# Patient Record
Sex: FEMALE | Race: WHITE | NOT HISPANIC OR LATINO | Employment: FULL TIME | URBAN - METROPOLITAN AREA
[De-identification: names, ages, dates, MRNs, and addresses within clinical notes are randomized per-mention and may not be internally consistent; named-entity substitution may affect disease eponyms.]

---

## 2017-01-01 ENCOUNTER — MYC MEDICAL ADVICE (OUTPATIENT)
Dept: FAMILY MEDICINE | Facility: CLINIC | Age: 47
End: 2017-01-01

## 2017-01-01 DIAGNOSIS — F41.9 ANXIETY: Primary | ICD-10-CM

## 2017-01-02 RX ORDER — LORAZEPAM 0.5 MG/1
0.5 TABLET ORAL EVERY 8 HOURS PRN
Qty: 10 TABLET | Refills: 0 | Status: CANCELLED | OUTPATIENT
Start: 2017-01-02

## 2017-01-04 ENCOUNTER — OFFICE VISIT (OUTPATIENT)
Dept: FAMILY MEDICINE | Facility: CLINIC | Age: 47
End: 2017-01-04
Payer: COMMERCIAL

## 2017-01-04 VITALS
HEART RATE: 80 BPM | BODY MASS INDEX: 31.23 KG/M2 | OXYGEN SATURATION: 100 % | RESPIRATION RATE: 12 BRPM | WEIGHT: 199 LBS | SYSTOLIC BLOOD PRESSURE: 110 MMHG | TEMPERATURE: 97.9 F | DIASTOLIC BLOOD PRESSURE: 74 MMHG | HEIGHT: 67 IN

## 2017-01-04 DIAGNOSIS — I10 HYPERTENSION GOAL BP (BLOOD PRESSURE) < 140/90: ICD-10-CM

## 2017-01-04 DIAGNOSIS — F41.9 ANXIETY: Primary | ICD-10-CM

## 2017-01-04 PROCEDURE — 99213 OFFICE O/P EST LOW 20 MIN: CPT | Performed by: NURSE PRACTITIONER

## 2017-01-04 RX ORDER — FLUOXETINE 10 MG/1
CAPSULE ORAL
Qty: 60 CAPSULE | Refills: 1 | Status: SHIPPED | OUTPATIENT
Start: 2017-01-04 | End: 2017-02-03

## 2017-01-04 RX ORDER — LORAZEPAM 0.5 MG/1
0.5 TABLET ORAL EVERY 8 HOURS PRN
Qty: 10 TABLET | Refills: 0 | Status: SHIPPED | OUTPATIENT
Start: 2017-01-04 | End: 2017-02-03

## 2017-01-04 ASSESSMENT — ANXIETY QUESTIONNAIRES
3. WORRYING TOO MUCH ABOUT DIFFERENT THINGS: NEARLY EVERY DAY
7. FEELING AFRAID AS IF SOMETHING AWFUL MIGHT HAPPEN: MORE THAN HALF THE DAYS
5. BEING SO RESTLESS THAT IT IS HARD TO SIT STILL: SEVERAL DAYS
1. FEELING NERVOUS, ANXIOUS, OR ON EDGE: NEARLY EVERY DAY
IF YOU CHECKED OFF ANY PROBLEMS ON THIS QUESTIONNAIRE, HOW DIFFICULT HAVE THESE PROBLEMS MADE IT FOR YOU TO DO YOUR WORK, TAKE CARE OF THINGS AT HOME, OR GET ALONG WITH OTHER PEOPLE: EXTREMELY DIFFICULT
2. NOT BEING ABLE TO STOP OR CONTROL WORRYING: NEARLY EVERY DAY
6. BECOMING EASILY ANNOYED OR IRRITABLE: MORE THAN HALF THE DAYS
GAD7 TOTAL SCORE: 16

## 2017-01-04 ASSESSMENT — PATIENT HEALTH QUESTIONNAIRE - PHQ9: 5. POOR APPETITE OR OVEREATING: MORE THAN HALF THE DAYS

## 2017-01-04 NOTE — NURSING NOTE
"Chief Complaint   Patient presents with     Anxiety     Blood Draw     hormone check       Initial /74 mmHg  Pulse 80  Temp(Src) 97.9  F (36.6  C) (Oral)  Resp 12  Ht 5' 7\" (1.702 m)  Wt 199 lb (90.266 kg)  BMI 31.16 kg/m2  SpO2 100% Estimated body mass index is 31.16 kg/(m^2) as calculated from the following:    Height as of this encounter: 5' 7\" (1.702 m).    Weight as of this encounter: 199 lb (90.266 kg).  BP completed using cuff size: kim Boykin CMA      "

## 2017-01-04 NOTE — MR AVS SNAPSHOT
"              After Visit Summary   1/4/2017    Lawanda Katz    MRN: [id number]           Patient Information     Date Of Birth          1970        Visit Information        Provider Department      1/4/2017 8:45 AM Haase, Susan Rachele, APRN CNP [facility] [place]        Today's Diagnoses     Hypertension goal BP (blood pressure) < 140/90    -  1     Anxiety            Follow-ups after your visit        Follow-up notes from your care team     Return in about 6 months (around 7/4/2017).      Who to contact     If you have questions or need follow up information about today's clinic visit or your schedule please contact [facility] directly at [phone].  Normal or non-critical lab and imaging results will be communicated to you by [employer], letter or phone within 4 business days after the clinic has received the results. If you do not hear from us within 7 days, please contact the clinic through [employer] or phone. If you have a critical or abnormal lab result, we will notify you by phone as soon as possible.  Submit refill requests through [employer] or call your pharmacy and they will forward the refill request to us. Please allow 3 business days for your refill to be completed.          Additional Information About Your Visit        MyChart Information     [employer] gives you secure access to your electronic health record. If you see a primary care provider, you can also send messages to your care team and make appointments. If you have questions, please call your primary care clinic.  If you do not have a primary care provider, please call [phone] and they will assist you.        Care EveryWhere ID     This is your Care EveryWhere ID. This could be used by other organizations to access your [place] medical records  [id number]        Your Vitals Were     Pulse Temperature Respirations Height BMI (Body Mass Index) Pulse Oximetry    80 97.9  F (36.6  C) (Oral) 12 5' 7\" " (1.702 m) 31.16 kg/m2 100%       Blood Pressure from Last 3 Encounters:   01/04/17 110/74   11/17/16 120/80   10/05/16 142/84    Weight from Last 3 Encounters:   01/04/17 199 lb (90.266 kg)   10/05/16 195 lb (88.451 kg)   07/15/16 197 lb (89.359 kg)              Today, you had the following     No orders found for display         Today's Medication Changes          These changes are accurate as of: 1/4/17  9:31 AM.  If you have any questions, ask your nurse or doctor.               Start taking these medicines.        Dose/Directions    FLUoxetine 10 MG capsule   Commonly known as:  PROzac   Used for:  Anxiety   Started by:  Haase, Susan Rachele, APRN CNP        Take 10 mg for 7 days, after 1 week increase to 20 mg.   Quantity:  60 capsule   Refills:  1            Where to get your medicines      These medications were sent to [address] [address]     Phone:  [phone]    - FLUoxetine 10 MG capsule      Some of these will need a paper prescription and others can be bought over the counter.  Ask your nurse if you have questions.     Bring a paper prescription for each of these medications    - LORazepam 0.5 MG tablet             Primary Care Provider Office Phone # Fax #    Susan Rachele Haase, APRN [phone]       [address] [address]        Thank you!     Thank you for choosing [facility]  for your care. Our goal is always to provide you with excellent care. Hearing back from our patients is one way we can continue to improve our services. Please take a few minutes to complete the written survey that you may receive in the mail after your visit with us. Thank you!             Your Updated Medication List - Protect others around you: Learn how to safely use, store and throw away your medicines at www.disposemymeds.org.          This list is  accurate as of: 1/4/17  9:31 AM.  Always use your most recent med list.                   Brand Name Dispense Instructions for use    amLODIPine-benazepril 10-40 MG per capsule    LOTREL    90 capsule    Take 1 capsule by mouth daily       FLUoxetine 10 MG capsule    PROzac    60 capsule    Take 10 mg for 7 days, after 1 week increase to 20 mg.       LORazepam 0.5 MG tablet    ATIVAN    10 tablet    Take 1 tablet (0.5 mg) by mouth every 8 hours as needed for anxiety       tacrolimus 0.1 % ointment    PROTOPIC    60 g    Apply topically 2 times daily       valACYclovir 1000 mg tablet    VALTREX    90 tablet    Take 1 tablet (1,000 mg) by mouth daily

## 2017-01-04 NOTE — PROGRESS NOTES
SUBJECTIVE:                                                    Lawanda Katz is a 46 year old female who presents to clinic today for the following health issues:    Abnormal Mood Symptoms    Duration: 1 month    Description:  Depression: YES  Anxiety: YES  Panic attacks: YES     Accompanying signs and symptoms: see PHQ-9 and PARKER scores    History (similar episodes/previous evaluation): yes    Precipitating or alleviating factors: None    Therapies tried and outcome: Ativan (Lorezepam)   Fainancial stress due to unexpected exepnses.    Feeling really down.    Gambling:  Is under control, last gambeled 3 weeks ago, no concerns under good control.   PHQ 9 socre of 14, PARKER 7 score of 16. Denies thoughts of harming self or others.    Has been on citalopram in the past without relief of symptoms, also allergy to wellbutrin  Insomnia:  Difficulty going to sleep and staying asleep.      Problem list and histories reviewed & adjusted, as indicated.  Additional history: as documented    Patient Active Problem List   Diagnosis     Hypertension goal BP (blood pressure) < 140/90     Recurrent cold sores     S/P hysterectomy     CARDIOVASCULAR SCREENING; LDL GOAL LESS THAN 160     Anxiety     Vitamin D deficiency     Past Surgical History   Procedure Laterality Date     Hysterectomy, pap no longer indicated  2010     BSO, cervix removed, for dysmenorrhea     Adenoidectomy       Cystoscopy  2007     Tubal ligation  1999      breast biopsy lt  1/2013     left, benign       Social History   Substance Use Topics     Smoking status: Never Smoker      Smokeless tobacco: Never Used     Alcohol Use: No     Family History   Problem Relation Age of Onset     HEART DISEASE Mother      Unknown/Adopted No family hx of          Current Outpatient Prescriptions   Medication Sig Dispense Refill     tacrolimus (PROTOPIC) 0.1 % ointment Apply topically 2 times daily 60 g 0     LORazepam (ATIVAN) 0.5 MG tablet Take 1 tablet (0.5 mg) by  "mouth every 8 hours as needed for anxiety 10 tablet 0     amLODIPine-benazepril (LOTREL) 10-40 MG per capsule Take 1 capsule by mouth daily 90 capsule 3     valACYclovir (VALTREX) 1000 mg tablet Take 1 tablet (1,000 mg) by mouth daily 90 tablet 1     Allergies   Allergen Reactions     Hydrochlorothiazide Nausea and Diarrhea     Metronidazole      Wellbutrin [Bupropion]      Tingling sensation       Morphine Rash     ROS:  C: NEGATIVE for fever, chills, change in weight  R: NEGATIVE for significant cough or SOB  CV: NEGATIVE for chest pain, palpitations or peripheral edema  PSYCHIATRIC: see HPI    OBJECTIVE:                                                    /74 mmHg  Pulse 80  Temp(Src) 97.9  F (36.6  C) (Oral)  Resp 12  Ht 5' 7\" (1.702 m)  Wt 199 lb (90.266 kg)  BMI 31.16 kg/m2  SpO2 100%  Body mass index is 31.16 kg/(m^2).   GENERAL: healthy, alert and no distress  RESP: lungs clear to auscultation - no rales, rhonchi or wheezes  CV: regular rate and rhythm, normal S1 S2, no S3 or S4, no murmur, click or rub, no peripheral edema and peripheral pulses strong  PSYCH: mentation appears normal, tearful when discussing depression       ASSESSMENT:                                                    See below     PLAN:                                                    Lawanda was seen today for anxiety and blood draw.    Diagnoses and all orders for this visit:    Anxiety  -     FLUoxetine (PROZAC) 10 MG capsule; Take 10 mg for 7 days, after 1 week increase to 20 mg.  -     LORazepam (ATIVAN) 0.5 MG tablet; Take 1 tablet (0.5 mg) by mouth every 8 hours as needed for anxiety  Discussed risks and benefits of medication, need to take on a daily basis, will take at least 2-4 weeks to notice decrease in depression or anxiety, if symptoms of depression worsen stop taking medication and notify the clinic.  Is aware of emergency resources.  Encouraged increased in exercise and counseling.     Hypertension goal BP " (blood pressure) < 140/90:  Well controlled /74,         FUTURE APPOINTMENTS:       - Follow-up visit in 4-6 weeks    Susan Haase, APRN [facility]

## 2017-01-05 ASSESSMENT — ANXIETY QUESTIONNAIRES: GAD7 TOTAL SCORE: 16

## 2017-01-05 ASSESSMENT — PATIENT HEALTH QUESTIONNAIRE - PHQ9: SUM OF ALL RESPONSES TO PHQ QUESTIONS 1-9: 14

## 2017-01-19 ENCOUNTER — MYC MEDICAL ADVICE (OUTPATIENT)
Dept: FAMILY MEDICINE | Facility: CLINIC | Age: 47
End: 2017-01-19

## 2017-01-22 ENCOUNTER — OFFICE VISIT (OUTPATIENT)
Dept: URGENT CARE | Facility: URGENT CARE | Age: 47
End: 2017-01-22
Payer: COMMERCIAL

## 2017-01-22 ENCOUNTER — RADIANT APPOINTMENT (OUTPATIENT)
Dept: GENERAL RADIOLOGY | Facility: CLINIC | Age: 47
End: 2017-01-22
Attending: FAMILY MEDICINE
Payer: COMMERCIAL

## 2017-01-22 VITALS
WEIGHT: 199 LBS | TEMPERATURE: 98.2 F | HEIGHT: 67 IN | HEART RATE: 66 BPM | BODY MASS INDEX: 31.23 KG/M2 | SYSTOLIC BLOOD PRESSURE: 118 MMHG | OXYGEN SATURATION: 99 % | DIASTOLIC BLOOD PRESSURE: 84 MMHG | RESPIRATION RATE: 16 BRPM

## 2017-01-22 DIAGNOSIS — R10.9 STOMACH PAIN: ICD-10-CM

## 2017-01-22 DIAGNOSIS — R10.9 STOMACH PAIN: Primary | ICD-10-CM

## 2017-01-22 LAB
ALBUMIN UR-MCNC: NEGATIVE MG/DL
APPEARANCE UR: CLEAR
BASOPHILS # BLD AUTO: 0 10E9/L (ref 0–0.2)
BASOPHILS NFR BLD AUTO: 0.3 %
BILIRUB UR QL STRIP: NEGATIVE
COLOR UR AUTO: YELLOW
DIFFERENTIAL METHOD BLD: ABNORMAL
EOSINOPHIL # BLD AUTO: 0.1 10E9/L (ref 0–0.7)
EOSINOPHIL NFR BLD AUTO: 1.2 %
ERYTHROCYTE [DISTWIDTH] IN BLOOD BY AUTOMATED COUNT: 13.4 % (ref 10–15)
GLUCOSE UR STRIP-MCNC: NEGATIVE MG/DL
HCT VFR BLD AUTO: 40.4 % (ref 35–47)
HGB BLD-MCNC: 14.1 G/DL (ref 11.7–15.7)
HGB UR QL STRIP: NEGATIVE
KETONES UR STRIP-MCNC: NEGATIVE MG/DL
LEUKOCYTE ESTERASE UR QL STRIP: NEGATIVE
LYMPHOCYTES # BLD AUTO: 2.1 10E9/L (ref 0.8–5.3)
LYMPHOCYTES NFR BLD AUTO: 35.3 %
MCH RBC QN AUTO: 26.9 PG (ref 26.5–33)
MCHC RBC AUTO-ENTMCNC: 34.9 G/DL (ref 31.5–36.5)
MCV RBC AUTO: 77 FL (ref 78–100)
MONOCYTES # BLD AUTO: 0.5 10E9/L (ref 0–1.3)
MONOCYTES NFR BLD AUTO: 7.6 %
NEUTROPHILS # BLD AUTO: 3.4 10E9/L (ref 1.6–8.3)
NEUTROPHILS NFR BLD AUTO: 55.6 %
NITRATE UR QL: NEGATIVE
PH UR STRIP: 5.5 PH (ref 5–7)
PLATELET # BLD AUTO: 216 10E9/L (ref 150–450)
RBC # BLD AUTO: 5.25 10E12/L (ref 3.8–5.2)
SP GR UR STRIP: >1.03 (ref 1–1.03)
URN SPEC COLLECT METH UR: NORMAL
UROBILINOGEN UR STRIP-ACNC: 0.2 EU/DL (ref 0.2–1)
WBC # BLD AUTO: 6 10E9/L (ref 4–11)

## 2017-01-22 PROCEDURE — 99214 OFFICE O/P EST MOD 30 MIN: CPT | Performed by: FAMILY MEDICINE

## 2017-01-22 PROCEDURE — 85025 COMPLETE CBC W/AUTO DIFF WBC: CPT | Performed by: FAMILY MEDICINE

## 2017-01-22 PROCEDURE — 74020 XR ABDOMEN 2 VW: CPT

## 2017-01-22 PROCEDURE — 36415 COLL VENOUS BLD VENIPUNCTURE: CPT | Performed by: FAMILY MEDICINE

## 2017-01-22 PROCEDURE — 81003 URINALYSIS AUTO W/O SCOPE: CPT | Performed by: FAMILY MEDICINE

## 2017-01-22 NOTE — PROGRESS NOTES
"SUBJECTIVE:  Lawanda Katz, a 47 year old female scheduled an appointment to discuss the following issues:  Stomach pain; bores in stomach. Food unsure . , some nausea. Diarrhea neg.         Medical, social, surgical, and family histories reviewed.    ROS:  C: NEGATIVE for fever, chills  E: NEGATIVE for vision changes   R: NEGATIVE for significant cough or SOB  CV: NEGATIVE for chest pain, palpitations   GI: off-and-on abdominal pain for several weeks probably worsen last 2 weeks.Not associated with nausea vomiting or diarrhea no blood in her stool.. Feels like a band across her stomach at times. Food does not seem to affect it. She is concerned about gallbladder disease  : NEGATIVE for frequency, dysuria, or hematuria  M: NEGATIVE for significant arthralgias or myalgia  N: NEGATIVE for weakness, dizziness or paresthesias or headache    OBJECTIVE:  /84 mmHg  Pulse 66  Temp(Src) 98.2  F (36.8  C) (Oral)  Resp 16  Ht 5' 7\" (1.702 m)  Wt 199 lb (90.266 kg)  BMI 31.16 kg/m2  SpO2 99%  EXAM:  GENERAL APPEARANCE: healthy, alert and no distress  EYES: EOMI,  PERRL  HENT: ear canals and TM's normal and nose and mouth without ulcers or lesions  RESP: lungs clear to auscultation - no rales, rhonchi or wheezes  CV: regular rates and rhythm, normal S1 S2, no S3 or S4 and no murmur, click or rub -  ABDOMEN: soft and tender in the epigastrium. This is an otherwise negative exam    ASSESSMENT/PLAN:  (R10.9) Stomach pain  (primary encounter diagnosis)  Comment:   Plan:     abdominal pain over the last several weeks epigastric in nature. ddifferential diagnoses does include gallbladder disease pancreatitis epigastric pain that might suggest ulcer. i doubt if there is any lower colon problems she has none by symptoms    I'm awaiting the labs    In the interim I did have her start high-dose Pepcid as well as high-doseomeprazole.    Pending these results she may need a CT scan of her abdomen looking at the right upper " quadrant or an ultrasound

## 2017-01-22 NOTE — NURSING NOTE
"Chief Complaint   Patient presents with     Abdominal Pain       Initial /84 mmHg  Pulse 66  Temp(Src) 98.2  F (36.8  C) (Oral)  Resp 16  Ht 5' 7\" (1.702 m)  Wt 199 lb (90.266 kg)  BMI 31.16 kg/m2  SpO2 99% Estimated body mass index is 31.16 kg/(m^2) as calculated from the following:    Height as of this encounter: 5' 7\" (1.702 m).    Weight as of this encounter: 199 lb (90.266 kg).  BP completed using cuff size: regular      Chana Lemus  CMA      "

## 2017-01-23 DIAGNOSIS — R10.9 STOMACH PAIN: ICD-10-CM

## 2017-01-23 LAB — LIPASE SERPL-CCNC: 191 U/L (ref 73–393)

## 2017-01-23 PROCEDURE — 80076 HEPATIC FUNCTION PANEL: CPT | Performed by: FAMILY MEDICINE

## 2017-01-23 PROCEDURE — 36415 COLL VENOUS BLD VENIPUNCTURE: CPT | Performed by: FAMILY MEDICINE

## 2017-01-23 PROCEDURE — 83690 ASSAY OF LIPASE: CPT | Performed by: FAMILY MEDICINE

## 2017-01-24 LAB
ALBUMIN SERPL-MCNC: 3.8 G/DL (ref 3.4–5)
ALP SERPL-CCNC: 74 U/L (ref 40–150)
ALT SERPL W P-5'-P-CCNC: 21 U/L (ref 0–50)
AST SERPL W P-5'-P-CCNC: 14 U/L (ref 0–45)
BILIRUB DIRECT SERPL-MCNC: 0.1 MG/DL (ref 0–0.2)
BILIRUB SERPL-MCNC: 0.4 MG/DL (ref 0.2–1.3)
PROT SERPL-MCNC: 7.6 G/DL (ref 6.8–8.8)

## 2017-02-03 ENCOUNTER — OFFICE VISIT (OUTPATIENT)
Dept: FAMILY MEDICINE | Facility: CLINIC | Age: 47
End: 2017-02-03
Payer: COMMERCIAL

## 2017-02-03 VITALS
DIASTOLIC BLOOD PRESSURE: 80 MMHG | SYSTOLIC BLOOD PRESSURE: 120 MMHG | HEART RATE: 80 BPM | TEMPERATURE: 97.8 F | WEIGHT: 198 LBS | RESPIRATION RATE: 12 BRPM | BODY MASS INDEX: 31 KG/M2

## 2017-02-03 DIAGNOSIS — F41.9 ANXIETY: Primary | ICD-10-CM

## 2017-02-03 DIAGNOSIS — Z02.9 ADMINISTRATIVE ENCOUNTER: ICD-10-CM

## 2017-02-03 PROCEDURE — 99213 OFFICE O/P EST LOW 20 MIN: CPT | Performed by: NURSE PRACTITIONER

## 2017-02-03 RX ORDER — LORAZEPAM 0.5 MG/1
0.5 TABLET ORAL EVERY 8 HOURS PRN
Qty: 10 TABLET | Refills: 0 | Status: SHIPPED | OUTPATIENT
Start: 2017-02-03 | End: 2017-04-05

## 2017-02-03 ASSESSMENT — ANXIETY QUESTIONNAIRES
IF YOU CHECKED OFF ANY PROBLEMS ON THIS QUESTIONNAIRE, HOW DIFFICULT HAVE THESE PROBLEMS MADE IT FOR YOU TO DO YOUR WORK, TAKE CARE OF THINGS AT HOME, OR GET ALONG WITH OTHER PEOPLE: SOMEWHAT DIFFICULT
GAD7 TOTAL SCORE: 16
7. FEELING AFRAID AS IF SOMETHING AWFUL MIGHT HAPPEN: NEARLY EVERY DAY
3. WORRYING TOO MUCH ABOUT DIFFERENT THINGS: NEARLY EVERY DAY
7. FEELING AFRAID AS IF SOMETHING AWFUL MIGHT HAPPEN: NEARLY EVERY DAY
6. BECOMING EASILY ANNOYED OR IRRITABLE: MORE THAN HALF THE DAYS
5. BEING SO RESTLESS THAT IT IS HARD TO SIT STILL: SEVERAL DAYS
1. FEELING NERVOUS, ANXIOUS, OR ON EDGE: MORE THAN HALF THE DAYS
2. NOT BEING ABLE TO STOP OR CONTROL WORRYING: NEARLY EVERY DAY

## 2017-02-03 ASSESSMENT — PATIENT HEALTH QUESTIONNAIRE - PHQ9: 5. POOR APPETITE OR OVEREATING: MORE THAN HALF THE DAYS

## 2017-02-03 NOTE — NURSING NOTE
"Chief Complaint   Patient presents with     Depression       Initial /80 mmHg  Pulse 80  Temp(Src) 97.8  F (36.6  C) (Oral)  Resp 12  Wt 198 lb (89.812 kg) Estimated body mass index is 31 kg/(m^2) as calculated from the following:    Height as of 1/22/17: 5' 7\" (1.702 m).    Weight as of this encounter: 198 lb (89.812 kg).  BP completed using cuff size: kim Boykin CMA      "

## 2017-02-03 NOTE — MR AVS SNAPSHOT
After Visit Summary   2/3/2017    Lawanda Katz    MRN: [id number]           Patient Information     Date Of Birth          1970        Visit Information        Provider Department      2/3/2017 3:15 PM Haase, Susan Rachele, APRN CNP [facility]        Today's Diagnoses     Anxiety    -  1        Follow-ups after your visit        Follow-up notes from your care team     Return in about 4 weeks (around 3/3/2017).      Who to contact     If you have questions or need follow up information about today's clinic visit or your schedule please contact [facility] directly at [phone].  Normal or non-critical lab and imaging results will be communicated to you by MyChart, letter or phone within 4 business days after the clinic has received the results. If you do not hear from us within 7 days, please contact the clinic through [employer] or phone. If you have a critical or abnormal lab result, we will notify you by phone as soon as possible.  Submit refill requests through [email or web address] or call your pharmacy and they will forward the refill request to us. Please allow 3 business days for your refill to be completed.          Additional Information About Your Visit        MyChart Information     [email or web address] gives you secure access to your electronic health record. If you see a primary care provider, you can also send messages to your care team and make appointments. If you have questions, please call your primary care clinic.  If you do not have a primary care provider, please call [phone] and they will assist you.        Care EveryWhere ID     This is your Care EveryWhere ID. This could be used by other organizations to access your [place] medical records  [id number]        Your Vitals Were     Pulse Temperature Respirations             80 97.8  F (36.6  C) (Oral) 12          Blood Pressure from Last 3 Encounters:   02/03/17 120/80   01/22/17 118/84   01/04/17 110/74     Weight from Last 3 Encounters:   02/03/17 198 lb (89.812 kg)   01/22/17 199 lb (90.266 kg)   01/04/17 199 lb (90.266 kg)              Today, you had the following     No orders found for display         Today's Medication Changes          These changes are accurate as of: 2/3/17  3:44 PM.  If you have any questions, ask your nurse or doctor.               These medicines have changed or have updated prescriptions.        Dose/Directions    FLUoxetine 20 MG capsule   Commonly known as:  PROzac   This may have changed:  medication strength   Used for:  Anxiety   Changed by:  Haase, Susan Rachele, APRN CNP        Take 10 mg for 7 days, after 1 week increase to 20 mg.   Quantity:  30 capsule   Refills:  11            Where to get your medicines      These medications were sent to [facility] [address] [address]     Phone:  [phone]    - FLUoxetine 20 MG capsule      Some of these will need a paper prescription and others can be bought over the counter.  Ask your nurse if you have questions.     Bring a paper prescription for each of these medications    - LORazepam 0.5 MG tablet             Primary Care Provider Office Phone # Fax #    Susan Rachele Haase, APRN [phone]       [address] [address]        Thank you!     Thank you for choosing [facility]  for your care. Our goal is always to provide you with excellent care. Hearing back from our patients is one way we can continue to improve our services. Please take a few minutes to complete the written survey that you may receive in the mail after your visit with us. Thank you!             Your Updated Medication List - Protect others around you: Learn how to safely use, store and throw away your medicines at www.disposemymeds.org.          This list is accurate as of: 2/3/17  3:44 PM.  Always use your  most recent med list.                   Brand Name Dispense Instructions for use    amLODIPine-benazepril 10-40 MG per capsule    LOTREL    90 capsule    Take 1 capsule by mouth daily       FLUoxetine 20 MG capsule    PROzac    30 capsule    Take 10 mg for 7 days, after 1 week increase to 20 mg.       LORazepam 0.5 MG tablet    ATIVAN    10 tablet    Take 1 tablet (0.5 mg) by mouth every 8 hours as needed for anxiety       tacrolimus 0.1 % ointment    PROTOPIC    60 g    Apply topically 2 times daily       valACYclovir 1000 mg tablet    VALTREX    90 tablet    Take 1 tablet (1,000 mg) by mouth daily

## 2017-02-03 NOTE — PROGRESS NOTES
SUBJECTIVE:                                                    Lawanda Katz is a 47 year old female who presents to clinic today for the following health issues:      Depression and Anxiety Follow-Up    Status since last visit: No change    Other associated symptoms:None    Complicating factors:     Significant life event: YES    Current substance abuse: None    PHQ-9 SCORE 6/2/2014 8/15/2014 1/4/2017   Total Score 1 4 -   Total Score - - 14     PARKER-7 SCORE 6/2/2014 8/15/2014 1/4/2017   Total Score 8 15 -   Total Score - - 16        PHQ-9  English      PHQ-9   Any Language     GAD7     Amount of exercise or physical activity: None    Problems taking medications regularly: No    Medication side effects: none    Diet: regular (no restrictions)  At last visit on 1/4 was started on fluoxetine, is now taking 20 mg every day, has slight decrease in depression.  PHQ 9 score of 14; PARKER 7 score of 12;  Anxiety level continues to be elevated, relates this to issues that she is having with her .  Has other stress of brother in-law living with them.    FMLA forms:  Would like these to be completed for periodic absences due to anxiety.      Problem list and histories reviewed & adjusted, as indicated.  Additional history: as documented    Patient Active Problem List   Diagnosis     Hypertension goal BP (blood pressure) < 140/90     Recurrent cold sores     S/P hysterectomy     CARDIOVASCULAR SCREENING; LDL GOAL LESS THAN 160     Anxiety     Vitamin D deficiency     Past Surgical History   Procedure Laterality Date     Hysterectomy, pap no longer indicated  2010     BSO, cervix removed, for dysmenorrhea     Adenoidectomy       Cystoscopy  2007     Tubal ligation  1999     [facility] breast biopsy lt  1/2013     left, benign       Social History   Substance Use Topics     Smoking status: Never Smoker      Smokeless tobacco: Never Used     Alcohol Use: No     Family History   Problem Relation Age of Onset     HEART DISEASE  Mother      Unknown/Adopted No family hx of          Current Outpatient Prescriptions   Medication Sig Dispense Refill     LORazepam (ATIVAN) 0.5 MG tablet Take 1 tablet (0.5 mg) by mouth every 8 hours as needed for anxiety 10 tablet 0     FLUoxetine (PROZAC) 10 MG capsule Take 10 mg for 7 days, after 1 week increase to 20 mg. 60 capsule 1     tacrolimus (PROTOPIC) 0.1 % ointment Apply topically 2 times daily 60 g 0     amLODIPine-benazepril (LOTREL) 10-40 MG per capsule Take 1 capsule by mouth daily 90 capsule 3     valACYclovir (VALTREX) 1000 mg tablet Take 1 tablet (1,000 mg) by mouth daily 90 tablet 1     Allergies   Allergen Reactions     Hydrochlorothiazide Nausea and Diarrhea     Metronidazole      Wellbutrin [Bupropion]      Tingling sensation       Morphine Rash       ROS:  C: NEGATIVE for fever, chills, change in weight  E/M: NEGATIVE for ear, mouth and throat problems  R: NEGATIVE for significant cough or SOB  CV: NEGATIVE for chest pain, palpitations or peripheral edema  PSYCHIATRIC: see HPI    OBJECTIVE:                                                    /80 mmHg  Pulse 80  Temp(Src) 97.8  F (36.6  C) (Oral)  Resp 12  Wt 198 lb (89.812 kg)  Body mass index is 31 kg/(m^2).   GENERAL: healthy, alert and no distress  PSYCH: mentation appears normal, affect tearful      ASSESSMENT:                                                    See below     PLAN:                                                    Lawanda was seen today for depression.    Diagnoses and all orders for this visit:    Anxiety:  Strongly encouraged counseling.  -       FLUoxetine (PROZAC) 20 MG capsule; Take 1 capsule (20 mg) by mouth daily    Administrative encounter: FMLA forms received.        FUTURE APPOINTMENTS:       - Follow-up visit in 4 weeks, sooner as needed.    Susan Haase, APRN [facility]

## 2017-02-03 NOTE — TELEPHONE ENCOUNTER
Ativan 0.5mg      Last Written Prescription Date:  1/4/17  Last Fill Quantity: 10,   # refills: 0  Last Office Visit with [facility] or [facility] prescribing provider: 1/4/17  Future Office visit:       Routing refill request to provider for review/approval because:  Drug not on the [facility], [facility] or [facility] refill protocol or controlled substance    Иван Pierce CPhT  [facility]    On behalf of [facility]

## 2017-02-06 ASSESSMENT — ANXIETY QUESTIONNAIRES
2. NOT BEING ABLE TO STOP OR CONTROL WORRYING: NEARLY EVERY DAY
IF YOU CHECKED OFF ANY PROBLEMS ON THIS QUESTIONNAIRE, HOW DIFFICULT HAVE THESE PROBLEMS MADE IT FOR YOU TO DO YOUR WORK, TAKE CARE OF THINGS AT HOME, OR GET ALONG WITH OTHER PEOPLE: VERY DIFFICULT
GAD7 TOTAL SCORE: 12
5. BEING SO RESTLESS THAT IT IS HARD TO SIT STILL: NOT AT ALL
3. WORRYING TOO MUCH ABOUT DIFFERENT THINGS: MORE THAN HALF THE DAYS
7. FEELING AFRAID AS IF SOMETHING AWFUL MIGHT HAPPEN: MORE THAN HALF THE DAYS
1. FEELING NERVOUS, ANXIOUS, OR ON EDGE: NEARLY EVERY DAY
6. BECOMING EASILY ANNOYED OR IRRITABLE: SEVERAL DAYS

## 2017-02-06 ASSESSMENT — PATIENT HEALTH QUESTIONNAIRE - PHQ9: 5. POOR APPETITE OR OVEREATING: SEVERAL DAYS

## 2017-02-07 ASSESSMENT — ANXIETY QUESTIONNAIRES: GAD7 TOTAL SCORE: 12

## 2017-02-07 ASSESSMENT — PATIENT HEALTH QUESTIONNAIRE - PHQ9: SUM OF ALL RESPONSES TO PHQ QUESTIONS 1-9: 13

## 2017-02-27 ENCOUNTER — OFFICE VISIT (OUTPATIENT)
Dept: FAMILY MEDICINE | Facility: CLINIC | Age: 47
End: 2017-02-27
Payer: COMMERCIAL

## 2017-02-27 VITALS
DIASTOLIC BLOOD PRESSURE: 82 MMHG | WEIGHT: 196 LBS | BODY MASS INDEX: 30.7 KG/M2 | HEART RATE: 78 BPM | TEMPERATURE: 98.7 F | RESPIRATION RATE: 14 BRPM | SYSTOLIC BLOOD PRESSURE: 124 MMHG

## 2017-02-27 DIAGNOSIS — J11.1 INFLUENZA: Primary | ICD-10-CM

## 2017-02-27 LAB
FLUAV+FLUBV AG SPEC QL: ABNORMAL
FLUAV+FLUBV AG SPEC QL: POSITIVE
SPECIMEN SOURCE: ABNORMAL

## 2017-02-27 PROCEDURE — 99213 OFFICE O/P EST LOW 20 MIN: CPT | Performed by: PHYSICIAN ASSISTANT

## 2017-02-27 PROCEDURE — 87804 INFLUENZA ASSAY W/OPTIC: CPT | Performed by: PHYSICIAN ASSISTANT

## 2017-02-27 RX ORDER — BENZONATATE 100 MG/1
100 CAPSULE ORAL 3 TIMES DAILY PRN
Qty: 42 CAPSULE | Refills: 0 | Status: SHIPPED | OUTPATIENT
Start: 2017-02-27 | End: 2017-04-05

## 2017-02-27 RX ORDER — OSELTAMIVIR PHOSPHATE 75 MG/1
75 CAPSULE ORAL 2 TIMES DAILY
Qty: 10 CAPSULE | Refills: 0 | Status: SHIPPED | OUTPATIENT
Start: 2017-02-27 | End: 2017-04-05

## 2017-02-27 RX ORDER — CODEINE PHOSPHATE AND GUAIFENESIN 10; 100 MG/5ML; MG/5ML
1 SOLUTION ORAL EVERY 4 HOURS PRN
Qty: 120 ML | Refills: 0 | Status: SHIPPED | OUTPATIENT
Start: 2017-02-27 | End: 2017-04-05

## 2017-02-27 RX ORDER — ALBUTEROL SULFATE 90 UG/1
2 AEROSOL, METERED RESPIRATORY (INHALATION) EVERY 6 HOURS PRN
Qty: 1 INHALER | Refills: 0 | Status: SHIPPED | OUTPATIENT
Start: 2017-02-27 | End: 2017-04-05

## 2017-02-27 NOTE — PROGRESS NOTES
SUBJECTIVE:                                                    Lawanda Katz is a 47 year old female who presents to clinic today for the following health issues:      Acute Illness   Acute illness concerns:   Onset: Friday    Fever: 99    Chills/Sweats: YES    Headache (location?): YES    Sinus Pressure:no    Conjunctivitis:  rash on side of eyes last pm    Ear Pain: no    Rhinorrhea: YES    Congestion: YES    Sore Throat: YES- from cough     Cough: YES-non-productive    Wheeze: no     Decreased Appetite: no     Nausea: no    Vomiting: no    Diarrhea:  no    Dysuria/Freq.: no    Fatigue/Achiness: YES    Sick/Strep Exposure: no     Therapies Tried and outcome: theraflu cold and sinus, tylenol      Problem list and histories reviewed & adjusted, as indicated.  Additional history: as documented    Problem list, Medication list, Allergies, and Medical/Social/Surgical histories reviewed in EPIC and updated as appropriate.    ROS:  Constitutional, HEENT, cardiovascular, pulmonary, gi and gu systems are negative, except as otherwise noted.    OBJECTIVE:                                                    /82 (BP Location: Right arm, Patient Position: Chair, Cuff Size: Adult Large)  Pulse 78  Temp 98.7  F (37.1  C) (Oral)  Resp 14  Wt 196 lb (88.9 kg)  BMI 30.7 kg/m2  Body mass index is 30.7 kg/(m^2).  GENERAL: alert, mild distress, fatigued and ill  EYES: Eyes grossly normal to inspection, PERRL and conjunctivae and sclerae normal  HENT: normal cephalic/atraumatic, both ears: clear effusion, nasal mucosa edematous , oropharynx clear, oral mucous membranes moist and sinuses: not tender  NECK: no adenopathy, no asymmetry, masses, or scars and thyroid normal to palpation  RESP: lungs clear to auscultation - no rales, rhonchi or wheezes  CV: regular rates and rhythm, normal S1 S2, no S3 or S4 and no murmur, click or rub  PSYCH: mentation appears normal, affect normal/bright    Diagnostic Test Results:  Results for  orders placed or performed in visit on 02/27/17 (from the past 24 hour(s))   Influenza A/B antigen   Result Value Ref Range    Influenza A/B Agn Specimen Nasal     Influenza A Positive (A) NEG    Influenza B  NEG     Negative   Test results must be correlated with clinical data. If necessary, results   should be confirmed by a molecular assay or viral culture.          ASSESSMENT/PLAN:                                                      (J11.1) Influenza  (primary encounter diagnosis)  Comment: evident for A. Given shorter course, tamiflu may still aid in recovery, but given lack of risk factors for complications, may not be totally necessary. Patient to check on cost. However, further support of cough suppression and inhaler will be given with continued otc supportive care measures. If symptoms fail to improve in 1 week, patient should RTC. Sooner if worsening.   Plan: Influenza A/B antigen, oseltamivir (TAMIFLU) 75        MG capsule, guaiFENesin-codeine (ROBITUSSIN AC)        100-10 MG/5ML SOLN solution, benzonatate         (TESSALON) 100 MG capsule, albuterol (PROAIR         HFA/PROVENTIL HFA/VENTOLIN HFA) 108 (90 BASE)         MCG/ACT Inhaler        -Medication use and side effects discussed with the patient. Patient is in complete understanding and agreement with plan.         Follow up: as above     Juan Diego Coronado PA-C  [facility]

## 2017-02-27 NOTE — LETTER
[facility]  [address] [place] [id number]  Phone: [phone]    February 27, 2017        Lawanda Katz  [address]  [place] [address]          To whom it may concern:    RE: Lawanda Katz    Patient was seen and treated today at our clinic and missed work due to illness. Recommending rest and recovery as well as avoidance of work space due to being contagious until symptoms improve. May take as long as 1 additional week.     Please contact me for questions or concerns.      Sincerely,        Juan Diego Coronado PA-C

## 2017-02-27 NOTE — MR AVS SNAPSHOT
After Visit Summary   2/27/2017    Lawanda Katz    MRN: [id number]           Patient Information     Date Of Birth          1970        Visit Information        Provider Department      2/27/2017 1:15 PM Juan Diego Coronado PA-C [facility]        Today's Diagnoses     Influenza    -  1      Care Instructions      Influenza  Influenza ( the flu ) is an infection that affects your respiratory tract (the mouth, nose, and lungs, and the passages between them). Unlike a cold, the flu can make you very ill. And it can lead to pneumonia, a serious lung infection. For some people, especially older adults, young children, and people with certain chronic conditions, the flu can have serious complications and even be fatal.  What Are the Risk Factors for the Flu?     Viruses that cause influenza spread through the air in droplets when someone who has the flu coughs, sneezes, laughs, or talks.   Anyone can get the flu. But you re more likely to become infected if you:    Have a weakened immune system.    Work in a health care setting where you may be exposed to flu germs.    Live or work with someone who has the flu.    Haven t received an annual flu shot.  How Does the Flu Spread?  The flu is caused by viruses. The viruses spread through the air in droplets when someone who has the flu coughs, sneezes, laughs, or talks. You can become infected when you inhale these viruses directly. You can also become infected when you touch a surface on which the droplets have landed and then transfer the germs to your eyes, nose, or mouth. Touching used tissues, or sharing utensils, drinking glasses, or a toothbrush with an infected person can expose you to flu viruses, too.  What Are the Symptoms of the Flu?  Flu symptoms tend to come on quickly and may last a few days to a few weeks. They include:    Fever usually higher than 101 F  (38.3 C) and chills    Sore throat and headache    Dry  cough    Runny nose    Tiredness and weakness    Muscle aches  Factors That Can Make Flu Worse  For some people, the flu can be very serious. The risk of complications is greater for:    Children under age 5.    Adults 65 years of age and older.    People with a chronic illness, such as diabetes or heart, kidney, or lung disease.    People who live in a nursing home or long-term care facility.   How Is the Flu Treated?  Influenza usually improves after 7 days or so. In some cases, your health care provider may prescribe an antiviral medication. This may help you get well sooner. For the medication to help, you need to take it as soon as possible (ideally within 48 hours) after your symptoms start. If you develop pneumonia or other serious illness, hospital care may be needed.  Easing Flu Symptoms    Drink lots of fluids such as water, juice, and warm soup. A good rule is to drink enough so that you urinate your normal amount.    Get plenty of rest.    Ask your health care provider what to take for fever and pain.    Call your provider if your fever rises over 101 F (38.3 C) or you become dizzy, lightheaded, or short of breath.  Taking Steps to Protect Others    Wash your hands often, especially after coughing or sneezing. Or, clean your hands with an alcohol-based hand  containing at least 60 percent alcohol.    Cough or sneeze into a tissue. Then throw the tissue away and wash your hands. If you don t have a tissue, cough and sneeze into the crook of your elbow.    Stay home until at least 24 hours after you no longer have a fever or chills. Be sure the fever isn t being hidden by fever-reducing medication.    Don t share food, utensils, drinking glasses, or a toothbrush with others.    Ask your health care provider if others in your household should receive antiviral medication to help them avoid infection.  How Can the Flu Be Prevented?    One of the best ways to avoid the flu is to get a flu vaccination  each year. Viruses that cause the flu change from year to year. For that reason, doctors recommend getting the flu vaccine each year, as soon as it's available in your area. The vaccine may be given as a shot or as a nasal spray. Your health care provider can tell you which vaccine is right for you.    Wash your hands often. Frequent handwashing is a proven way to help prevent infection.    Carry an alcohol-based hand gel containing at least 60 percent alcohol. Use it when you don t have access to soap and water. Then wash your hands as soon as you can.    Avoid touching your eyes, nose, and mouth.    At home and work, clean phones, computer keyboards, and toys often with disinfectant wipes.    If possible, avoid close contact with others who have the flu or symptoms of the flu.  Handwashing Tips  Handwashing is one of the best ways to prevent many common infections. If you re caring for or visiting someone with the flu, wash your hands each time you enter and leave the room. Follow these steps:    Use warm water and plenty of soap. Rub your hands together well.    Clean the whole hand, under your nails, between your fingers, and up the wrists.    Wash for at least 15 seconds.    Rinse, letting the water run down your fingers, not up your wrists.    Dry your hands well. Use a paper towel to turn off the faucet and open the door.  Using Alcohol-Based Hand   Alcohol-based hand  are also a good choice. Use them when you don t have access to soap and water. Follow these steps:    Squeeze about a tablespoon of gel into the palm of one hand.    Rub your hands together briskly, cleaning the backs of your hands, the palms, between your fingers, and up the wrists.    Rub until the gel is gone and your hands are completely dry.  Preventing Influenza in Healthcare Settings  The flu is a special concern for people in hospitals and long-term care facilities. To help prevent the spread of flu, many hospitals and  nursing homes take these steps:    Health care providers wash their hands or use an alcohol-based hand  before and after treating each patient.    People with the flu have private rooms and bathrooms or share a room with someone with the same infection.    High-risk patients who don t have the flu are encouraged to get the flu and pneumonia vaccines.    All health care workers are encouraged or required to get flu shots.        3054-6665 [employer]. [address] [place] [address]. All rights reserved. This information is not intended as a substitute for professional medical care. Always follow your healthcare professional's instructions.              Follow-ups after your visit        Your next 10 appointments already scheduled     Feb 27, 2017  1:15 PM CST   SHORT with Juan Diego Coronado PA-C   [facility] ([facility])    [address] [place] [id number] [phone]              Who to contact     If you have questions or need follow up information about today's clinic visit or your schedule please contact [facility] directly at [phone].  Normal or non-critical lab and imaging results will be communicated to you by [employer], letter or phone within 4 business days after the clinic has received the results. If you do not hear from us within 7 days, please contact the clinic through [employer] or phone. If you have a critical or abnormal lab result, we will notify you by phone as soon as possible.  Submit refill requests through [employer] or call your pharmacy and they will forward the refill request to us. Please allow 3 business days for your refill to be completed.          Additional Information About Your Visit        [employer] Information     [employer] gives you secure access to your electronic health record. If you see a primary care provider, you can also send messages to your care team and make  appointments. If you have questions, please call your primary care clinic.  If you do not have a primary care provider, please call [phone] and they will assist you.        Care EveryWhere ID     This is your Care EveryWhere ID. This could be used by other organizations to access your [place] medical records  [id number]        Your Vitals Were     Pulse Temperature Respirations BMI (Body Mass Index)          78 98.7  F (37.1  C) (Oral) 14 30.7 kg/m2         Blood Pressure from Last 3 Encounters:   02/27/17 124/82   02/03/17 120/80   01/22/17 118/84    Weight from Last 3 Encounters:   02/27/17 196 lb (88.9 kg)   02/03/17 198 lb (89.8 kg)   01/22/17 199 lb (90.3 kg)              We Performed the Following     Influenza A/B antigen          Today's Medication Changes          These changes are accurate as of: 2/27/17  9:12 AM.  If you have any questions, ask your nurse or doctor.               Start taking these medicines.        Dose/Directions    albuterol 108 (90 BASE) MCG/ACT Inhaler   Commonly known as:  PROAIR HFA/PROVENTIL HFA/VENTOLIN HFA   Used for:  Influenza   Started by:  Juan Diego Coronado PA-C        Dose:  2 puff   Inhale 2 puffs into the lungs every 6 hours as needed for shortness of breath / dyspnea or wheezing   Quantity:  1 Inhaler   Refills:  0       benzonatate 100 MG capsule   Commonly known as:  TESSALON   Used for:  Influenza   Started by:  Juan Diego Coronado PA-C        Dose:  100 mg   Take 1 capsule (100 mg) by mouth 3 times daily as needed   Quantity:  42 capsule   Refills:  0       guaiFENesin-codeine 100-10 MG/5ML Soln solution   Commonly known as:  ROBITUSSIN AC   Used for:  Influenza   Started by:  Juan Diego Coronado PA-C        Dose:  1 tsp.   Take 5 mLs by mouth every 4 hours as needed for cough   Quantity:  120 mL   Refills:  0       oseltamivir 75 MG capsule   Commonly known as:  TAMIFLU   Used for:  Influenza   Started by:  Juan Diego Coronado PA-C        Dose:   75 mg   Take 1 capsule (75 mg) by mouth 2 times daily   Quantity:  10 capsule   Refills:  0            Where to get your medicines      These medications were sent to [address] [address]     Phone:  [phone]     albuterol 108 (90 BASE) MCG/ACT Inhaler    benzonatate 100 MG capsule    oseltamivir 75 MG capsule         Some of these will need a paper prescription and others can be bought over the counter.  Ask your nurse if you have questions.     Bring a paper prescription for each of these medications     guaiFENesin-codeine 100-10 MG/5ML Soln solution                Primary Care Provider Office Phone # Fax #    Susan Rachele Haase, APRN [phone]       [facility] [address] [place] [address]        Thank you!     Thank you for choosing [facility]  for your care. Our goal is always to provide you with excellent care. Hearing back from our patients is one way we can continue to improve our services. Please take a few minutes to complete the written survey that you may receive in the mail after your visit with us. Thank you!             Your Updated Medication List - Protect others around you: Learn how to safely use, store and throw away your medicines at www.disposemymeds.org.          This list is accurate as of: 2/27/17  9:12 AM.  Always use your most recent med list.                   Brand Name Dispense Instructions for use    albuterol 108 (90 BASE) MCG/ACT Inhaler    PROAIR HFA/PROVENTIL HFA/VENTOLIN HFA    1 Inhaler    Inhale 2 puffs into the lungs every 6 hours as needed for shortness of breath / dyspnea or wheezing       amLODIPine-benazepril 10-40 MG per capsule    LOTREL    90 capsule    Take 1 capsule by mouth daily       benzonatate 100 MG capsule    TESSALON    42 capsule    Take 1 capsule (100 mg) by mouth 3 times daily as needed        FLUoxetine 20 MG capsule    PROzac    30 capsule    Take 1 capsule (20 mg) by mouth daily       guaiFENesin-codeine 100-10 MG/5ML Soln solution    ROBITUSSIN AC    120 mL    Take 5 mLs by mouth every 4 hours as needed for cough       LORazepam 0.5 MG tablet    ATIVAN    10 tablet    Take 1 tablet (0.5 mg) by mouth every 8 hours as needed for anxiety       oseltamivir 75 MG capsule    TAMIFLU    10 capsule    Take 1 capsule (75 mg) by mouth 2 times daily       tacrolimus 0.1 % ointment    PROTOPIC    60 g    Apply topically 2 times daily       valACYclovir 1000 mg tablet    VALTREX    90 tablet    Take 1 tablet (1,000 mg) by mouth daily

## 2017-02-27 NOTE — PATIENT INSTRUCTIONS
Influenza  Influenza ( the flu ) is an infection that affects your respiratory tract (the mouth, nose, and lungs, and the passages between them). Unlike a cold, the flu can make you very ill. And it can lead to pneumonia, a serious lung infection. For some people, especially older adults, young children, and people with certain chronic conditions, the flu can have serious complications and even be fatal.  What Are the Risk Factors for the Flu?     Viruses that cause influenza spread through the air in droplets when someone who has the flu coughs, sneezes, laughs, or talks.   Anyone can get the flu. But you re more likely to become infected if you:    Have a weakened immune system.    Work in a health care setting where you may be exposed to flu germs.    Live or work with someone who has the flu.    Haven t received an annual flu shot.  How Does the Flu Spread?  The flu is caused by viruses. The viruses spread through the air in droplets when someone who has the flu coughs, sneezes, laughs, or talks. You can become infected when you inhale these viruses directly. You can also become infected when you touch a surface on which the droplets have landed and then transfer the germs to your eyes, nose, or mouth. Touching used tissues, or sharing utensils, drinking glasses, or a toothbrush with an infected person can expose you to flu viruses, too.  What Are the Symptoms of the Flu?  Flu symptoms tend to come on quickly and may last a few days to a few weeks. They include:    Fever usually higher than 101 F  (38.3 C) and chills    Sore throat and headache    Dry cough    Runny nose    Tiredness and weakness    Muscle aches  Factors That Can Make Flu Worse  For some people, the flu can be very serious. The risk of complications is greater for:    Children under age 5.    Adults 65 years of age and older.    People with a chronic illness, such as diabetes or heart, kidney, or lung disease.    People who live in a nursing  home or long-term care facility.   How Is the Flu Treated?  Influenza usually improves after 7 days or so. In some cases, your health care provider may prescribe an antiviral medication. This may help you get well sooner. For the medication to help, you need to take it as soon as possible (ideally within 48 hours) after your symptoms start. If you develop pneumonia or other serious illness, hospital care may be needed.  Easing Flu Symptoms    Drink lots of fluids such as water, juice, and warm soup. A good rule is to drink enough so that you urinate your normal amount.    Get plenty of rest.    Ask your health care provider what to take for fever and pain.    Call your provider if your fever rises over 101 F (38.3 C) or you become dizzy, lightheaded, or short of breath.  Taking Steps to Protect Others    Wash your hands often, especially after coughing or sneezing. Or, clean your hands with an alcohol-based hand  containing at least 60 percent alcohol.    Cough or sneeze into a tissue. Then throw the tissue away and wash your hands. If you don t have a tissue, cough and sneeze into the crook of your elbow.    Stay home until at least 24 hours after you no longer have a fever or chills. Be sure the fever isn t being hidden by fever-reducing medication.    Don t share food, utensils, drinking glasses, or a toothbrush with others.    Ask your health care provider if others in your household should receive antiviral medication to help them avoid infection.  How Can the Flu Be Prevented?    One of the best ways to avoid the flu is to get a flu vaccination each year. Viruses that cause the flu change from year to year. For that reason, doctors recommend getting the flu vaccine each year, as soon as it's available in your area. The vaccine may be given as a shot or as a nasal spray. Your health care provider can tell you which vaccine is right for you.    Wash your hands often. Frequent handwashing is a proven way  to help prevent infection.    Carry an alcohol-based hand gel containing at least 60 percent alcohol. Use it when you don t have access to soap and water. Then wash your hands as soon as you can.    Avoid touching your eyes, nose, and mouth.    At home and work, clean phones, computer keyboards, and toys often with disinfectant wipes.    If possible, avoid close contact with others who have the flu or symptoms of the flu.  Handwashing Tips  Handwashing is one of the best ways to prevent many common infections. If you re caring for or visiting someone with the flu, wash your hands each time you enter and leave the room. Follow these steps:    Use warm water and plenty of soap. Rub your hands together well.    Clean the whole hand, under your nails, between your fingers, and up the wrists.    Wash for at least 15 seconds.    Rinse, letting the water run down your fingers, not up your wrists.    Dry your hands well. Use a paper towel to turn off the faucet and open the door.  Using Alcohol-Based Hand   Alcohol-based hand  are also a good choice. Use them when you don t have access to soap and water. Follow these steps:    Squeeze about a tablespoon of gel into the palm of one hand.    Rub your hands together briskly, cleaning the backs of your hands, the palms, between your fingers, and up the wrists.    Rub until the gel is gone and your hands are completely dry.  Preventing Influenza in Healthcare Settings  The flu is a special concern for people in hospitals and long-term care facilities. To help prevent the spread of flu, many hospitals and nursing homes take these steps:    Health care providers wash their hands or use an alcohol-based hand  before and after treating each patient.    People with the flu have private rooms and bathrooms or share a room with someone with the same infection.    High-risk patients who don t have the flu are encouraged to get the flu and pneumonia  vaccines.    All health care workers are encouraged or required to get flu shots.        4866-3021 [employer]. [address] [place] [address], [place] [address]. All rights reserved. This information is not intended as a substitute for professional medical care. Always follow your healthcare professional's instructions.

## 2017-02-27 NOTE — NURSING NOTE
"Chief Complaint   Patient presents with     Cough     Initial /82 (BP Location: Right arm, Patient Position: Chair, Cuff Size: Adult Large)  Pulse 78  Temp 98.7  F (37.1  C) (Oral)  Resp 14  Wt 196 lb (88.9 kg)  BMI 30.7 kg/m2 Estimated body mass index is 30.7 kg/(m^2) as calculated from the following:    Height as of 1/22/17: 5' 7\" (1.702 m).    Weight as of this encounter: 196 lb (88.9 kg)..  BP completed using cuff size large-RA    "

## 2017-03-08 DIAGNOSIS — J11.1 INFLUENZA-LIKE ILLNESS: Primary | ICD-10-CM

## 2017-03-08 DIAGNOSIS — J11.1 INFLUENZA-LIKE ILLNESS: ICD-10-CM

## 2017-03-08 LAB
FLUAV+FLUBV AG SPEC QL: NEGATIVE
FLUAV+FLUBV AG SPEC QL: NORMAL
SPECIMEN SOURCE: NORMAL

## 2017-03-08 PROCEDURE — 87804 INFLUENZA ASSAY W/OPTIC: CPT | Performed by: PHYSICIAN ASSISTANT

## 2017-03-29 DIAGNOSIS — R05.9 COUGH: Primary | ICD-10-CM

## 2017-03-29 LAB
FLUAV+FLUBV AG SPEC QL: NEGATIVE
FLUAV+FLUBV AG SPEC QL: NORMAL
SPECIMEN SOURCE: NORMAL

## 2017-03-29 PROCEDURE — 87804 INFLUENZA ASSAY W/OPTIC: CPT | Performed by: NURSE PRACTITIONER

## 2017-03-30 DIAGNOSIS — F41.9 ANXIETY: ICD-10-CM

## 2017-03-30 RX ORDER — LORAZEPAM 0.5 MG/1
0.5 TABLET ORAL EVERY 8 HOURS PRN
Qty: 10 TABLET | Refills: 0 | OUTPATIENT
Start: 2017-03-30

## 2017-03-30 NOTE — TELEPHONE ENCOUNTER
Routing refill request to provider for review/approval because:  Drug not on the [facility] refill protocol   Chikis Caro RN, BS  Clinical Nurse Triage.

## 2017-03-30 NOTE — TELEPHONE ENCOUNTER
Controlled Substance Refill Request for Lorazepam 0.5mg  Problem List Complete:  No     PROVIDER TO CONSIDER COMPLETION OF PROBLEM LIST AND OVERVIEW/CONTROLLED SUBSTANCE AGREEMENT    Last Written Prescription Date:  02/03/17  Last Fill Quantity: 10,   # refills: 0    Last Office Visit with [facility] primary care provider: 02/27/17    Future Office visit:     Controlled substance agreement on file: No.     Processing:  Fax Rx to [phone] pharmacy   checked in past 6 months?  No, route to RN     Thank you,  Christa Durbin  [facility]  [phone]

## 2017-04-05 ENCOUNTER — OFFICE VISIT (OUTPATIENT)
Dept: FAMILY MEDICINE | Facility: CLINIC | Age: 47
End: 2017-04-05
Payer: COMMERCIAL

## 2017-04-05 VITALS
OXYGEN SATURATION: 99 % | SYSTOLIC BLOOD PRESSURE: 130 MMHG | RESPIRATION RATE: 12 BRPM | HEART RATE: 82 BPM | TEMPERATURE: 97.9 F | DIASTOLIC BLOOD PRESSURE: 80 MMHG | WEIGHT: 196 LBS | BODY MASS INDEX: 30.7 KG/M2

## 2017-04-05 DIAGNOSIS — E55.9 VITAMIN D DEFICIENCY: ICD-10-CM

## 2017-04-05 DIAGNOSIS — F41.9 ANXIETY: ICD-10-CM

## 2017-04-05 DIAGNOSIS — I10 HYPERTENSION GOAL BP (BLOOD PRESSURE) < 140/90: Primary | ICD-10-CM

## 2017-04-05 LAB
BASOPHILS # BLD AUTO: 0 10E9/L (ref 0–0.2)
BASOPHILS NFR BLD AUTO: 0.3 %
DIFFERENTIAL METHOD BLD: NORMAL
EOSINOPHIL # BLD AUTO: 0.1 10E9/L (ref 0–0.7)
EOSINOPHIL NFR BLD AUTO: 1 %
ERYTHROCYTE [DISTWIDTH] IN BLOOD BY AUTOMATED COUNT: 13.8 % (ref 10–15)
HCT VFR BLD AUTO: 40 % (ref 35–47)
HGB BLD-MCNC: 13.8 G/DL (ref 11.7–15.7)
LYMPHOCYTES # BLD AUTO: 1.9 10E9/L (ref 0.8–5.3)
LYMPHOCYTES NFR BLD AUTO: 32.9 %
MCH RBC QN AUTO: 27.5 PG (ref 26.5–33)
MCHC RBC AUTO-ENTMCNC: 34.5 G/DL (ref 31.5–36.5)
MCV RBC AUTO: 80 FL (ref 78–100)
MONOCYTES # BLD AUTO: 0.5 10E9/L (ref 0–1.3)
MONOCYTES NFR BLD AUTO: 8 %
NEUTROPHILS # BLD AUTO: 3.4 10E9/L (ref 1.6–8.3)
NEUTROPHILS NFR BLD AUTO: 57.8 %
PLATELET # BLD AUTO: 216 10E9/L (ref 150–450)
RBC # BLD AUTO: 5.02 10E12/L (ref 3.8–5.2)
WBC # BLD AUTO: 5.9 10E9/L (ref 4–11)

## 2017-04-05 PROCEDURE — 99213 OFFICE O/P EST LOW 20 MIN: CPT | Performed by: NURSE PRACTITIONER

## 2017-04-05 PROCEDURE — 36415 COLL VENOUS BLD VENIPUNCTURE: CPT | Performed by: NURSE PRACTITIONER

## 2017-04-05 PROCEDURE — 80050 GENERAL HEALTH PANEL: CPT | Performed by: NURSE PRACTITIONER

## 2017-04-05 PROCEDURE — 82306 VITAMIN D 25 HYDROXY: CPT | Performed by: NURSE PRACTITIONER

## 2017-04-05 RX ORDER — LORAZEPAM 0.5 MG/1
0.5 TABLET ORAL EVERY 8 HOURS PRN
Qty: 10 TABLET | Refills: 0 | Status: SHIPPED | OUTPATIENT
Start: 2017-04-05 | End: 2017-06-02

## 2017-04-05 ASSESSMENT — ANXIETY QUESTIONNAIRES
3. WORRYING TOO MUCH ABOUT DIFFERENT THINGS: NEARLY EVERY DAY
5. BEING SO RESTLESS THAT IT IS HARD TO SIT STILL: NOT AT ALL
IF YOU CHECKED OFF ANY PROBLEMS ON THIS QUESTIONNAIRE, HOW DIFFICULT HAVE THESE PROBLEMS MADE IT FOR YOU TO DO YOUR WORK, TAKE CARE OF THINGS AT HOME, OR GET ALONG WITH OTHER PEOPLE: EXTREMELY DIFFICULT
1. FEELING NERVOUS, ANXIOUS, OR ON EDGE: NEARLY EVERY DAY
2. NOT BEING ABLE TO STOP OR CONTROL WORRYING: NEARLY EVERY DAY
GAD7 TOTAL SCORE: 17
7. FEELING AFRAID AS IF SOMETHING AWFUL MIGHT HAPPEN: MORE THAN HALF THE DAYS
6. BECOMING EASILY ANNOYED OR IRRITABLE: NEARLY EVERY DAY

## 2017-04-05 ASSESSMENT — PATIENT HEALTH QUESTIONNAIRE - PHQ9: 5. POOR APPETITE OR OVEREATING: NEARLY EVERY DAY

## 2017-04-05 NOTE — MR AVS SNAPSHOT
After Visit Summary   4/5/2017    Lawanda Katz    MRN: [id number]           Patient Information     Date Of Birth          1970        Visit Information        Provider Department      4/5/2017 11:45 AM Haase, Susan Rachele, APRN CNP [facility]        Today's Diagnoses     Hypertension goal BP (blood pressure) < 140/90    -  1    Anxiety        Vitamin D deficiency           Follow-ups after your visit        Follow-up notes from your care team     Return in about 3 months (around 7/5/2017).      Your next 10 appointments already scheduled     Apr 05, 2017 11:45 AM CDT   SHORT with Susan Rachele Haase[facility]    [address] [place] [id number] [phone]              Who to contact     If you have questions or need follow up information about today's clinic visit or your schedule please contact [facility] directly at [phone].  Normal or non-critical lab and imaging results will be communicated to you by CIRQYhart, letter or phone within 4 business days after the clinic has received the results. If you do not hear from us within 7 days, please contact the clinic through MarketRiderst or phone. If you have a critical or abnormal lab result, we will notify you by phone as soon as possible.  Submit refill requests through Modulus or call your pharmacy and they will forward the refill request to us. Please allow 3 business days for your refill to be completed.          Additional Information About Your Visit        MyChart Information     Modulus gives you secure access to your electronic health record. If you see a primary care provider, you can also send messages to your care team and make appointments. If you have questions, please call your primary care clinic.  If you do not have a primary care provider, please call [phone] and they will assist you.        Care  EveryWhere ID     This is your Care EveryWhere ID. This could be used by other organizations to access your [place] medical records  [id number]        Your Vitals Were     Pulse Temperature Respirations Pulse Oximetry BMI (Body Mass Index)       82 97.9  F (36.6  C) (Oral) 12 99% 30.7 kg/m2        Blood Pressure from Last 3 Encounters:   04/05/17 130/80   02/27/17 124/82   02/03/17 120/80    Weight from Last 3 Encounters:   04/05/17 196 lb (88.9 kg)   02/27/17 196 lb (88.9 kg)   02/03/17 198 lb (89.8 kg)              We Performed the Following     CBC with platelets differential     Comprehensive metabolic panel     TSH with free T4 reflex     Vitamin D Deficiency          Where to get your medicines      Some of these will need a paper prescription and others can be bought over the counter.  Ask your nurse if you have questions.     Bring a paper prescription for each of these medications     LORazepam 0.5 MG tablet          Primary Care Provider Office Phone # Fax #    Susan Rachele Haase, APRN [phone]       [address] [address]        Thank you!     Thank you for choosing [facility]  for your care. Our goal is always to provide you with excellent care. Hearing back from our patients is one way we can continue to improve our services. Please take a few minutes to complete the written survey that you may receive in the mail after your visit with us. Thank you!             Your Updated Medication List - Protect others around you: Learn how to safely use, store and throw away your medicines at www.disposemymeds.org.          This list is accurate as of: 4/5/17 11:13 AM.  Always use your most recent med list.                   Brand Name Dispense Instructions for use    albuterol 108 (90 BASE) MCG/ACT Inhaler    PROAIR HFA/PROVENTIL HFA/VENTOLIN HFA    1 Inhaler    Inhale 2 puffs into the lungs every 6 hours as needed for  shortness of breath / dyspnea or wheezing       amLODIPine-benazepril 10-40 MG per capsule    LOTREL    90 capsule    Take 1 capsule by mouth daily       benzonatate 100 MG capsule    TESSALON    42 capsule    Take 1 capsule (100 mg) by mouth 3 times daily as needed       FLUoxetine 20 MG capsule    PROzac    30 capsule    Take 1 capsule (20 mg) by mouth daily       guaiFENesin-codeine 100-10 MG/5ML Soln solution    ROBITUSSIN AC    120 mL    Take 5 mLs by mouth every 4 hours as needed for cough       LORazepam 0.5 MG tablet    ATIVAN    10 tablet    Take 1 tablet (0.5 mg) by mouth every 8 hours as needed for anxiety       oseltamivir 75 MG capsule    TAMIFLU    10 capsule    Take 1 capsule (75 mg) by mouth 2 times daily       tacrolimus 0.1 % ointment    PROTOPIC    60 g    Apply topically 2 times daily       valACYclovir 1000 mg tablet    VALTREX    90 tablet    Take 1 tablet (1,000 mg) by mouth daily

## 2017-04-05 NOTE — PROGRESS NOTES
TIMBO Webber,  Your CBC (checks for anemia and infection) was normal.  Sincerely,   Susan Haase, CNP

## 2017-04-05 NOTE — PROGRESS NOTES
"  SUBJECTIVE:                                                    Lawanda Katz is a 47 year old female who presents to clinic today for the following health issues:    Anxiety Follow-Up    Status since last visit: No change    Other associated symptoms:None    Complicating factors:   Significant life event: No   Current substance abuse: None  Depression symptoms: Yes  PARKER-7 SCORE 1/4/2017 2/3/2017 2/6/2017   Total Score - - -   Total Score 16 16 12        GAD7       Amount of exercise or physical activity: None    Problems taking medications regularly: No    Medication side effects: none    Diet: regular (no restrictions)    Lawanda reports emotions have been up and down. She was started on 20MG fluoxetine daily on 1/4. Was taking consistently for a while which helped but started to forget to take for some time. Recently started taking again due to increase in stress/anxiety. Issues w/  have continued. Describes stress about several events planned for summer and hosting guests (ex: separate graduations parties for each side of family and wedding in beginning of June). Looking to divorce after graduation and wedding. Feels that  has become more controlling. Ready to \"move on.\" Denies thoughts of harming self or others.    Also states not feeling well since dx w/ influenza 2/27. She is having constant forehead headaches and occasional aches. 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 Feels very congested, attributes to allergies. Sleeping fine. Denies rhinorrhea, sinus pressure, or cough.     PHQ-9: 10  PARKER-7: 17    Problem list and histories reviewed & adjusted, as indicated.  Additional history: as documented    Patient Active Problem List   Diagnosis     Hypertension goal BP (blood pressure) < 140/90     Recurrent cold sores     S/P hysterectomy     CARDIOVASCULAR SCREENING; LDL GOAL LESS THAN 160     Anxiety     Vitamin D deficiency     Past " Surgical History:   Procedure Laterality Date     ADENOIDECTOMY       CYSTOSCOPY  2007     HYSTERECTOMY, PAP NO LONGER INDICATED  2010    BSO, cervix removed, for dysmenorrhea     TUBAL LIGATION  1999     US BREAST BIOPSY LT  1/2013    left, benign       Social History   Substance Use Topics     Smoking status: Never Smoker     Smokeless tobacco: Never Used     Alcohol use No     Family History   Problem Relation Age of Onset     HEART DISEASE Mother      Unknown/Adopted No family hx of          Current Outpatient Prescriptions   Medication Sig Dispense Refill     benzonatate (TESSALON) 100 MG capsule Take 1 capsule (100 mg) by mouth 3 times daily as needed 42 capsule 0     LORazepam (ATIVAN) 0.5 MG tablet Take 1 tablet (0.5 mg) by mouth every 8 hours as needed for anxiety 10 tablet 0     FLUoxetine (PROZAC) 20 MG capsule Take 1 capsule (20 mg) by mouth daily 30 capsule 11     tacrolimus (PROTOPIC) 0.1 % ointment Apply topically 2 times daily 60 g 0     amLODIPine-benazepril (LOTREL) 10-40 MG per capsule Take 1 capsule by mouth daily 90 capsule 3     valACYclovir (VALTREX) 1000 mg tablet Take 1 tablet (1,000 mg) by mouth daily 90 tablet 1     oseltamivir (TAMIFLU) 75 MG capsule Take 1 capsule (75 mg) by mouth 2 times daily (Patient not taking: Reported on 4/5/2017) 10 capsule 0     guaiFENesin-codeine (ROBITUSSIN AC) 100-10 MG/5ML SOLN solution Take 5 mLs by mouth every 4 hours as needed for cough (Patient not taking: Reported on 4/5/2017) 120 mL 0     albuterol (PROAIR HFA/PROVENTIL HFA/VENTOLIN HFA) 108 (90 BASE) MCG/ACT Inhaler Inhale 2 puffs into the lungs every 6 hours as needed for shortness of breath / dyspnea or wheezing (Patient not taking: Reported on 4/5/2017) 1 Inhaler 0     Allergies   Allergen Reactions     Hydrochlorothiazide Nausea and Diarrhea     Metronidazole      Wellbutrin [Bupropion]      Tingling sensation       Morphine Rash     Reviewed and updated as needed this visit by clinical  staff  Tobacco  Allergies  Med Hx  Surg Hx  Fam Hx  Soc Hx      Reviewed and updated as needed this visit by Provider    ROS:  Constitutional, HEENT, cardiovascular, pulmonary, GI, , musculoskeletal, neuro, skin, endocrine and psych systems are negative, except as otherwise noted.    This document serves as a record of the services and decisions personally performed and made by Susan Haase, CNP. It was created on her behalf by Domenica Mendes, a trained medical scribe. The creation of this document is based the provider's statements to the medical scribe.  Domenica Mendes April 5, 2017 10:59 AM   OBJECTIVE:                                                    /80 (BP Location: Left arm, Patient Position: Chair, Cuff Size: Adult Regular)  Pulse 82  Temp 97.9  F (36.6  C) (Oral)  Resp 12  Wt 88.9 kg (196 lb)  SpO2 99%  BMI 30.7 kg/m2  Body mass index is 30.7 kg/(m^2).  GENERAL: healthy, alert and no distress, obese  HENT: ear canals and TM's normal, nose and mouth without ulcers or lesions, L nare w/ mild erythema  NECK: no adenopathy, no asymmetry, masses, or scars and thyroid normal to palpation  RESP: lungs clear to auscultation - no rales, rhonchi or wheezes  CV: regular rate and rhythm, normal S1 S2, no S3 or S4, no murmur, click or rub, no peripheral edema   NEURO: Normal strength and tone, mentation intact and speech normal  PSYCH: mentation appears normal, affect normal/bright    Diagnostic Test Results:  none      ASSESSMENT/PLAN:                                                      Lawanda was seen today for recheck medication.    Diagnoses and all orders for this visit:  Anxiety:  Has started back on fluoxetine 20 mg every day, strongly encouraged to stay on this medication for at least 6 months.   -     LORazepam (ATIVAN) 0.5 MG tablet; Take 1 tablet (0.5 mg) by mouth every 8 hours as needed for anxiety  -     CBC with platelets differential  -     TSH with free T4  reflex    Hypertension goal BP (blood pressure) < 140/90: well controlled, 130/80.  Continue taking amlodipine 10 mg and benazepril 40 mg every day.  -     Comprehensive metabolic panel      Vitamin D deficiency  -     Vitamin D Deficiency      Follow up in 2-3 months, sooner if symptoms worsen or do not improve.    The information in this document, created by the medical scribe for me, accurately reflects the services I personally performed and the decisions made by me. I have reviewed and approved this document for accuracy.   Susan Haase, CNP Susan Haase, APRN CNP  [facility]

## 2017-04-05 NOTE — NURSING NOTE
"Chief Complaint   Patient presents with     Recheck Medication       Initial /80 (BP Location: Left arm, Patient Position: Chair, Cuff Size: Adult Regular)  Pulse 82  Temp 97.9  F (36.6  C) (Oral)  Resp 12  Wt 196 lb (88.9 kg)  SpO2 99%  BMI 30.7 kg/m2 Estimated body mass index is 30.7 kg/(m^2) as calculated from the following:    Height as of 1/22/17: 5' 7\" (1.702 m).    Weight as of this encounter: 196 lb (88.9 kg).  Medication Reconciliation: complete   Francesca Boykin CMA      "

## 2017-04-06 LAB
ALBUMIN SERPL-MCNC: 4 G/DL (ref 3.4–5)
ALP SERPL-CCNC: 80 U/L (ref 40–150)
ALT SERPL W P-5'-P-CCNC: 40 U/L (ref 0–50)
ANION GAP SERPL CALCULATED.3IONS-SCNC: 7 MMOL/L (ref 3–14)
AST SERPL W P-5'-P-CCNC: 21 U/L (ref 0–45)
BILIRUB SERPL-MCNC: 0.3 MG/DL (ref 0.2–1.3)
BUN SERPL-MCNC: 8 MG/DL (ref 7–30)
CALCIUM SERPL-MCNC: 9 MG/DL (ref 8.5–10.1)
CHLORIDE SERPL-SCNC: 105 MMOL/L (ref 94–109)
CO2 SERPL-SCNC: 29 MMOL/L (ref 20–32)
CREAT SERPL-MCNC: 0.64 MG/DL (ref 0.52–1.04)
DEPRECATED CALCIDIOL+CALCIFEROL SERPL-MC: 14 UG/L (ref 20–75)
GFR SERPL CREATININE-BSD FRML MDRD: NORMAL ML/MIN/1.7M2
GLUCOSE SERPL-MCNC: 90 MG/DL (ref 70–99)
POTASSIUM SERPL-SCNC: 3.8 MMOL/L (ref 3.4–5.3)
PROT SERPL-MCNC: 8.1 G/DL (ref 6.8–8.8)
SODIUM SERPL-SCNC: 141 MMOL/L (ref 133–144)
TSH SERPL DL<=0.005 MIU/L-ACNC: 1.83 MU/L (ref 0.4–4)

## 2017-04-06 ASSESSMENT — ANXIETY QUESTIONNAIRES: GAD7 TOTAL SCORE: 17

## 2017-04-06 ASSESSMENT — PATIENT HEALTH QUESTIONNAIRE - PHQ9: SUM OF ALL RESPONSES TO PHQ QUESTIONS 1-9: 10

## 2017-04-06 NOTE — PROGRESS NOTES
Andrew Webber,   Your lab results are as below:  1)  TSH (thyroid level) 1.83 which is normal (range 0.4-4)  2) Glucose was normal at 90 (normal fasting is <100).  If you have any questions do not hesitate to call the clinic to discuss the results with me further.     Sincerely,    Susan Haase, CNP

## 2017-04-06 NOTE — PROGRESS NOTES
Andrew Webber,  Your vitamin d level was low at 14, normal is 20-75. Having a low vitamin D level can cause body aches and fatigue.  I would like you to take vitamin D 50,000 iu every week for the next 12 weeks.  I have sent in a prescriptions for vitamin D to your pharmacy.  After 12 weeks I would like you to come in for a lab only appointment to recheck your vitamin D level, you do not have to be fasting for that lab appointment.  Sincerely,  Susan Haase, CNP

## 2017-04-07 ENCOUNTER — MYC MEDICAL ADVICE (OUTPATIENT)
Dept: FAMILY MEDICINE | Facility: CLINIC | Age: 47
End: 2017-04-07

## 2017-04-07 DIAGNOSIS — E55.9 VITAMIN D DEFICIENCY: Primary | ICD-10-CM

## 2017-04-24 DIAGNOSIS — F41.9 ANXIETY: ICD-10-CM

## 2017-04-24 NOTE — TELEPHONE ENCOUNTER
Controlled Substance Refill Request for Lorazepam  Problem List Complete:  No     PROVIDER TO CONSIDER COMPLETION OF PROBLEM LIST AND OVERVIEW/CONTROLLED SUBSTANCE AGREEMENT    Last Written Prescription Date:  4/5/17  Last Fill Quantity: 10,   # refills: 0    Last Office Visit with [facility] primary care provider: 4/5/17    Future Office visit:     Controlled substance agreement on file: No.     Processing:  Staff will hand deliver Rx to on-site pharmacy   checked in past 6 months?  No, route to RN     Mary Lerma, Pharmacy [facility]

## 2017-04-25 ENCOUNTER — MYC MEDICAL ADVICE (OUTPATIENT)
Dept: FAMILY MEDICINE | Facility: CLINIC | Age: 47
End: 2017-04-25

## 2017-04-25 DIAGNOSIS — F41.9 ANXIETY: Primary | ICD-10-CM

## 2017-04-26 RX ORDER — HYDROXYZINE HYDROCHLORIDE 25 MG/1
25-50 TABLET, FILM COATED ORAL EVERY 6 HOURS PRN
Qty: 60 TABLET | Refills: 1 | Status: SHIPPED | OUTPATIENT
Start: 2017-04-26 | End: 2017-08-03

## 2017-04-26 NOTE — TELEPHONE ENCOUNTER
Please see [facility] message. Do you recall prescribing an alternate anxiety medication?      Lorazepam      Last Written Prescription Date:  4/5/2017  Last Fill Quantity: 10,   # refills: 0  Last Office Visit with [facility], [facility] or [facility] prescribing provider: 4/5/2017  Future Office visit:       Routing refill request to provider for review/approval because:  Drug not on the [facility], [facility] or  [facility] refill protocol or controlled substance    Kristin Mulligan RN, BSN, PHN

## 2017-04-27 RX ORDER — LORAZEPAM 0.5 MG/1
0.5 TABLET ORAL EVERY 8 HOURS PRN
Qty: 10 TABLET | Refills: 0 | OUTPATIENT
Start: 2017-04-27

## 2017-04-27 NOTE — TELEPHONE ENCOUNTER
Looks like PCP sent in Atarax, ok to fill Lorazepam as well?        Routing refill request to provider for review/approval because:  Drug not on the [facility], [facility] or [facility] refill protocol or controlled substance    Kristin Mulligan RN, BSN, PHN

## 2017-06-02 ENCOUNTER — OFFICE VISIT (OUTPATIENT)
Dept: FAMILY MEDICINE | Facility: CLINIC | Age: 47
End: 2017-06-02
Payer: COMMERCIAL

## 2017-06-02 VITALS
DIASTOLIC BLOOD PRESSURE: 80 MMHG | RESPIRATION RATE: 14 BRPM | HEART RATE: 84 BPM | BODY MASS INDEX: 30.7 KG/M2 | WEIGHT: 196 LBS | TEMPERATURE: 98.2 F | SYSTOLIC BLOOD PRESSURE: 124 MMHG

## 2017-06-02 DIAGNOSIS — F41.9 ANXIETY: Primary | ICD-10-CM

## 2017-06-02 PROCEDURE — 99214 OFFICE O/P EST MOD 30 MIN: CPT | Performed by: NURSE PRACTITIONER

## 2017-06-02 RX ORDER — LORAZEPAM 0.5 MG/1
0.5 TABLET ORAL EVERY 8 HOURS PRN
Qty: 2 TABLET | Refills: 0 | Status: SHIPPED | OUTPATIENT
Start: 2017-06-02 | End: 2017-08-24

## 2017-06-02 ASSESSMENT — ANXIETY QUESTIONNAIRES
5. BEING SO RESTLESS THAT IT IS HARD TO SIT STILL: SEVERAL DAYS
7. FEELING AFRAID AS IF SOMETHING AWFUL MIGHT HAPPEN: NEARLY EVERY DAY
6. BECOMING EASILY ANNOYED OR IRRITABLE: NEARLY EVERY DAY
IF YOU CHECKED OFF ANY PROBLEMS ON THIS QUESTIONNAIRE, HOW DIFFICULT HAVE THESE PROBLEMS MADE IT FOR YOU TO DO YOUR WORK, TAKE CARE OF THINGS AT HOME, OR GET ALONG WITH OTHER PEOPLE: EXTREMELY DIFFICULT
GAD7 TOTAL SCORE: 19
1. FEELING NERVOUS, ANXIOUS, OR ON EDGE: NEARLY EVERY DAY
2. NOT BEING ABLE TO STOP OR CONTROL WORRYING: NEARLY EVERY DAY
3. WORRYING TOO MUCH ABOUT DIFFERENT THINGS: NEARLY EVERY DAY

## 2017-06-02 ASSESSMENT — PATIENT HEALTH QUESTIONNAIRE - PHQ9: 5. POOR APPETITE OR OVEREATING: NEARLY EVERY DAY

## 2017-06-02 NOTE — NURSING NOTE
"Chief Complaint   Patient presents with     Depression     wondering about taking a week leave from work       Initial /80 (BP Location: Right arm, Patient Position: Chair, Cuff Size: Adult Regular)  Pulse 84  Temp 98.2  F (36.8  C) (Oral)  Resp 14  Wt 196 lb (88.9 kg)  BMI 30.7 kg/m2 Estimated body mass index is 30.7 kg/(m^2) as calculated from the following:    Height as of 1/22/17: 5' 7\" (1.702 m).    Weight as of this encounter: 196 lb (88.9 kg).  Medication Reconciliation: complete   Francesca Boykin CMA      "

## 2017-06-02 NOTE — MR AVS SNAPSHOT
After Visit Summary   6/2/2017    Lawanda Katz    MRN: [id number]           Patient Information     Date Of Birth          1970        Visit Information        Provider Department      6/2/2017 2:00 PM Haase, Susan Rachele, APRN CNP [facility]        Today's Diagnoses     Anxiety    -  1       Follow-ups after your visit        Follow-up notes from your care team     Return in about 3 days (around 6/5/2017).      Your next 10 appointments already scheduled     Jun 05, 2017  9:00 AM CDT   Office Visit with Susan Rachele Haase, APRN CNP   [facility] ([facility]    [address] [place] [id number] [phone]           Bring a current list of meds and any records pertaining to this visit.  For Physicals, please bring immunization records and any forms needing to be filled out.  Please arrive 10 minutes early to complete paperwork.              Who to contact     If you have questions or need follow up information about today's clinic visit or your schedule please contact [facility] directly at [phone].  Normal or non-critical lab and imaging results will be communicated to you by FoundationDBhart, letter or phone within 4 business days after the clinic has received the results. If you do not hear from us within 7 days, please contact the clinic through Urjanett or phone. If you have a critical or abnormal lab result, we will notify you by phone as soon as possible.  Submit refill requests through [email or web address] or call your pharmacy and they will forward the refill request to us. Please allow 3 business days for your refill to be completed.          Additional Information About Your Visit        FoundationDBhart Information     [email or web address] gives you secure access to your electronic health record. If you see a primary care provider, you can also send messages to your care team and make appointments. If you have questions,  please call your primary care clinic.  If you do not have a primary care provider, please call [phone] and they will assist you.        Care EveryWhere ID     This is your Care EveryWhere ID. This could be used by other organizations to access your [place] medical records  [id number]        Your Vitals Were     Pulse Temperature Respirations BMI (Body Mass Index)          84 98.2  F (36.8  C) (Oral) 14 30.7 kg/m2         Blood Pressure from Last 3 Encounters:   06/02/17 124/80   04/05/17 130/80   02/27/17 124/82    Weight from Last 3 Encounters:   06/02/17 196 lb (88.9 kg)   04/05/17 196 lb (88.9 kg)   02/27/17 196 lb (88.9 kg)              Today, you had the following     No orders found for display         Where to get your medicines      Some of these will need a paper prescription and others can be bought over the counter.  Ask your nurse if you have questions.     Bring a paper prescription for each of these medications     LORazepam 0.5 MG tablet          Primary Care Provider Office Phone # Fax #    Susan Rachele Haase, APRN [phone]       [address] [address]        Thank you!     Thank you for choosing [facility]  for your care. Our goal is always to provide you with excellent care. Hearing back from our patients is one way we can continue to improve our services. Please take a few minutes to complete the written survey that you may receive in the mail after your visit with us. Thank you!             Your Updated Medication List - Protect others around you: Learn how to safely use, store and throw away your medicines at www.disposemymeds.org.          This list is accurate as of: 6/2/17  8:30 PM.  Always use your most recent med list.                   Brand Name Dispense Instructions for use    amLODIPine-benazepril 10-40 MG per capsule    LOTREL    90 capsule    Take 1 capsule by mouth daily        cholecalciferol 14355 UNITS capsule    VITAMIN D3    12 capsule    Take 1 capsule (50,000 Units) by mouth once a week       FLUoxetine 20 MG capsule    PROzac    30 capsule    Take 1 capsule (20 mg) by mouth daily       hydrOXYzine 25 MG tablet    ATARAX    60 tablet    Take 1-2 tablets (25-50 mg) by mouth every 6 hours as needed for anxiety       LORazepam 0.5 MG tablet    ATIVAN    2 tablet    Take 1 tablet (0.5 mg) by mouth every 8 hours as needed for anxiety       tacrolimus 0.1 % ointment    PROTOPIC    60 g    Apply topically 2 times daily       valACYclovir 1000 mg tablet    VALTREX    90 tablet    Take 1 tablet (1,000 mg) by mouth daily

## 2017-06-02 NOTE — PROGRESS NOTES
SUBJECTIVE:                                                    Lawanda Katz is a 47 year old female who presents to clinic today for the following health issues:    Depression and Anxiety Follow-Up    Status since last visit: Worsened     Other associated symptoms:None    Complicating factors:     Significant life event: Yes-  Stressful life events     Current substance abuse: None    Increase in anxiety/depression d/t stressful life events, financial issues, and conflict with . States difficulty keeping up with work and then having to deal with several problems when she gets home.  is very mean and controlling and children do not respect her. Also gambling x1 weekly and has lost a lot of money. She has not been taking fluoxetine (20 mg) daily as prescribed. Occasionally taking lorazepam or hydroxyzine as needed for anxiety attacks. Has a counseling appt set up on 6/7/2017.  Plans to get a hotel room or stay with her daughter because she does not want to be at her home this weekend. Denies thoughts of harming self or others.    PHQ-9: 17  PARKER-7: 19    PHQ-9 SCORE 2/3/2017 2/6/2017 4/5/2017   Total Score - - -   Total Score 14 13 10     PARKER-7 SCORE 2/3/2017 2/6/2017 4/5/2017   Total Score - - -   Total Score 16 12 17        Hypertension:  Well controlled.        Amount of exercise or physical activity: None    Problems taking medications regularly: No    Medication side effects: none    Diet: regular (no restrictions)    Problem list and histories reviewed & adjusted, as indicated.  Additional history: as documented    Patient Active Problem List   Diagnosis     Hypertension goal BP (blood pressure) < 140/90     Recurrent cold sores     S/P hysterectomy     CARDIOVASCULAR SCREENING; LDL GOAL LESS THAN 160     Anxiety     Vitamin D deficiency     Past Surgical History:   Procedure Laterality Date     ADENOIDECTOMY       CYSTOSCOPY  2007     HYSTERECTOMY, PAP NO LONGER INDICATED  2010    BSO, cervix  removed, for dysmenorrhea     TUBAL LIGATION  1999      BREAST BIOPSY LT  1/2013    left, benign       Social History   Substance Use Topics     Smoking status: Never Smoker     Smokeless tobacco: Never Used     Alcohol use No     Family History   Problem Relation Age of Onset     HEART DISEASE Mother      Unknown/Adopted No family hx of          Current Outpatient Prescriptions   Medication Sig Dispense Refill     LORazepam (ATIVAN) 0.5 MG tablet Take 1 tablet (0.5 mg) by mouth every 8 hours as needed for anxiety 2 tablet 0     hydrOXYzine (ATARAX) 25 MG tablet Take 1-2 tablets (25-50 mg) by mouth every 6 hours as needed for anxiety 60 tablet 1     cholecalciferol (VITAMIN D3) 87222 UNITS capsule Take 1 capsule (50,000 Units) by mouth once a week 12 capsule 0     FLUoxetine (PROZAC) 20 MG capsule Take 1 capsule (20 mg) by mouth daily 30 capsule 11     tacrolimus (PROTOPIC) 0.1 % ointment Apply topically 2 times daily 60 g 0     amLODIPine-benazepril (LOTREL) 10-40 MG per capsule Take 1 capsule by mouth daily 90 capsule 3     valACYclovir (VALTREX) 1000 mg tablet Take 1 tablet (1,000 mg) by mouth daily 90 tablet 1     Allergies   Allergen Reactions     Hydrochlorothiazide Nausea and Diarrhea     Metronidazole      Wellbutrin [Bupropion]      Tingling sensation       Morphine Rash     Reviewed and updated as needed this visit by clinical staff  Tobacco  Allergies  Med Hx  Surg Hx  Fam Hx  Soc Hx      Reviewed and updated as needed this visit by Provider    ROS:  Constitutional, HEENT, cardiovascular, pulmonary, GI, , musculoskeletal, neuro, skin, endocrine and psych systems are negative, except as otherwise noted.    This document serves as a record of the services and decisions personally performed and made by Susan Haase, CNP. It was created on her behalf by Domenica Mendes, a trained medical scribe. The creation of this document is based the provider's statements to the medical scribe.  Domenica Mendes  June 2, 2017 10:44 AM   OBJECTIVE:                                                    /80 (BP Location: Right arm, Patient Position: Chair, Cuff Size: Adult Regular)  Pulse 84  Temp 98.2  F (36.8  C) (Oral)  Resp 14  Wt 88.9 kg (196 lb)  BMI 30.7 kg/m2  Body mass index is 30.7 kg/(m^2).  GENERAL: healthy, alert and no distress, obese   NEURO: Normal strength and tone, mentation intact and speech normal  PSYCH: mentation appears normal, affect tearful    Diagnostic Test Results:  none      ASSESSMENT/PLAN:                                                    Lawanda was seen today for depression.    Diagnoses and all orders for this visit:    Anxiety:  Will start back on  fluoxetine 20 mg on a daily basis, take hydroxyzine on an as needed basis, prescribed #2 ativan to take for panic attacks if occur.  Follow through with attending counseling on 6/7/2017.  -     LORazepam (ATIVAN) 0.5 MG tablet; Take 1 tablet (0.5 mg) by mouth every 8 hours as needed for anxiety    Hypertension:  BP stable at 124/80.    Discussed importance of not being alone this weekend. Is aware of emergency resources.  Report to emergency department if thoughts of self harm occur.    Follow up visit in 3 days, sooner as needed.    The information in this document, created by the medical scribe for me, accurately reflects the services I personally performed and the decisions made by me. I have reviewed and approved this document for accuracy.   Susan Haase, APRN [facility]

## 2017-06-03 ASSESSMENT — PATIENT HEALTH QUESTIONNAIRE - PHQ9: SUM OF ALL RESPONSES TO PHQ QUESTIONS 1-9: 17

## 2017-06-03 ASSESSMENT — ANXIETY QUESTIONNAIRES: GAD7 TOTAL SCORE: 19

## 2017-06-05 ENCOUNTER — TELEPHONE (OUTPATIENT)
Dept: FAMILY MEDICINE | Facility: CLINIC | Age: 47
End: 2017-06-05

## 2017-06-05 ENCOUNTER — OFFICE VISIT (OUTPATIENT)
Dept: FAMILY MEDICINE | Facility: CLINIC | Age: 47
End: 2017-06-05
Payer: COMMERCIAL

## 2017-06-05 VITALS
BODY MASS INDEX: 31.17 KG/M2 | WEIGHT: 199 LBS | TEMPERATURE: 98 F | SYSTOLIC BLOOD PRESSURE: 118 MMHG | HEART RATE: 86 BPM | OXYGEN SATURATION: 100 % | RESPIRATION RATE: 12 BRPM | DIASTOLIC BLOOD PRESSURE: 78 MMHG

## 2017-06-05 DIAGNOSIS — F41.9 ANXIETY: Primary | ICD-10-CM

## 2017-06-05 PROCEDURE — 99213 OFFICE O/P EST LOW 20 MIN: CPT | Performed by: NURSE PRACTITIONER

## 2017-06-05 ASSESSMENT — ANXIETY QUESTIONNAIRES
2. NOT BEING ABLE TO STOP OR CONTROL WORRYING: NEARLY EVERY DAY
6. BECOMING EASILY ANNOYED OR IRRITABLE: NEARLY EVERY DAY
5. BEING SO RESTLESS THAT IT IS HARD TO SIT STILL: SEVERAL DAYS
IF YOU CHECKED OFF ANY PROBLEMS ON THIS QUESTIONNAIRE, HOW DIFFICULT HAVE THESE PROBLEMS MADE IT FOR YOU TO DO YOUR WORK, TAKE CARE OF THINGS AT HOME, OR GET ALONG WITH OTHER PEOPLE: EXTREMELY DIFFICULT
GAD7 TOTAL SCORE: 19
7. FEELING AFRAID AS IF SOMETHING AWFUL MIGHT HAPPEN: NEARLY EVERY DAY
3. WORRYING TOO MUCH ABOUT DIFFERENT THINGS: NEARLY EVERY DAY
1. FEELING NERVOUS, ANXIOUS, OR ON EDGE: NEARLY EVERY DAY

## 2017-06-05 ASSESSMENT — PATIENT HEALTH QUESTIONNAIRE - PHQ9: 5. POOR APPETITE OR OVEREATING: NEARLY EVERY DAY

## 2017-06-05 NOTE — TELEPHONE ENCOUNTER
Reason for Call:  Other call back    Detailed comments: Pt calling with updated info from appointment today. Per patient previous form CAN be used. Question 5, part B, change no to yes. Estimated dates of leave 06/02/17 - 06/12/17. Return 06/13/17. Please fax to [phone]. Please call patient if questions.  Phone Number Patient can be reached at: Cell number on file:    Telephone Information:   Mobile [phone]       Best Time: any    Can we leave a detailed message on this number? YES    Call taken on 6/5/2017 at 10:37 AM by Ruth Behrens

## 2017-06-05 NOTE — LETTER
[facility]  [id number] [place] [address]  Phone: [phone]    June 5, 2017        Lawanda Katz  [address] [place] [address]          To whom it may concern:    RE: Lawanda Katz    Patient was seen and treated today at our clinic. Please excuse her from work from 6/2/2017 through 6/12/2017. She may return to work on 6/13/2017 without restrictions.     Please contact me for questions or concerns.      Sincerely,        Susan Haase, APRN CNP

## 2017-06-05 NOTE — MR AVS SNAPSHOT
After Visit Summary   6/5/2017    Lawanda Katz    MRN: [id number]           Patient Information     Date Of Birth          1970        Visit Information        Provider Department      6/5/2017 9:00 AM Haase, Susan Rachele, APRN CNP [facility]        Today's Diagnoses     Anxiety    -  1       Follow-ups after your visit        Follow-up notes from your care team     Return in about 3 months (around 9/5/2017).      Who to contact     If you have questions or need follow up information about today's clinic visit or your schedule please contact [facility] directly at [phone].  Normal or non-critical lab and imaging results will be communicated to you by MyChart, letter or phone within 4 business days after the clinic has received the results. If you do not hear from us within 7 days, please contact the clinic through [employer] or phone. If you have a critical or abnormal lab result, we will notify you by phone as soon as possible.  Submit refill requests through [employer] or call your pharmacy and they will forward the refill request to us. Please allow 3 business days for your refill to be completed.          Additional Information About Your Visit        MyChart Information     66[employer] gives you secure access to your electronic health record. If you see a primary care provider, you can also send messages to your care team and make appointments. If you have questions, please call your primary care clinic.  If you do not have a primary care provider, please call [phone] and they will assist you.        Care EveryWhere ID     This is your Care EveryWhere ID. This could be used by other organizations to access your [place] medical records  [id number]        Your Vitals Were     Pulse Temperature Respirations Pulse Oximetry BMI (Body Mass Index)       86 98  F (36.7  C) (Oral) 12 100% 31.17 kg/m2        Blood Pressure from Last 3 Encounters:   06/05/17  118/78   06/02/17 124/80   04/05/17 130/80    Weight from Last 3 Encounters:   06/05/17 199 lb (90.3 kg)   06/02/17 196 lb (88.9 kg)   04/05/17 196 lb (88.9 kg)              Today, you had the following     No orders found for display       Primary Care Provider Office Phone # Fax #    Susan Rachele Haase, DANIELE [phone]       [facility] [address] [place] [address]        Thank you!     Thank you for choosing [facility]  for your care. Our goal is always to provide you with excellent care. Hearing back from our patients is one way we can continue to improve our services. Please take a few minutes to complete the written survey that you may receive in the mail after your visit with us. Thank you!             Your Updated Medication List - Protect others around you: Learn how to safely use, store and throw away your medicines at www.disposemymeds.org.          This list is accurate as of: 6/5/17  9:34 AM.  Always use your most recent med list.                   Brand Name Dispense Instructions for use    amLODIPine-benazepril 10-40 MG per capsule    LOTREL    90 capsule    Take 1 capsule by mouth daily       cholecalciferol 61083 UNITS capsule    VITAMIN D3    12 capsule    Take 1 capsule (50,000 Units) by mouth once a week       FLUoxetine 20 MG capsule    PROzac    30 capsule    Take 1 capsule (20 mg) by mouth daily       hydrOXYzine 25 MG tablet    ATARAX    60 tablet    Take 1-2 tablets (25-50 mg) by mouth every 6 hours as needed for anxiety       LORazepam 0.5 MG tablet    ATIVAN    2 tablet    Take 1 tablet (0.5 mg) by mouth every 8 hours as needed for anxiety       tacrolimus 0.1 % ointment    PROTOPIC    60 g    Apply topically 2 times daily       valACYclovir 1000 mg tablet    VALTREX    90 tablet    Take 1 tablet (1,000 mg) by mouth daily

## 2017-06-05 NOTE — PROGRESS NOTES
SUBJECTIVE:                                                    Lawanda Katz is a 47 year old female who presents to clinic today for the following health issues:    Depression and Anxiety Follow-Up    Status since last visit: Improved     Other associated symptoms:None    Complicating factors:     Significant life event: No     Current substance abuse: None    Lawanda reports that she is doing better. She returned home instead of getting a hotel room for the weekend. States minimal conflict with  because he works over the weekend. Upcoming events this week still causing stress and anxiety (graduation and wedding). There is unresolved conflict about which family members are invited to these events. Does not want to attend daughters wedding but will likely regret no doing so.    Restarted on fluoxetine (20 mg) daily. Also taking hydroxyzine (25 mg) at bed time. Has not needed to take lorazepam. Patient took work off today. Counseling still scheduled for 6/7/2017. Denies thoughts of harming self and does not have a plan. Receiving support from friends and coworkers.    PHQ-9: 16  PARKER-7: 19    PHQ-9 SCORE 2/6/2017 4/5/2017 6/2/2017   Total Score - - -   Total Score 13 10 17     PARKER-7 SCORE 2/6/2017 4/5/2017 6/2/2017   Total Score - - -   Total Score 12 17 19          Amount of exercise or physical activity: gardening    Problems taking medications regularly: No    Medication side effects: none    Diet: regular (no restrictions)    Problem list and histories reviewed & adjusted, as indicated.  Additional history: as documented    Patient Active Problem List   Diagnosis     Hypertension goal BP (blood pressure) < 140/90     Recurrent cold sores     S/P hysterectomy     CARDIOVASCULAR SCREENING; LDL GOAL LESS THAN 160     Anxiety     Vitamin D deficiency     Past Surgical History:   Procedure Laterality Date     ADENOIDECTOMY       CYSTOSCOPY  2007     HYSTERECTOMY, PAP NO LONGER INDICATED  2010    BSO,  cervix removed, for dysmenorrhea     TUBAL LIGATION  1999      BREAST BIOPSY LT  1/2013    left, benign       Social History   Substance Use Topics     Smoking status: Never Smoker     Smokeless tobacco: Never Used     Alcohol use No     Family History   Problem Relation Age of Onset     HEART DISEASE Mother      Unknown/Adopted No family hx of          Current Outpatient Prescriptions   Medication Sig Dispense Refill     LORazepam (ATIVAN) 0.5 MG tablet Take 1 tablet (0.5 mg) by mouth every 8 hours as needed for anxiety 2 tablet 0     hydrOXYzine (ATARAX) 25 MG tablet Take 1-2 tablets (25-50 mg) by mouth every 6 hours as needed for anxiety 60 tablet 1     cholecalciferol (VITAMIN D3) 85166 UNITS capsule Take 1 capsule (50,000 Units) by mouth once a week 12 capsule 0     FLUoxetine (PROZAC) 20 MG capsule Take 1 capsule (20 mg) by mouth daily 30 capsule 11     tacrolimus (PROTOPIC) 0.1 % ointment Apply topically 2 times daily 60 g 0     amLODIPine-benazepril (LOTREL) 10-40 MG per capsule Take 1 capsule by mouth daily 90 capsule 3     valACYclovir (VALTREX) 1000 mg tablet Take 1 tablet (1,000 mg) by mouth daily 90 tablet 1     Allergies   Allergen Reactions     Hydrochlorothiazide Nausea and Diarrhea     Metronidazole      Wellbutrin [Bupropion]      Tingling sensation       Morphine Rash     Reviewed and updated as needed this visit by clinical staff  Tobacco  Allergies  Med Hx  Surg Hx  Fam Hx  Soc Hx      Reviewed and updated as needed this visit by Provider    ROS:  Constitutional, HEENT, cardiovascular, pulmonary, GI, , musculoskeletal, neuro, skin, endocrine and psych systems are negative, except as otherwise noted.    This document serves as a record of the services and decisions personally performed and made by Susan Haase, CNP. It was created on her behalf by Domenica Mendes, a trained medical scribe. The creation of this document is based the provider's statements to the medical scribe.  Domenica  Vaughn June 5, 2017 9:14 AM   OBJECTIVE:                                                    /78 (BP Location: Left arm, Patient Position: Chair, Cuff Size: Adult Regular)  Pulse 86  Temp 98  F (36.7  C) (Oral)  Resp 12  Wt 90.3 kg (199 lb)  SpO2 100%  BMI 31.17 kg/m2  Body mass index is 31.17 kg/(m^2).  GENERAL: healthy, alert and no distress  PSYCH: mentation appears normal, affect tearful    Diagnostic Test Results:  none      ASSESSMENT/PLAN:                                                      Lawanda was seen today for recheck.    Diagnoses and all orders for this visit:    Anxiety: continue to take paroxetine every day, atarax prn.  Counseling scheduled for 6/7/2017. Will be off of work through 6/12/2017      Follow up visit in 2-4 weeks, sooner as needed.    The information in this document, created by the medical scribe for me, accurately reflects the services I personally performed and the decisions made by me. I have reviewed and approved this document for accuracy.   Susan Haase, APRN [facility]

## 2017-06-05 NOTE — NURSING NOTE
"Chief Complaint   Patient presents with     RECHECK       Initial /78 (BP Location: Left arm, Patient Position: Chair, Cuff Size: Adult Regular)  Pulse 86  Temp 98  F (36.7  C) (Oral)  Resp 12  Wt 199 lb (90.3 kg)  SpO2 100%  BMI 31.17 kg/m2 Estimated body mass index is 31.17 kg/(m^2) as calculated from the following:    Height as of 1/22/17: 5' 7\" (1.702 m).    Weight as of this encounter: 199 lb (90.3 kg).  Medication Reconciliation: complete   Francesca Boykin CMA      "

## 2017-06-06 ASSESSMENT — PATIENT HEALTH QUESTIONNAIRE - PHQ9: SUM OF ALL RESPONSES TO PHQ QUESTIONS 1-9: 16

## 2017-06-06 ASSESSMENT — ANXIETY QUESTIONNAIRES: GAD7 TOTAL SCORE: 19

## 2017-06-08 ENCOUNTER — TELEPHONE (OUTPATIENT)
Dept: FAMILY MEDICINE | Facility: CLINIC | Age: 47
End: 2017-06-08

## 2017-06-14 ENCOUNTER — OFFICE VISIT (OUTPATIENT)
Dept: FAMILY MEDICINE | Facility: CLINIC | Age: 47
End: 2017-06-14
Payer: COMMERCIAL

## 2017-06-14 VITALS
TEMPERATURE: 97.8 F | DIASTOLIC BLOOD PRESSURE: 82 MMHG | OXYGEN SATURATION: 99 % | BODY MASS INDEX: 29.47 KG/M2 | SYSTOLIC BLOOD PRESSURE: 118 MMHG | HEIGHT: 69 IN | WEIGHT: 199 LBS | HEART RATE: 78 BPM

## 2017-06-14 DIAGNOSIS — F41.9 ANXIETY: Primary | ICD-10-CM

## 2017-06-14 PROCEDURE — 99213 OFFICE O/P EST LOW 20 MIN: CPT | Performed by: FAMILY MEDICINE

## 2017-06-14 NOTE — NURSING NOTE
"Chief Complaint   Patient presents with     Forms     fmla       Initial /82 (BP Location: Left arm, Patient Position: Chair, Cuff Size: Adult Large)  Pulse 78  Temp 97.8  F (36.6  C) (Oral)  Ht 5' 9\" (1.753 m)  Wt 199 lb (90.3 kg)  SpO2 99%  BMI 29.39 kg/m2 Estimated body mass index is 29.39 kg/(m^2) as calculated from the following:    Height as of this encounter: 5' 9\" (1.753 m).    Weight as of this encounter: 199 lb (90.3 kg).  Medication Reconciliation: complete   "

## 2017-06-14 NOTE — MR AVS SNAPSHOT
"              After Visit Summary   6/14/2017    Lawanda Katz    MRN: [id number]           Patient Information     Date Of Birth          1970        Visit Information        Provider Department      6/14/2017 9:00 AM Ruperto Rosales MD [facility]        Today's Diagnoses     Anxiety    -  1       Follow-ups after your visit        Who to contact     If you have questions or need follow up information about today's clinic visit or your schedule please contact [facility] directly at [phone].  Normal or non-critical lab and imaging results will be communicated to you by MyChart, letter or phone within 4 business days after the clinic has received the results. If you do not hear from us within 7 days, please contact the clinic through [employer] or phone. If you have a critical or abnormal lab result, we will notify you by phone as soon as possible.  Submit refill requests through [employer] or call your pharmacy and they will forward the refill request to us. Please allow 3 business days for your refill to be completed.          Additional Information About Your Visit        MyChart Information     [employer] gives you secure access to your electronic health record. If you see a primary care provider, you can also send messages to your care team and make appointments. If you have questions, please call your primary care clinic.  If you do not have a primary care provider, please call [phone] and they will assist you.        Care EveryWhere ID     This is your Care EveryWhere ID. This could be used by other organizations to access your [place] medical records  [id number]        Your Vitals Were     Pulse Temperature Height Pulse Oximetry BMI (Body Mass Index)       78 97.8  F (36.6  C) (Oral) 5' 9\" (1.753 m) 99% 29.39 kg/m2        Blood Pressure from Last 3 Encounters:   06/14/17 118/82   06/05/17 118/78   06/02/17 124/80    Weight from Last 3 Encounters: "   06/14/17 199 lb (90.3 kg)   06/05/17 199 lb (90.3 kg)   06/02/17 196 lb (88.9 kg)              Today, you had the following     No orders found for display       Primary Care Provider Office Phone # Fax #    Susan Rachele Haase, APRN [phone]       [address] [address]        Thank you!     Thank you for choosing [facility]  for your care. Our goal is always to provide you with excellent care. Hearing back from our patients is one way we can continue to improve our services. Please take a few minutes to complete the written survey that you may receive in the mail after your visit with us. Thank you!             Your Updated Medication List - Protect others around you: Learn how to safely use, store and throw away your medicines at www.disposemymeds.org.          This list is accurate as of: 6/14/17 10:13 PM.  Always use your most recent med list.                   Brand Name Dispense Instructions for use    amLODIPine-benazepril 10-40 MG per capsule    LOTREL    90 capsule    Take 1 capsule by mouth daily       cholecalciferol 48785 UNITS capsule    VITAMIN D3    12 capsule    Take 1 capsule (50,000 Units) by mouth once a week       FLUoxetine 20 MG capsule    PROzac    30 capsule    Take 1 capsule (20 mg) by mouth daily       hydrOXYzine 25 MG tablet    ATARAX    60 tablet    Take 1-2 tablets (25-50 mg) by mouth every 6 hours as needed for anxiety       LORazepam 0.5 MG tablet    ATIVAN    2 tablet    Take 1 tablet (0.5 mg) by mouth every 8 hours as needed for anxiety       tacrolimus 0.1 % ointment    PROTOPIC    60 g    Apply topically 2 times daily       valACYclovir 1000 mg tablet    VALTREX    90 tablet    Take 1 tablet (1,000 mg) by mouth daily

## 2017-06-14 NOTE — PROGRESS NOTES
SUBJECTIVE:                                                    Lawanda Katz is a 47 year old female who presents to clinic today for the following health issues:      Letter to return to work     Inventory of mental status shows issues with anxiety and grief after extended family discord  Current Outpatient Prescriptions   Medication     LORazepam (ATIVAN) 0.5 MG tablet     hydrOXYzine (ATARAX) 25 MG tablet     cholecalciferol (VITAMIN D3) 50143 UNITS capsule     FLUoxetine (PROZAC) 20 MG capsule     tacrolimus (PROTOPIC) 0.1 % ointment     amLODIPine-benazepril (LOTREL) 10-40 MG per capsule     valACYclovir (VALTREX) 1000 mg tablet     No current facility-administered medications for this visit.      .    Discussed regarding anxiety, sleep, anhedonia, irritability, energy,  perspective, self image, affect, current stressors, holistic parameters, work situation,  physical and social mitigating factors.    Past history has included past episodes and positive  family history.    Prior treatment has included ssri and talk therapy is ongoing  .    Suicidal ideation is  absent.    No problems with vision, hearing, thyroid, chest pain, dyspnea,rash,  stomach upset, polyuria, polydipsia, dysuria,muscle aches, numbness,  cough, tics or balance issues. Memory is reliable .    LAYNE,thyroid normal, lungs clear, s1s2,soft abdoman, symmetrical dtrs,  no abdominal mass,good peripheral pulses, vs stable.  Discussed ssri vs dop/ser grouping vs wellbutrin in depression mgmt  Discussed anxiolytics as situational tools not specific therapy  Discussed work status and rtw effective today     Follow up PCP in two weeks  (F41.9) Anxiety  (primary encounter diagnosis)  Comment:   Plan:

## 2017-08-03 DIAGNOSIS — F41.9 ANXIETY: ICD-10-CM

## 2017-08-03 RX ORDER — HYDROXYZINE HYDROCHLORIDE 25 MG/1
25-50 TABLET, FILM COATED ORAL EVERY 6 HOURS PRN
Qty: 60 TABLET | Refills: 1 | Status: SHIPPED | OUTPATIENT
Start: 2017-08-03 | End: 2018-02-16

## 2017-08-03 RX ORDER — LORAZEPAM 0.5 MG/1
0.5 TABLET ORAL EVERY 8 HOURS PRN
Qty: 2 TABLET | Refills: 0 | Status: CANCELLED | OUTPATIENT
Start: 2017-08-03

## 2017-08-24 ENCOUNTER — OFFICE VISIT (OUTPATIENT)
Dept: FAMILY MEDICINE | Facility: CLINIC | Age: 47
End: 2017-08-24
Payer: COMMERCIAL

## 2017-08-24 VITALS
SYSTOLIC BLOOD PRESSURE: 126 MMHG | HEIGHT: 66 IN | HEART RATE: 78 BPM | RESPIRATION RATE: 16 BRPM | BODY MASS INDEX: 31.82 KG/M2 | DIASTOLIC BLOOD PRESSURE: 86 MMHG | WEIGHT: 198 LBS | TEMPERATURE: 98.1 F

## 2017-08-24 DIAGNOSIS — F63.0 PATHOLOGICAL GAMBLING: Primary | ICD-10-CM

## 2017-08-24 PROCEDURE — 99214 OFFICE O/P EST MOD 30 MIN: CPT | Performed by: FAMILY MEDICINE

## 2017-08-24 RX ORDER — NALTREXONE HYDROCHLORIDE 50 MG/1
50 TABLET, FILM COATED ORAL DAILY
Qty: 30 TABLET | Refills: 0 | Status: SHIPPED | OUTPATIENT
Start: 2017-08-24 | End: 2021-07-05

## 2017-08-24 NOTE — MR AVS SNAPSHOT
After Visit Summary   8/24/2017    Lawanda Katz    MRN: [id number]           Patient Information     Date Of Birth          1970        Visit Information        Provider Department      8/24/2017 11:30 AM Dedra Becerra MD [facility]        Today's Diagnoses     Pathological gambling    -  1      Care Instructions    Follow up in 3-4 weeks or sooner if needed    Signs of Addiction to Gambling  The more you regularly rely on something external to lift you up, relax you, or help you feel better, the closer you move toward addiction. If you decide you are on the path to addiction, you can take action to change your behavior and find caring people to help you.  Check your addiction level  Maybe you think gambling is a quick thrill, an adventure. You think it's a way to be a winner. Sure, you may win. But more likely, excessive gambling can cause you to lose all you've worked for. Read the following statements and check yes or no. Answering yes to 3 or more questions may be a signal that gambling is taking over your life.   If you are concerned about your gambling habits, contact Gamblers Anonymous at www.gamblersanonymous.org. Or call the Gamblers Anonymous International Service Office at  [phone].  Yes No     ? ? When you mckee, do you think you re just making an investment in your future?   ? ? Do you feel stimulated and alive while gambling?   ? ? Do you think that if you keep gambling, you ll get irasema?   ? ? Do you continue to mckee even though you lose more than you win?   ? ? Have you ever sold any of your possessions to pay off gambling debts?   ? ? Have you borrowed money from friends to pay off gambling debts?   ? ? Do you try to hide your gambling from family members or friends?   ? ? Do you find it hard to sleep because you re in debt?   ? ? Is the financial safety of your family threatened because your gambling has put you into debt?   ? ? Have  you been feeling more anxious and worried about your gambling?      Date Last Reviewed: 5/1/2017 2000-2017 [employer]. [address] [place] [address], [place] [address]. All rights reserved. This information is not intended as a substitute for professional medical care. Always follow your healthcare professional's instructions.        Naltrexone tablets  What is this medicine?  NALTREXONE (nal TREX one) helps you to remain free of your dependence on opiate drugs or alcohol. It blocks the 'high' that these substances can give you. This medicine is combined with counseling and support groups.  How should I use this medicine?  Take this medicine by mouth with a full glass of water. Follow the directions on the prescription label. Do not take this medicine within 7 to 10 days of taking any opioid drugs. Take your medicine at regular intervals. Do not take your medicine more often than directed. Do not stop taking except on your doctor's advice.  Talk to your pediatrician regarding the use of this medicine in children. Special care may be needed.  What side effects may I notice from receiving this medicine?  Side effects that you should report to your doctor or health care professional as soon as possible:    allergic reactions like skin rash, itching or hives, swelling of the face, lips, or tongue    breathing problems    changes in vision, hearing    confusion    dark urine    depressed mood    diarrhea    fast or irregular heart beat    hallucination, loss of contact with reality    light-colored stools    right upper belly pain    suicidal thoughts or other mood changes    unusually weak or tired    vomiting    yellowing of the eyes or skin  Side effects that usually do not require medical attention (report to your doctor or health care professional if they continue or are bothersome):    aches, pains    change in sex drive or performance    feeling anxious    headache    loss of appetite, nausea    runny  nose, sinus problems, sneezing    stomach pain    trouble sleeping  What may interact with this medicine?  Do not take this medicine with any of the following medications:    any prescription or street opioid drug like codiene, heroin, methadone  This medicine may also interact with the following medications:    disulfiram    thioridazine  What if I miss a dose?  If you miss a dose and remember on the same day, take the missed dose. If you do not remember until the next day, ask your doctor or health care professional about rescheduling your doses. Do not take double or extra doses.  Where should I keep my medicine?  Keep out of the reach of children.  Store at room temperature between 20 and 25 degrees C (68 and 77 degrees F). Throw away any unused medicine after the expiration date.  What should I tell my health care provider before I take this medicine?  They need to know if you have any of these conditions:    if you have used drugs or alcohol within 7 to 10 days    kidney disease    liver disease, including hepatitis    an unusual or allergic reaction to naltrexone, other medicines, foods, dyes, or preservatives    pregnant or trying to get pregnant    breast-feeding  What should I watch for while using this medicine?  Your condition will be monitored carefully while you are receiving this medicine. Visit your doctor or health care professional regularly. For this medicine to be most effective you should attend any counseling or support groups that your doctor or health care professional recommends. Do not try to overcome the effects of the medicine by taking large amounts of narcotics or by drinking large amounts of alcohol. This can cause severe problems including death. Also, you may be more sensitive to lower doses of narcotics after you stop taking this medicine.  If you are going to have surgery, tell your doctor or health care professional that you are taking this medicine.  Do not treat yourself for  coughs, colds, pain, or diarrhea. Ask your doctor or health care professional for advice. Some of the ingredients may interact with this medicine and cause side effects.  Wear a medical ID bracelet or chain, and carry a card that describes your disease and details of your medicine and dosage times.  You may get drowsy or dizzy. Do not drive, use machinery, or do anything that needs mental alertness until you know how this medicine affects you. Do not stand or sit up quickly, especially if you are an older patient. This reduces the risk of dizzy or fainting spells. Alcohol may interfere with the effect of this medicine. Avoid alcoholic drinks.  NOTE:This sheet is a summary. It may not cover all possible information. If you have questions about this medicine, talk to your doctor, pharmacist, or health care provider. Copyright  2017 [employer]                Follow-ups after your visit        Future tests that were ordered for you today     Open Future Orders        Priority Expected Expires Ordered    Comprehensive metabolic panel Routine  8/24/2018 8/24/2017            Who to contact     If you have questions or need follow up information about today's clinic visit or your schedule please contact [facility] directly at [phone].  Normal or non-critical lab and imaging results will be communicated to you by Vee24hart, letter or phone within 4 business days after the clinic has received the results. If you do not hear from us within 7 days, please contact the clinic through Vee24hart or phone. If you have a critical or abnormal lab result, we will notify you by phone as soon as possible.  Submit refill requests through [employer] or call your pharmacy and they will forward the refill request to us. Please allow 3 business days for your refill to be completed.          Additional Information About Your Visit        Vee24harCalpian Information     [employer] gives you secure access to your electronic health  record. If you see a primary care provider, you can also send messages to your care team and make appointments. If you have questions, please call your primary care clinic.  If you do not have a primary care provider, please call [phone] and they will assist you.        Care EveryWhere ID     This is your Care EveryWhere ID. This could be used by other organizations to access your [facility] medical records  [id number]         Blood Pressure from Last 3 Encounters:   06/14/17 118/82   06/05/17 118/78   06/02/17 124/80    Weight from Last 3 Encounters:   06/14/17 199 lb (90.3 kg)   06/05/17 199 lb (90.3 kg)   06/02/17 196 lb (88.9 kg)                 Today's Medication Changes          These changes are accurate as of: 8/24/17 11:57 AM.  If you have any questions, ask your nurse or doctor.               Start taking these medicines.        Dose/Directions    naltrexone 50 MG tablet   Commonly known as:  DEPADE;REVIA   Used for:  Pathological gambling        Dose:  50 mg   Take 1 tablet (50 mg) by mouth daily   Quantity:  30 tablet   Refills:  0            Where to get your medicines      These medications were sent to [facility] [address]  [id number] [place], [place] [id number]     Phone:  [phone]     naltrexone 50 MG tablet                Primary Care Provider Office Phone # Fax #    Susan Rachele Haase, APRN -[phone] [address] [facility] [address]        Equal Access to Services     RAQUEL SANCHEZ AH: Hadii aad ku hadasho Soomaali, waaxda luqadaha, qaybta kaalmada adeegyada, dione waters. So [facility] [phone].    ATENCIÓN: Si habla stuart, tiene a tolbert disposición servicios gratuitos de asistencia lingüística. Llame al [phone].    We comply with applicable federal civil rights laws and [place] laws. We do not discriminate on the basis of race, color, national origin, age, disability sex, sexual  orientation or gender identity.            Thank you!     Thank you for choosing [facility]  for your care. Our goal is always to provide you with excellent care. Hearing back from our patients is one way we can continue to improve our services. Please take a few minutes to complete the written survey that you may receive in the mail after your visit with us. Thank you!             Your Updated Medication List - Protect others around you: Learn how to safely use, store and throw away your medicines at www.disposemymeds.org.          This list is accurate as of: 8/24/17 11:57 AM.  Always use your most recent med list.                   Brand Name Dispense Instructions for use Diagnosis    amLODIPine-benazepril 10-40 MG per capsule    LOTREL    90 capsule    Take 1 capsule by mouth daily    Hypertension goal BP (blood pressure) < 140/90       cholecalciferol 30640 UNITS capsule    VITAMIN D3    12 capsule    Take 1 capsule (50,000 Units) by mouth once a week    Vitamin D deficiency       FLUoxetine 20 MG capsule    PROzac    30 capsule    Take 1 capsule (20 mg) by mouth daily    Anxiety       hydrOXYzine 25 MG tablet    ATARAX    60 tablet    Take 1-2 tablets (25-50 mg) by mouth every 6 hours as needed for anxiety    Anxiety       LORazepam 0.5 MG tablet    ATIVAN    2 tablet    Take 1 tablet (0.5 mg) by mouth every 8 hours as needed for anxiety    Anxiety       naltrexone 50 MG tablet    DEPADE;REVIA    30 tablet    Take 1 tablet (50 mg) by mouth daily    Pathological gambling       tacrolimus 0.1 % ointment    PROTOPIC    60 g    Apply topically 2 times daily        valACYclovir 1000 mg tablet    VALTREX    90 tablet    Take 1 tablet (1,000 mg) by mouth daily    Recurrent cold sores

## 2017-08-24 NOTE — PATIENT INSTRUCTIONS
Follow up in 3-4 weeks or sooner if needed    Signs of Addiction to Gambling  The more you regularly rely on something external to lift you up, relax you, or help you feel better, the closer you move toward addiction. If you decide you are on the path to addiction, you can take action to change your behavior and find caring people to help you.  Check your addiction level  Maybe you think gambling is a quick thrill, an adventure. You think it's a way to be a winner. Sure, you may win. But more likely, excessive gambling can cause you to lose all you've worked for. Read the following statements and check yes or no. Answering yes to 3 or more questions may be a signal that gambling is taking over your life.   If you are concerned about your gambling habits, contact Gamblers Anonymous at www.In[employer] Opinionus.org. Or call the [employer] Service Office at  [phone].  Yes No     ? ? When you mckee, do you think you re just making an investment in your future?   ? ? Do you feel stimulated and alive while gambling?   ? ? Do you think that if you keep gambling, you ll get irasema?   ? ? Do you continue to mckee even though you lose more than you win?   ? ? Have you ever sold any of your possessions to pay off gambling debts?   ? ? Have you borrowed money from friends to pay off gambling debts?   ? ? Do you try to hide your gambling from family members or friends?   ? ? Do you find it hard to sleep because you re in debt?   ? ? Is the financial safety of your family threatened because your gambling has put you into debt?   ? ? Have you been feeling more anxious and worried about your gambling?      Date Last Reviewed: 5/1/2017 2000-2017 [employer]. [address] [place] [address], [place] [address]. All rights reserved. This information is not intended as a substitute for professional medical care. Always follow your healthcare professional's instructions.        Naltrexone tablets  What is  this medicine?  NALTREXONE (nal TREX one) helps you to remain free of your dependence on opiate drugs or alcohol. It blocks the 'high' that these substances can give you. This medicine is combined with counseling and support groups.  How should I use this medicine?  Take this medicine by mouth with a full glass of water. Follow the directions on the prescription label. Do not take this medicine within 7 to 10 days of taking any opioid drugs. Take your medicine at regular intervals. Do not take your medicine more often than directed. Do not stop taking except on your doctor's advice.  Talk to your pediatrician regarding the use of this medicine in children. Special care may be needed.  What side effects may I notice from receiving this medicine?  Side effects that you should report to your doctor or health care professional as soon as possible:    allergic reactions like skin rash, itching or hives, swelling of the face, lips, or tongue    breathing problems    changes in vision, hearing    confusion    dark urine    depressed mood    diarrhea    fast or irregular heart beat    hallucination, loss of contact with reality    light-colored stools    right upper belly pain    suicidal thoughts or other mood changes    unusually weak or tired    vomiting    yellowing of the eyes or skin  Side effects that usually do not require medical attention (report to your doctor or health care professional if they continue or are bothersome):    aches, pains    change in sex drive or performance    feeling anxious    headache    loss of appetite, nausea    runny nose, sinus problems, sneezing    stomach pain    trouble sleeping  What may interact with this medicine?  Do not take this medicine with any of the following medications:    any prescription or street opioid drug like codiene, heroin, methadone  This medicine may also interact with the following medications:    disulfiram    thioridazine  What if I miss a dose?  If you  miss a dose and remember on the same day, take the missed dose. If you do not remember until the next day, ask your doctor or health care professional about rescheduling your doses. Do not take double or extra doses.  Where should I keep my medicine?  Keep out of the reach of children.  Store at room temperature between 20 and 25 degrees C (68 and 77 degrees F). Throw away any unused medicine after the expiration date.  What should I tell my health care provider before I take this medicine?  They need to know if you have any of these conditions:    if you have used drugs or alcohol within 7 to 10 days    kidney disease    liver disease, including hepatitis    an unusual or allergic reaction to naltrexone, other medicines, foods, dyes, or preservatives    pregnant or trying to get pregnant    breast-feeding  What should I watch for while using this medicine?  Your condition will be monitored carefully while you are receiving this medicine. Visit your doctor or health care professional regularly. For this medicine to be most effective you should attend any counseling or support groups that your doctor or health care professional recommends. Do not try to overcome the effects of the medicine by taking large amounts of narcotics or by drinking large amounts of alcohol. This can cause severe problems including death. Also, you may be more sensitive to lower doses of narcotics after you stop taking this medicine.  If you are going to have surgery, tell your doctor or health care professional that you are taking this medicine.  Do not treat yourself for coughs, colds, pain, or diarrhea. Ask your doctor or health care professional for advice. Some of the ingredients may interact with this medicine and cause side effects.  Wear a medical ID bracelet or chain, and carry a card that describes your disease and details of your medicine and dosage times.  You may get drowsy or dizzy. Do not drive, use machinery, or do anything  that needs mental alertness until you know how this medicine affects you. Do not stand or sit up quickly, especially if you are an older patient. This reduces the risk of dizzy or fainting spells. Alcohol may interfere with the effect of this medicine. Avoid alcoholic drinks.  NOTE:This sheet is a summary. It may not cover all possible information. If you have questions about this medicine, talk to your doctor, pharmacist, or health care provider. Copyright  2017 [employer]

## 2017-08-24 NOTE — PROGRESS NOTES
"  SUBJECTIVE:   Lawanda Katz is a 47 year old female who presents to clinic today for the following health issues:    Has been gambling every other day since April 2017 . Started gambling about a year and half ago after going to a casino for a show. At that time, she played with $40 and won $900 back. This was so exciting for her it became something to do for leisure. Of late gambling is now consuming her everyday thoughts.   Recently enrolled in a Gamblers group (club recovery) about a month ago. She still struggles with the urges of gambling everyday. Had a recent relapse which she is not proud of. States \" I have everything to loose so I need something to help me\".           Problem list and histories reviewed & adjusted, as indicated.  Additional history: as documented    Patient Active Problem List   Diagnosis     Hypertension goal BP (blood pressure) < 140/90     Recurrent cold sores     S/P hysterectomy     CARDIOVASCULAR SCREENING; LDL GOAL LESS THAN 160     Anxiety     Vitamin D deficiency     Pathological gambling     Past Surgical History:   Procedure Laterality Date     ADENOIDECTOMY       CYSTOSCOPY  2007     HYSTERECTOMY, PAP NO LONGER INDICATED  2010    BSO, cervix removed, for dysmenorrhea     TUBAL LIGATION  1999     US BREAST BIOPSY LT  1/2013    left, benign       Social History   Substance Use Topics     Smoking status: Never Smoker     Smokeless tobacco: Never Used     Alcohol use No     Family History   Problem Relation Age of Onset     HEART DISEASE Mother      Unknown/Adopted No family hx of              Reviewed and updated as needed this visit by clinical staff  Tobacco  Allergies       Reviewed and updated as needed this visit by Provider       ROS:  Constitutional, psych  systems are negative, except as otherwise noted.      OBJECTIVE:   /86 (BP Location: Right arm, Patient Position: Chair, Cuff Size: Adult Large)  Pulse 78  Temp 98.1  F (36.7  C) (Oral)  Resp 16  Ht 5' 6\" " (1.676 m)  Wt 198 lb (89.8 kg)  Breastfeeding? No  BMI 31.96 kg/m2  Body mass index is 31.96 kg/(m^2).  GENERAL: healthy, alert and no distress  PSYCH: mentation appears normal, anxious and judgement and insight intact    Diagnostic Test Results:  none     ASSESSMENT/PLAN:       1. Pathological gambling  - there have been some studies showing good results with naltrexone. will start on naltrexone at low dose for now and titrate up as tolerated. Side effects including hepatotoxicity discussed with patient. Will check LFTs at next visit.   - Comprehensive metabolic panel; Future  - naltrexone (DEPADE;REVIA) 50 MG tablet; Take 1 tablet (50 mg) by mouth daily  Dispense: 30 tablet; Refill: 0    See Patient Instructions    Dedra Becerra MD  [facility]

## 2017-08-24 NOTE — NURSING NOTE
"Chief Complaint   Patient presents with     Medication Request     requesting medication Naltrexone       Initial /86 (BP Location: Right arm, Patient Position: Chair, Cuff Size: Adult Large)  Pulse 78  Temp 98.1  F (36.7  C) (Oral)  Resp 16  Ht 5' 6\" (1.676 m)  Wt 198 lb (89.8 kg)  Breastfeeding? No  BMI 31.96 kg/m2 Estimated body mass index is 31.96 kg/(m^2) as calculated from the following:    Height as of this encounter: 5' 6\" (1.676 m).    Weight as of this encounter: 198 lb (89.8 kg).  Medication Reconciliation: complete     Raina Hogan/[facility]---[facility]      "

## 2017-08-24 NOTE — NURSING NOTE
"Chief Complaint   Patient presents with     Medication Request     requesting medication Naltrexone       Initial Breastfeeding? No Estimated body mass index is 29.39 kg/(m^2) as calculated from the following:    Height as of 6/14/17: 5' 9\" (1.753 m).    Weight as of 6/14/17: 199 lb (90.3 kg).  Medication Reconciliation: complete     Raina [facility]---[facility]      "

## 2017-09-19 ENCOUNTER — ALLIED HEALTH/NURSE VISIT (OUTPATIENT)
Dept: FAMILY MEDICINE | Facility: CLINIC | Age: 47
End: 2017-09-19
Payer: COMMERCIAL

## 2017-09-19 VITALS — DIASTOLIC BLOOD PRESSURE: 82 MMHG | SYSTOLIC BLOOD PRESSURE: 130 MMHG

## 2017-09-19 DIAGNOSIS — I10 HYPERTENSION GOAL BP (BLOOD PRESSURE) < 140/90: Primary | ICD-10-CM

## 2017-09-19 PROCEDURE — 99207 ZZC NO CHARGE NURSE ONLY: CPT | Performed by: NURSE PRACTITIONER

## 2017-09-19 NOTE — MR AVS SNAPSHOT
After Visit Summary   9/19/2017    Lawanda Katz    MRN: [id number]           Patient Information     Date Of Birth          1970        Visit Information        Provider Department      9/19/2017 11:02 AM Haase, Susan Rachele, APRN CNP [facility]        Today's Diagnoses     Hypertension goal BP (blood pressure) < 140/90    -  1       Follow-ups after your visit        Who to contact     If you have questions or need follow up information about today's clinic visit or your schedule please contact [facility] directly at [phone].  Normal or non-critical lab and imaging results will be communicated to you by Axentis Softwarehart, letter or phone within 4 business days after the clinic has received the results. If you do not hear from us within 7 days, please contact the clinic through Axentis Softwarehart or phone. If you have a critical or abnormal lab result, we will notify you by phone as soon as possible.  Submit refill requests through Senic or call your pharmacy and they will forward the refill request to us. Please allow 3 business days for your refill to be completed.          Additional Information About Your Visit        MyChart Information     Senic gives you secure access to your electronic health record. If you see a primary care provider, you can also send messages to your care team and make appointments. If you have questions, please call your primary care clinic.  If you do not have a primary care provider, please call [phone] and they will assist you.        Care EveryWhere ID     This is your Care EveryWhere ID. This could be used by other organizations to access your [place] medical records  [id number]         Blood Pressure from Last 3 Encounters:   09/19/17 130/82   08/24/17 126/86   06/14/17 118/82    Weight from Last 3 Encounters:   08/24/17 198 lb (89.8 kg)   06/14/17 199 lb (90.3 kg)   06/05/17 199 lb (90.3 kg)              Today, you had the  following     No orders found for display         Where to get your medicines      These medications were sent to [facility], [place] - [address]  [address] [facility] [address]     Phone:  [phone]     amLODIPine-benazepril 10-40 MG per capsule          Primary Care Provider Office Phone # Fax #    Susan Rachele Haase, APRN [phone] [address] [facility] [address]        Equal Access to Services     RAQUEL SANCHEZ : Hadii aad ku hadasho Soomaali, waaxda luqadaha, qaybta kaalmada adeegyada, waxay idiin hayaan adeeg kharablanca charles . So [facility] [place] [phone].    ATENCIÓN: Si habla español, tiene a tolbert disposición servicios gratuitos de asistencia lingüística. [facility] [phone].    We comply with applicable federal civil rights laws and [place] laws. We do not discriminate on the basis of race, color, national origin, age, disability sex, sexual orientation or gender identity.            Thank you!     Thank you for choosing [facility]  for your care. Our goal is always to provide you with excellent care. Hearing back from our patients is one way we can continue to improve our services. Please take a few minutes to complete the written survey that you may receive in the mail after your visit with us. Thank you!             Your Updated Medication List - Protect others around you: Learn how to safely use, store and throw away your medicines at www.disposemymeds.org.          This list is accurate as of: 9/19/17 11:59 PM.  Always use your most recent med list.                   Brand Name Dispense Instructions for use Diagnosis    amLODIPine-benazepril 10-40 MG per capsule    LOTREL    90 capsule    Take 1 capsule by mouth daily    Hypertension goal BP (blood pressure) < 140/90       FLUoxetine 20 MG capsule    PROzac    30 capsule    Take 1 capsule (20 mg) by mouth daily    Anxiety       hydrOXYzine 25 MG tablet    ATARAX     60 tablet    Take 1-2 tablets (25-50 mg) by mouth every 6 hours as needed for anxiety    Anxiety       naltrexone 50 MG tablet    DEPADE;REVIA    30 tablet    Take 1 tablet (50 mg) by mouth daily    Pathological gambling       tacrolimus 0.1 % ointment    PROTOPIC    60 g    Apply topically 2 times daily        valACYclovir 1000 mg tablet    VALTREX    90 tablet    Take 1 tablet (1,000 mg) by mouth daily    Recurrent cold sores

## 2017-09-19 NOTE — Clinical Note
Lawanda takes her Amlodipine every other day when she remembers. Wondering if you would want to change it to every other day to she is compliant since her BP is at goal. Mary

## 2017-09-19 NOTE — PROGRESS NOTES
Lawanda Katz is enrolled/participating in the retail pharmacy Blood Pressure Goals Achievement Program (BPGAP).  Lawanda Katz was evaluated at [facility] on September 19, 2017 at which time her blood pressure was:    BP Readings from Last 3 Encounters:   09/19/17 130/82   08/24/17 126/86   06/14/17 118/82     Reviewed lifestyle modifications for blood pressure control and reduction: including making healthy food choices, managing weight, getting regular exercise, smoking cessation, reducing alcohol consumption, monitoring blood pressure regularly.     Lawanda Katz is not experiencing symptoms.    Follow-Up: BP is at goal of < 140/90mmHg (patient 18+ years of age with or without diabetes).  Recommended follow-up at pharmacy in 6 months.     Recommendation to Provider: Patient is taking medication every other day when she remembers. Wondering if she can get a new script    Lawanda Katz was evaluated for enrollment into the PGEN study today.    Patient eligible for enrollment:  No  Patient interested in enrollment:  No    Completed by: Mary Green

## 2017-09-20 RX ORDER — AMLODIPINE AND BENAZEPRIL HYDROCHLORIDE 10; 40 MG/1; MG/1
1 CAPSULE ORAL DAILY
Qty: 90 CAPSULE | Refills: 3 | Status: SHIPPED | OUTPATIENT
Start: 2017-09-20 | End: 2018-02-16

## 2017-10-29 ENCOUNTER — OFFICE VISIT (OUTPATIENT)
Dept: URGENT CARE | Facility: URGENT CARE | Age: 47
End: 2017-10-29
Payer: COMMERCIAL

## 2017-10-29 VITALS
DIASTOLIC BLOOD PRESSURE: 76 MMHG | HEART RATE: 94 BPM | TEMPERATURE: 98.6 F | SYSTOLIC BLOOD PRESSURE: 120 MMHG | OXYGEN SATURATION: 97 %

## 2017-10-29 DIAGNOSIS — K64.4 EXTERNAL HEMORRHOIDS: Primary | ICD-10-CM

## 2017-10-29 PROCEDURE — 99213 OFFICE O/P EST LOW 20 MIN: CPT | Performed by: PHYSICIAN ASSISTANT

## 2017-10-29 RX ORDER — HYDROCORTISONE ACETATE 25 MG/1
25 SUPPOSITORY RECTAL 2 TIMES DAILY
Qty: 28 SUPPOSITORY | Refills: 0 | Status: SHIPPED | OUTPATIENT
Start: 2017-10-29 | End: 2017-11-12

## 2017-10-29 NOTE — LETTER
[facility]  [address]  [address]  [place] [address] [phone]          October 29, 2017    RE:  Lawanda Katz                                                                                                                                                       [address] [place] [address]            To whom it may concern:    Lawanda Katz was seen here today for evaluation of an acute medical problem.  Please excuse her from work tomorrow (10/30/17).         Sincerely,          Vivek Viramontes PA-C

## 2017-10-29 NOTE — NURSING NOTE
"Chief Complaint   Patient presents with     Urgent Care     Hemmorhoid x1 day. Patient has a hx of hemmoroids, but she has never had one as painful and large as this one. Patient is having a hard time having a BM.       Initial /76 (BP Location: Right arm, Patient Position: Sitting, Cuff Size: Adult Large)  Pulse 94  Temp 98.6  F (37  C)  SpO2 97% Estimated body mass index is 31.96 kg/(m^2) as calculated from the following:    Height as of 8/24/17: 5' 6\" (1.676 m).    Weight as of 8/24/17: 198 lb (89.8 kg).  Medication Reconciliation: complete   Holly Gil CMA (AAMA)            "

## 2017-10-29 NOTE — MR AVS SNAPSHOT
After Visit Summary   10/29/2017    Lawanda Katz    MRN: [id number]           Patient Information     Date Of Birth          1970        Visit Information        Provider Department      10/29/2017 6:15 PM Vivek Gardner PA-C [facility]        Today's Diagnoses     External hemorrhoids    -  1      Care Instructions      Hemorrhoids    Hemorrhoids are swollen and inflamed veins inside the rectum and near the anus. The rectum is the last several inches of the colon. The anus is the passage between the rectum and the outside of the body.  Causes  The veins can become swollen due to increased pressure in them. This is most often caused by:    Chronic constipation or diarrhea    Straining when having a bowel movement    Sitting too long on the toilet    A low-fiber diet    Pregnancy  Symptoms    Bleeding from the rectum (this may be noticeable after bowel movements)    Lump near the anus    Itching around the anus    Pain around the anus  There are different types of hemorrhoids. Depending on the type you have and the severity, you may be able to treat yourself at home. In some cases, a procedure may be the best treatment option. Your healthcare provider can tell you more about this, if needed.  Home care  General care    To get relief from pain or itching, try:    Topical products. Your healthcare provider may prescribe or recommend creams, ointments, or pads that can be applied to the hemorrhoid. Use these exactly as directed.    Medicines. Your healthcare provider may recommend stool softeners, suppositories, or laxatives to help manage constipation. Use these exactly as directed.    Sitz baths. A sitz bath involves sitting in a few inches of warm bath water. Be careful not to make the water so hot that you burn yourself--test it before sitting in it. Soak for about 10 to 15 minutes a few times a day. This may help relieve pain.  Tips to help prevent  hemorrhoids    Eat more fiber. Fiber adds bulk to stool and absorbs water as it moves through your colon. This makes stool softer and easier to pass.    Increase the fiber in your diet with more fiber-rich foods. These include fresh fruit, vegetables, and whole grains.    Take a fiber supplement or bulking agent, if advised to by your provider. These include products such as psyllium or methylcellulose.    Drink plenty of water, if directed to by your provider. This can help keep stool soft.    Be more active. Frequent exercise aids digestion and helps prevent constipation. It may also help make bowel movements more regular.    Don t strain during bowel movements. This can make hemorrhoids more likely. Also, don t sit on the toilet for long periods of time.  Follow-up care  Follow up with your healthcare provider, or as advised. If a culture or imaging tests were done, you will be notified of the results when they are ready. This may take a few days or longer.  When to seek medical advice  Call your healthcare provider right away if any of these occur:    Increased bleeding from the rectum    Increased pain around the rectum or anus    Weakness or dizziness  Call 911  Call 911 or return to the emergency department right away if any of these occur:    Trouble breathing or swallowing    Fainting or loss of consciousness    Unusually fast heart rate    Vomiting blood    Large amounts of blood in stool  Date Last Reviewed: 6/22/2015 2000-2017 [employer]. [address] [place], [place] [address]. All rights reserved. This information is not intended as a substitute for professional medical care. Always follow your healthcare professional's instructions.                Follow-ups after your visit        Additional Services     COLORECTAL SURGERY REFERRAL       Your provider has referred you to: [facility] [phone]   [email or web address]    Referral  Reason(s): Hemorrhoids  Special Concerns: None  This referral is: Urgent (24 - 72 hours)  It is OK to leave a message on patient's voicemail.    Please be aware that coverage of these services is subject to the terms and limitations of your health insurance plan.  Call member services at your health plan with any benefit or coverage questions.      Please bring the following with you to your appointment:    (1) Any X-Rays, CTs or MRIs which have been performed.  Contact the facility where they were done to arrange for  prior to your scheduled appointment.    (2) List of current medications  (3) This referral request   (4) Any documents/labs given to you for this referral                  Who to contact     If you have questions or need follow up information about today's clinic visit or your schedule please contact [facility] directly at [phone].  Normal or non-critical lab and imaging results will be communicated to you by MyChart, letter or phone within 4 business days after the clinic has received the results. If you do not hear from us within 7 days, please contact the clinic through Factory [employer]hart or phone. If you have a critical or abnormal lab result, we will notify you by phone as soon as possible.  Submit refill requests through [employer] or call your pharmacy and they will forward the refill request to us. Please allow 3 business days for your refill to be completed.          Additional Information About Your Visit        [employer]Ebook Glue Information     [employer] gives you secure access to your electronic health record. If you see a primary care provider, you can also send messages to your care team and make appointments. If you have questions, please call your primary care clinic.  If you do not have a primary care provider, please call [phone] and they will assist you.        Care EveryWhere ID     This is your Care EveryWhere ID. This could be used by other organizations to access your  [place] medical records  [id number]        Your Vitals Were     Pulse Temperature Pulse Oximetry             94 98.6  F (37  C) 97%          Blood Pressure from Last 3 Encounters:   10/29/17 120/76   09/19/17 130/82   08/24/17 126/86    Weight from Last 3 Encounters:   08/24/17 198 lb (89.8 kg)   06/14/17 199 lb (90.3 kg)   06/05/17 199 lb (90.3 kg)              We Performed the Following     COLORECTAL SURGERY REFERRAL          Today's Medication Changes          These changes are accurate as of: 10/29/17  7:09 PM.  If you have any questions, ask your nurse or doctor.               Start taking these medicines.        Dose/Directions    hydrocortisone 25 MG Suppository   Commonly known as:  ANUSOL-HC   Used for:  External hemorrhoids   Started by:  Vivek Gardner PA-C        Dose:  25 mg   Place 1 suppository (25 mg) rectally 2 times daily for 14 days   Quantity:  28 suppository   Refills:  0            Where to get your medicines      These medications were sent to [facility] [address] [place] [address] [address] AT [place] [address]  [id number] [place] [address]    Hours:  24-hours Phone:  [phone]     hydrocortisone 25 MG Suppository                Primary Care Provider Office Phone # Fax #    Marian Xiao Haase, APRN [phone] [address] [facility] [address]        Equal Access to Services     RAQUEL SANCHEZ AH: Hadii parish ku hadasho Soomaali, waaxda luqadaha, qaybta kaalmada adeegyada, waxay nahun charles . So [facility] [phone].    ATENCIÓN: Si habla español, tiene a tolbert disposición servicios gratuitos de asistencia lingüística. Llame al [phone].    We comply with applicable federal civil rights laws and [place] laws. We do not discriminate on the basis of race, color, national origin, age, disability, sex, sexual orientation, or gender identity.            Thank you!     Thank you for  [facility]  for your care. Our goal is always to provide you with excellent care. Hearing back from our patients is one way we can continue to improve our services. Please take a few minutes to complete the written survey that you may receive in the mail after your visit with us. Thank you!             Your Updated Medication List - Protect others around you: Learn how to safely use, store and throw away your medicines at www.disposemymeds.org.          This list is accurate as of: 10/29/17  7:09 PM.  Always use your most recent med list.                   Brand Name Dispense Instructions for use Diagnosis    amLODIPine-benazepril 10-40 MG per capsule    LOTREL    90 capsule    Take 1 capsule by mouth daily    Hypertension goal BP (blood pressure) < 140/90       FLUoxetine 20 MG capsule    PROzac    30 capsule    Take 1 capsule (20 mg) by mouth daily    Anxiety       hydrocortisone 25 MG Suppository    ANUSOL-HC    28 suppository    Place 1 suppository (25 mg) rectally 2 times daily for 14 days    External hemorrhoids       hydrOXYzine 25 MG tablet    ATARAX    60 tablet    Take 1-2 tablets (25-50 mg) by mouth every 6 hours as needed for anxiety    Anxiety       naltrexone 50 MG tablet    DEPADE;REVIA    30 tablet    Take 1 tablet (50 mg) by mouth daily    Pathological gambling       tacrolimus 0.1 % ointment    PROTOPIC    60 g    Apply topically 2 times daily        valACYclovir 1000 mg tablet    VALTREX    90 tablet    Take 1 tablet (1,000 mg) by mouth daily    Recurrent cold sores

## 2017-10-30 ENCOUNTER — OFFICE VISIT (OUTPATIENT)
Dept: FAMILY MEDICINE | Facility: CLINIC | Age: 47
End: 2017-10-30
Payer: COMMERCIAL

## 2017-10-30 VITALS
HEART RATE: 83 BPM | DIASTOLIC BLOOD PRESSURE: 70 MMHG | SYSTOLIC BLOOD PRESSURE: 110 MMHG | TEMPERATURE: 97.9 F | OXYGEN SATURATION: 98 %

## 2017-10-30 DIAGNOSIS — K64.5 THROMBOSED EXTERNAL HEMORRHOIDS: Primary | ICD-10-CM

## 2017-10-30 PROCEDURE — 46320 REMOVAL OF HEMORRHOID CLOT: CPT | Performed by: FAMILY MEDICINE

## 2017-10-30 NOTE — NURSING NOTE
"Chief Complaint   Patient presents with     Rectal Problem       Initial There were no vitals taken for this visit. Estimated body mass index is 31.96 kg/(m^2) as calculated from the following:    Height as of 8/24/17: 5' 6\" (1.676 m).    Weight as of 8/24/17: 198 lb (89.8 kg).  Medication Reconciliation: complete   Mamta Cai CMA       "

## 2017-10-30 NOTE — PROGRESS NOTES
SUBJECTIVE:  Lawanda Katz is a 47 year old female  who presents with chief complaint of anal tenderness/pain which began yesterday.  She complains of still present of pain which is severe.  Symptoms exacerbated by bowel movements.  Pain improved with nothing.  She denies any abdominal pain, nausea or vomiting or fevers.  No history of melena, profuse bleeding.  She does have a  history of hemorrhoids. Never needed to have thrombosed hemorrhoid drained. Denies any hx of ulysses-rectal abscess.     Past Medical History:   Diagnosis Date     Anxiety      Depression      Hypertension      Current Outpatient Prescriptions   Medication Sig Dispense Refill     hydrocortisone (ANUSOL-HC) 25 MG Suppository Place 1 suppository (25 mg) rectally 2 times daily for 14 days 28 suppository 0     amLODIPine-benazepril (LOTREL) 10-40 MG per capsule Take 1 capsule by mouth daily 90 capsule 3     naltrexone (DEPADE;REVIA) 50 MG tablet Take 1 tablet (50 mg) by mouth daily 30 tablet 0     hydrOXYzine (ATARAX) 25 MG tablet Take 1-2 tablets (25-50 mg) by mouth every 6 hours as needed for anxiety 60 tablet 1     FLUoxetine (PROZAC) 20 MG capsule Take 1 capsule (20 mg) by mouth daily 30 capsule 11     tacrolimus (PROTOPIC) 0.1 % ointment Apply topically 2 times daily 60 g 0     valACYclovir (VALTREX) 1000 mg tablet Take 1 tablet (1,000 mg) by mouth daily 90 tablet 1     Social History   Substance Use Topics     Smoking status: Never Smoker     Smokeless tobacco: Never Used     Alcohol use No           OBJECTIVE:  /76 (BP Location: Right arm, Patient Position: Sitting, Cuff Size: Adult Large)  Pulse 94  Temp 98.6  F (37  C)  SpO2 97%    General appearance: in no apparent distress.  ABDOMEN:  Soft, non-tender, non-distended.  Positive normal bowel sounds.  No HSM or masses.  No suprapubic tenderness.  No CVA tenderness.  Rectal exam: Positive tenderness. Positive external hemorrhoids noted. No thrombosed hemorrhoids. No fissures.  Patient unable to tolerate TRAE or anoscope today due to pain.     ASSESSMENT:    ICD-10-CM    1. External hemorrhoids K64.4 hydrocortisone (ANUSOL-HC) 25 MG Suppository     COLORECTAL SURGERY REFERRAL       PLAN:  Rx as ordered.  Recommend Sitz baths, ice pack to perineum, donut for sitting, Colace for next several days to one week, high fiber diet and f/u with primary physician or colorectal prn. Referral provided for colorectal consult.

## 2017-10-30 NOTE — MR AVS SNAPSHOT
After Visit Summary   10/30/2017    Lawanda Ktaz    MRN: [id number]           Patient Information     Date Of Birth          1970        Visit Information        Provider Department      10/30/2017 2:20 PM Janet Dey,  [facility]        Today's Diagnoses     Thrombosed external hemorrhoids    -  1       Follow-ups after your visit        Who to contact     If you have questions or need follow up information about today's clinic visit or your schedule please contact [facility] directly at [phone].  Normal or non-critical lab and imaging results will be communicated to you by Yatrahart, letter or phone within 4 business days after the clinic has received the results. If you do not hear from us within 7 days, please contact the clinic through [employer] or phone. If you have a critical or abnormal lab result, we will notify you by phone as soon as possible.  Submit refill requests through [email or web address] or call your pharmacy and they will forward the refill request to us. Please allow 3 business days for your refill to be completed.          Additional Information About Your Visit        MyChart Information     [employer] gives you secure access to your electronic health record. If you see a primary care provider, you can also send messages to your care team and make appointments. If you have questions, please call your primary care clinic.  If you do not have a primary care provider, please call [phone] and they will assist you.        Care EveryWhere ID     This is your Care EveryWhere ID. This could be used by other organizations to access your [place] medical records  [id number]        Your Vitals Were     Pulse Temperature Pulse Oximetry             83 97.9  F (36.6  C) (Oral) 98%          Blood Pressure from Last 3 Encounters:   10/30/17 110/70   10/29/17 120/76   09/19/17 130/82    Weight from Last 3 Encounters:   08/24/17 198 lb (89.8 kg)    06/14/17 199 lb (90.3 kg)   06/05/17 199 lb (90.3 kg)              We Performed the Following     EXCISION EXTERNAL THROMBOTIC HEMORRHOID        Primary Care Provider Office Phone # Fax #    Susan Rachele Haase, APRN [phone] [address] [facility] [address]        Equal Access to Services     [facility] DANIEL : Hadii aad ku hadasho Soomaali, waaxda luqadaha, qaybta kaalmada adeegyada, waxay idiin hayaan adeeg kharash laKevinaan . So [facility] [phone].    ATENCIÓN: Si habla español, tiene a tolbert disposición servicios gratuitos de asistencia lingüística. Llame al [phone].    We comply with applicable federal civil rights laws and [place] laws. We do not discriminate on the basis of race, color, national origin, age, disability, sex, sexual orientation, or gender identity.            Thank you!     Thank you for choosing [facility]  for your care. Our goal is always to provide you with excellent care. Hearing back from our patients is one way we can continue to improve our services. Please take a few minutes to complete the written survey that you may receive in the mail after your visit with us. Thank you!             Your Updated Medication List - Protect others around you: Learn how to safely use, store and throw away your medicines at www.disposemymeds.org.          This list is accurate as of: 10/30/17  2:53 PM.  Always use your most recent med list.                   Brand Name Dispense Instructions for use Diagnosis    amLODIPine-benazepril 10-40 MG per capsule    LOTREL    90 capsule    Take 1 capsule by mouth daily    Hypertension goal BP (blood pressure) < 140/90       FLUoxetine 20 MG capsule    PROzac    30 capsule    Take 1 capsule (20 mg) by mouth daily    Anxiety       hydrocortisone 25 MG Suppository    ANUSOL-HC    28 suppository    Place 1 suppository (25 mg) rectally 2 times daily for 14 days    External hemorrhoids       hydrOXYzine 25 MG tablet     ATARAX    60 tablet    Take 1-2 tablets (25-50 mg) by mouth every 6 hours as needed for anxiety    Anxiety       naltrexone 50 MG tablet    DEPADE;REVIA    30 tablet    Take 1 tablet (50 mg) by mouth daily    Pathological gambling       tacrolimus 0.1 % ointment    PROTOPIC    60 g    Apply topically 2 times daily        valACYclovir 1000 mg tablet    VALTREX    90 tablet    Take 1 tablet (1,000 mg) by mouth daily    Recurrent cold sores

## 2017-10-30 NOTE — PROGRESS NOTES
SUBJECTIVE:   Lawanda Katz is a 47 year old female who presents to clinic today for the following health issues:      Hemorrhoids       Duration: 2 days    Description:   Pain: YES and burns  Itching: no     Accompanying signs and symptoms:   Blood in stool: no   Changes in stool pattern: YES- constipated for the x 3 days    History (similar episodes/previous evaluation): has had in the past    Precipitating or alleviating factors: None    Therapies tried and outcome: NSAIDS, hydrocortisone suppisortory        Problem list and histories reviewed & adjusted, as indicated.  Additional history: as documented    Patient Active Problem List   Diagnosis     Hypertension goal BP (blood pressure) < 140/90     Recurrent cold sores     S/P hysterectomy     CARDIOVASCULAR SCREENING; LDL GOAL LESS THAN 160     Anxiety     Vitamin D deficiency     Pathological gambling     Past Surgical History:   Procedure Laterality Date     ADENOIDECTOMY       CYSTOSCOPY  2007     HYSTERECTOMY, PAP NO LONGER INDICATED  2010    BSO, cervix removed, for dysmenorrhea     TUBAL LIGATION  1999      BREAST BIOPSY LT  1/2013    left, benign       Social History   Substance Use Topics     Smoking status: Never Smoker     Smokeless tobacco: Never Used     Alcohol use No     Family History   Problem Relation Age of Onset     HEART DISEASE Mother      Unknown/Adopted No family hx of              Reviewed and updated as needed this visit by clinical staffTobacco       Reviewed and updated as needed this visit by Provider         ROS:  Constitutional, HEENT, cardiovascular, pulmonary, gi and gu systems are negative, except as otherwise noted.      OBJECTIVE:   /70 (BP Location: Right arm, Patient Position: Chair, Cuff Size: Adult Large)  Pulse 83  Temp 97.9  F (36.6  C) (Oral)  SpO2 98%  There is no height or weight on file to calculate BMI.  GENERAL: healthy, alert and no distress  RECTAL (female): Approx 1cm thrombosed external  hemorrhoid     PROCEDURE: Informed signed consent obtained.  Skin anesthetized with 1% lidocaine without epi.  Hemorrhoid lanced with an 11 blade and evacuated with gauze and light pressure.  Gauze dressing applied.  Patient tolerated procedure well.      ASSESSMENT/PLAN:     1. Thrombosed external hemorrhoids  - EXCISION EXTERNAL THROMBOTIC HEMORRHOID  - Instructions to keep anus very clean to avoid infection  - Take laxatives to keep stool very soft   - Sitz baths PRN     Follow up PRN     Janet Dey DO  [facility]

## 2017-12-03 ENCOUNTER — OFFICE VISIT (OUTPATIENT)
Dept: URGENT CARE | Facility: URGENT CARE | Age: 47
End: 2017-12-03
Payer: COMMERCIAL

## 2017-12-03 VITALS — TEMPERATURE: 97.9 F | OXYGEN SATURATION: 99 % | HEART RATE: 63 BPM | BODY MASS INDEX: 34.22 KG/M2 | WEIGHT: 212 LBS

## 2017-12-03 DIAGNOSIS — L72.3 SEBACEOUS CYST: Primary | ICD-10-CM

## 2017-12-03 PROCEDURE — 99213 OFFICE O/P EST LOW 20 MIN: CPT | Performed by: FAMILY MEDICINE

## 2017-12-03 NOTE — MR AVS SNAPSHOT
After Visit Summary   12/3/2017    Lawanda Katz    MRN: [id number]           Patient Information     Date Of Birth          1970        Visit Information        Provider Department      12/3/2017 2:00 PM Fatoumata Ness MD [facility]        Today's Diagnoses     Sebaceous cyst    -  1      Care Instructions      Epidermoid Cyst (Sebaceous Cyst), No Infection  An epidermoid cyst (sebaceous cyst) is a term that refers to 2 similar types of cysts: those found in the skin (epidermoid), and those found around hair follicles (pilar).  Some general facts about these cysts:    A cyst is a sac filled with material that is often cheesy, fatty, oily, or fibrous. The material in them can be thick (like cottage cheese) or liquid.    They form slowly under the skin, and can be found on most parts of the body. They are most often found in hairier areas like the scalp, face, upper back, and genitals.    You can usually move them slightly if you try.    They can be smaller than a pea or as large as a few inches.    They are usually not painful, unless they become inflamed or infected.    The area around the cyst may smell bad. If the cyst breaks open, the material inside it often smells bad too.  Causes  Epidermoid cysts are caused when skin (epidermal) cells move under the skin surface, or are covered over by it. These cells continue to multiply, like skin does normally. They then form a wall around themselves (cyst) and secrete normal skin fluids (keratin). This may be developmental. But it often happens because of an injury to the skin.  Epidermoid cysts are often found around hair follicles. These follicles are like cysts, but they have openings. Normal lubricating oils for your hair are sent out through these openings. A cyst occurs when an opening becomes blocked or the site inflamed. This often occurs when there is damage to the hair follicles by a scrape or wound.    Roxi  cysts are similar to epidermoid cysts. But they start from a different part of the hair follicle, and are more likely to be on the scalp.  Symptoms    Feeling a lump just beneath the skin    It may or may not be painful    The cyst may or may not smell bad    The cyst may become inflamed or red    The cyst may leak fluid or thick material  Home care  Epidermoid cysts often go away without any treatment. If your cyst doesn t go away, and it bothers you, it may be drained or removed. If the cyst drains on its own, it may return. Resist the temptation to squeeze, pop, stick a needle in it, or cut it open. This often leads to an infection and scarring. If it gets severely inflamed or infected, you should seek medical care. Be sure to clean the cyst area when bathing or showering. Watch for the signs of infection listed below.  Follow-up care  Follow up with your healthcare provider, or as advised.  When to seek medical advice  Call your healthcare provider right away if any of these occur:    Swelling, redness, or pain    Pus coming from the cyst  Date Last Reviewed: 8/1/2016 2000-2017 [employer]. [address] [place] [address]. All rights reserved. This information is not intended as a substitute for professional medical care. Always follow your healthcare professional's instructions.                Follow-ups after your visit        Follow-up notes from your care team     Return if symptoms worsen or fail to improve.      Who to contact     If you have questions or need follow up information about today's clinic visit or your schedule please contact [facility] directly at [phone].  Normal or non-critical lab and imaging results will be communicated to you by MyChart, letter or phone within 4 business days after the clinic has received the results. If you do not hear from us within 7 days, please contact the clinic through MyChart or phone. If you have a critical or  abnormal lab result, we will notify you by phone as soon as possible.  Submit refill requests through [employer] or call your pharmacy and they will forward the refill request to us. Please allow 3 business days for your refill to be completed.          Additional Information About Your Visit        [employer] Information     [employer] gives you secure access to your electronic health record. If you see a primary care provider, you can also send messages to your care team and make appointments. If you have questions, please call your primary care clinic.  If you do not have a primary care provider, please call [phone] and they will assist you.        Care EveryWhere ID     This is your Care EveryWhere ID. This could be used by other organizations to access your [place] medical records  [id number]        Your Vitals Were     Pulse Temperature Pulse Oximetry BMI (Body Mass Index)          63 97.9  F (36.6  C) (Oral) 99% 34.22 kg/m2         Blood Pressure from Last 3 Encounters:   10/30/17 110/70   10/29/17 120/76   09/19/17 130/82    Weight from Last 3 Encounters:   12/03/17 212 lb (96.2 kg)   08/24/17 198 lb (89.8 kg)   06/14/17 199 lb (90.3 kg)              Today, you had the following     No orders found for display       Primary Care Provider Office Phone # Fax #    Marian Xiao Haase, APRN [phone] [address] [facility] [address]        Equal Access to Services     RAQUEL SANCHEZ : Hadii [place] ku lorenzao [place], waaxda luqadaha, qaybta kaalmada adeegyada, dione waters. So [facility] [phone].    ATENCIÓN: Si habla español, tiene a tolbert disposición servicios gratuitos de asistencia lingüística. Llame al [phone].    We comply with applicable federal civil rights laws and [place] laws. We do not discriminate on the basis of race, color, national origin, age, disability, sex, sexual orientation, or gender identity.            Thank you!     Thank you  for choosing [facility]  for your care. Our goal is always to provide you with excellent care. Hearing back from our patients is one way we can continue to improve our services. Please take a few minutes to complete the written survey that you may receive in the mail after your visit with us. Thank you!             Your Updated Medication List - Protect others around you: Learn how to safely use, store and throw away your medicines at www.disposemymeds.org.          This list is accurate as of: 12/3/17  2:23 PM.  Always use your most recent med list.                   Brand Name Dispense Instructions for use Diagnosis    amLODIPine-benazepril 10-40 MG per capsule    LOTREL    90 capsule    Take 1 capsule by mouth daily    Hypertension goal BP (blood pressure) < 140/90       FLUoxetine 20 MG capsule    PROzac    30 capsule    Take 1 capsule (20 mg) by mouth daily    Anxiety       hydrOXYzine 25 MG tablet    ATARAX    60 tablet    Take 1-2 tablets (25-50 mg) by mouth every 6 hours as needed for anxiety    Anxiety       naltrexone 50 MG tablet    DEPADE;REVIA    30 tablet    Take 1 tablet (50 mg) by mouth daily    Pathological gambling       tacrolimus 0.1 % ointment    PROTOPIC    60 g    Apply topically 2 times daily        valACYclovir 1000 mg tablet    VALTREX    90 tablet    Take 1 tablet (1,000 mg) by mouth daily    Recurrent cold sores

## 2017-12-03 NOTE — PATIENT INSTRUCTIONS
Epidermoid Cyst (Sebaceous Cyst), No Infection  An epidermoid cyst (sebaceous cyst) is a term that refers to 2 similar types of cysts: those found in the skin (epidermoid), and those found around hair follicles (pilar).  Some general facts about these cysts:    A cyst is a sac filled with material that is often cheesy, fatty, oily, or fibrous. The material in them can be thick (like cottage cheese) or liquid.    They form slowly under the skin, and can be found on most parts of the body. They are most often found in hairier areas like the scalp, face, upper back, and genitals.    You can usually move them slightly if you try.    They can be smaller than a pea or as large as a few inches.    They are usually not painful, unless they become inflamed or infected.    The area around the cyst may smell bad. If the cyst breaks open, the material inside it often smells bad too.  Causes  Epidermoid cysts are caused when skin (epidermal) cells move under the skin surface, or are covered over by it. These cells continue to multiply, like skin does normally. They then form a wall around themselves (cyst) and secrete normal skin fluids (keratin). This may be developmental. But it often happens because of an injury to the skin.  Epidermoid cysts are often found around hair follicles. These follicles are like cysts, but they have openings. Normal lubricating oils for your hair are sent out through these openings. A cyst occurs when an opening becomes blocked or the site inflamed. This often occurs when there is damage to the hair follicles by a scrape or wound.    Pilar cysts are similar to epidermoid cysts. But they start from a different part of the hair follicle, and are more likely to be on the scalp.  Symptoms    Feeling a lump just beneath the skin    It may or may not be painful    The cyst may or may not smell bad    The cyst may become inflamed or red    The cyst may leak fluid or thick material  Home care  Epidermoid  cysts often go away without any treatment. If your cyst doesn t go away, and it bothers you, it may be drained or removed. If the cyst drains on its own, it may return. Resist the temptation to squeeze, pop, stick a needle in it, or cut it open. This often leads to an infection and scarring. If it gets severely inflamed or infected, you should seek medical care. Be sure to clean the cyst area when bathing or showering. Watch for the signs of infection listed below.  Follow-up care  Follow up with your healthcare provider, or as advised.  When to seek medical advice  Call your healthcare provider right away if any of these occur:    Swelling, redness, or pain    Pus coming from the cyst  Date Last Reviewed: 8/1/2016 2000-2017 [employer] [address] [place] [address], [place] [address]. All rights reserved. This information is not intended as a substitute for professional medical care. Always follow your healthcare professional's instructions.

## 2017-12-04 NOTE — PROGRESS NOTES
S: Lwaanda Katz is a 47 year old female with c/o small, hard skin nodule on the left vulvar region noted 4 weeks ago.  The lesion is not growing in size but is a little tender from her squeezing it lately.      O: Pulse 63  Temp 97.9  F (36.6  C) (Oral)  Wt 212 lb (96.2 kg)  SpO2 99%  BMI 34.22 kg/m2   Gen: NAD  : 4 mm sebaceous cyst at left labia majora region, not red, swollen or tender    A/P: Left labia majora sebaceous cyst  No procedure indicated today.  Do not keep squeezing it.  Recommend see OB/Gyn if desires removal of the cyst.  RTC if the lesion worsens.      Fatoumata Stewart MD

## 2017-12-12 ENCOUNTER — OFFICE VISIT (OUTPATIENT)
Dept: FAMILY MEDICINE | Facility: CLINIC | Age: 47
End: 2017-12-12
Payer: COMMERCIAL

## 2017-12-12 ENCOUNTER — RADIANT APPOINTMENT (OUTPATIENT)
Dept: GENERAL RADIOLOGY | Facility: CLINIC | Age: 47
End: 2017-12-12
Attending: NURSE PRACTITIONER
Payer: COMMERCIAL

## 2017-12-12 VITALS
RESPIRATION RATE: 12 BRPM | WEIGHT: 200 LBS | HEART RATE: 80 BPM | TEMPERATURE: 98.2 F | BODY MASS INDEX: 32.28 KG/M2 | DIASTOLIC BLOOD PRESSURE: 78 MMHG | SYSTOLIC BLOOD PRESSURE: 120 MMHG

## 2017-12-12 DIAGNOSIS — M79.645 PAIN OF FINGER OF LEFT HAND: Primary | ICD-10-CM

## 2017-12-12 DIAGNOSIS — M79.645 PAIN OF FINGER OF LEFT HAND: ICD-10-CM

## 2017-12-12 PROCEDURE — 73130 X-RAY EXAM OF HAND: CPT | Mod: LT

## 2017-12-12 PROCEDURE — 99213 OFFICE O/P EST LOW 20 MIN: CPT | Performed by: NURSE PRACTITIONER

## 2017-12-12 NOTE — MR AVS SNAPSHOT
After Visit Summary   12/12/2017    Lawanda Katz    MRN: [id number]           Patient Information     Date Of Birth          1970        Visit Information        Provider Department      12/12/2017 11:45 AM Haase, Susan Rachele, APRN CNP [facility]        Today's Diagnoses     Pain of finger of left hand    -  1       Follow-ups after your visit        Follow-up notes from your care team     Return if symptoms worsen or fail to improve.      Your next 10 appointments already scheduled     Dec 12, 2017 11:45 AM CST   SHORT with Susan Rachele Haase, APRN CNP   [facility] ([facility])    [id number] [facility] [id number] [phone]              Future tests that were ordered for you today     Open Future Orders        Priority Expected Expires Ordered    XR Hand Left G/E 3 Views Routine 12/12/2017 12/12/2018 12/12/2017            Who to contact     If you have questions or need follow up information about today's clinic visit or your schedule please contact [facility] directly at [phone].  Normal or non-critical lab and imaging results will be communicated to you by [employer], letter or phone within 4 business days after the clinic has received the results. If you do not hear from us within 7 days, please contact the clinic through [employer] or phone. If you have a critical or abnormal lab result, we will notify you by phone as soon as possible.  Submit refill requests through [employer] or call your pharmacy and they will forward the refill request to us. Please allow 3 business days for your refill to be completed.          Additional Information About Your Visit        [employer] Information     [employer] gives you secure access to your electronic health record. If you see a primary care provider, you can also send messages to your care team and make appointments. If you have questions, please call your  primary care clinic.  If you do not have a primary care provider, please call [phone] and they will assist you.        Care EveryWhere ID     This is your Care EveryWhere ID. This could be used by other organizations to access your [place] medical records  [id number]        Your Vitals Were     Pulse Temperature Respirations BMI (Body Mass Index)          80 98.2  F (36.8  C) (Oral) 12 32.28 kg/m2         Blood Pressure from Last 3 Encounters:   12/12/17 120/78   10/30/17 110/70   10/29/17 120/76    Weight from Last 3 Encounters:   12/12/17 200 lb (90.7 kg)   12/03/17 212 lb (96.2 kg)   08/24/17 198 lb (89.8 kg)               Primary Care Provider Office Phone # Fax #    Susan Rachele Haase, APRN [phone]       [address] [facility] [address]        Equal Access to Services     ROSI SANCHEZ : Hadii aad ku hadasho Soomaali, waaxda luqadaha, qaybta kaalmada adeegyada, waxay idiin hayaan radha charles . So [facility] [phone].    ATENCIÓN: Si habla español, tiene a tolbert disposición servicios gratuitos de asistencia lingüística. Llame al [phone].    We comply with applicable federal civil rights laws and [place] laws. We do not discriminate on the basis of race, color, national origin, age, disability, sex, sexual orientation, or gender identity.            Thank you!     Thank you for choosing [facility]  for your care. Our goal is always to provide you with excellent care. Hearing back from our patients is one way we can continue to improve our services. Please take a few minutes to complete the written survey that you may receive in the mail after your visit with us. Thank you!             Your Updated Medication List - Protect others around you: Learn how to safely use, store and throw away your medicines at www.disposemymeds.org.          This list is accurate as of: 12/12/17  9:08 AM.  Always use your most recent med list.                   Brand  Name Dispense Instructions for use Diagnosis    amLODIPine-benazepril 10-40 MG per capsule    LOTREL    90 capsule    Take 1 capsule by mouth daily    Hypertension goal BP (blood pressure) < 140/90       FLUoxetine 20 MG capsule    PROzac    30 capsule    Take 1 capsule (20 mg) by mouth daily    Anxiety       hydrOXYzine 25 MG tablet    ATARAX    60 tablet    Take 1-2 tablets (25-50 mg) by mouth every 6 hours as needed for anxiety    Anxiety       naltrexone 50 MG tablet    DEPADE;REVIA    30 tablet    Take 1 tablet (50 mg) by mouth daily    Pathological gambling       tacrolimus 0.1 % ointment    PROTOPIC    60 g    Apply topically 2 times daily        valACYclovir 1000 mg tablet    VALTREX    90 tablet    Take 1 tablet (1,000 mg) by mouth daily    Recurrent cold sores

## 2017-12-12 NOTE — PROGRESS NOTES
SUBJECTIVE:   Lawanda Katz is a 47 year old female who presents to clinic today for the following health issues:    Musculoskeletal problem/pain      Duration: 1 day    Description  Location: L pinky finger    Intensity:  mild    Accompanying signs and symptoms: swelling and bruising    History  Previous similar problem: YES broke same finger 10 years ago  Previous evaluation:  x-ray    Precipitating or alleviating factors:  Trauma or overuse: YES- fell down the stairs  Aggravating factors include: none    Therapies tried and outcome: monae Irwin reports that she fell down the stairs of her deck yesterday. She was squeezing the handrail as she slid down, and injured her hand on the ulnar side. She notes that it was swollen and painful when she tried to  her steering wheel. She also has some pain in her shoulder and hip, but she is not concerned with those at this time.  She iced her hand afterworlds and currently rates the pain as mild. She notes that she broke her finger in the same place 10 years ago. She did not have surgery. She denies numbness or tingling or wrist pain.       Problem list and histories reviewed & adjusted, as indicated.  Additional history: as documented    Reviewed and updated as needed this visit by clinical staff  Tobacco  Allergies  Meds  Med Hx  Surg Hx  Fam Hx  Soc Hx      Reviewed and updated as needed this visit by Provider       ROS:  Constitutional, musculoskeletal, neuro and psych systems are negative, except as otherwise noted.    This document serves as a record of the services and decisions personally performed and made by Susan Haase, CNP. It was created on her behalf by Demetrio Kirkland, a trained medical scribe. The creation of this document is based the provider's statements to the medical scribe.  Demetrio Kirkland December 12, 2017 9:00 AM      OBJECTIVE:   /78 (BP Location: Left arm, Patient Position: Chair, Cuff Size: Adult Regular)  Pulse 80  Temp 98.2  F  (36.8  C) (Oral)  Resp 12  Wt 90.7 kg (200 lb)  BMI 32.28 kg/m2  Body mass index is 32.28 kg/(m^2).  GENERAL: healthy, alert and no distress  MS: lateral aspect of left hand with a 2 cm are of ecchymosis, slight edema and tenderness to palpation.  Left 5th finger with full ROM and strength, no joint swelling.    NEURO:  CMS to LUE intact  PSYCH: mentation appears normal, affect normal/bright    ASSESSMENT/PLAN:   Lawanda was seen today for musculoskeletal problem.    Diagnoses and all orders for this visit:    Pain of finger of left hand:  Xray appears normal, patient informed. Discussed continued ice for 15-20 min tid.   -     XR Hand Left G/E 3 Views; Future      Follow up as needed if symptoms get worse or do not improve.   The information in this document, created by the medical scribe for me, accurately reflects the services I personally performed and the decisions made by me. I have reviewed and approved this document for accuracy.   Susan Haase, CNP Susan Haase, APRN CNP  [facility]

## 2018-01-10 ENCOUNTER — E-VISIT (OUTPATIENT)
Dept: FAMILY MEDICINE | Facility: CLINIC | Age: 48
End: 2018-01-10
Payer: COMMERCIAL

## 2018-01-10 DIAGNOSIS — Z53.9 ERRONEOUS ENCOUNTER--DISREGARD: Primary | ICD-10-CM

## 2018-01-10 ASSESSMENT — ANXIETY QUESTIONNAIRES
7. FEELING AFRAID AS IF SOMETHING AWFUL MIGHT HAPPEN: NOT AT ALL
2. NOT BEING ABLE TO STOP OR CONTROL WORRYING: MORE THAN HALF THE DAYS
6. BECOMING EASILY ANNOYED OR IRRITABLE: MORE THAN HALF THE DAYS
7. FEELING AFRAID AS IF SOMETHING AWFUL MIGHT HAPPEN: NOT AT ALL
4. TROUBLE RELAXING: MORE THAN HALF THE DAYS
5. BEING SO RESTLESS THAT IT IS HARD TO SIT STILL: SEVERAL DAYS
GAD7 TOTAL SCORE: 12
3. WORRYING TOO MUCH ABOUT DIFFERENT THINGS: MORE THAN HALF THE DAYS
GAD7 TOTAL SCORE: 12
1. FEELING NERVOUS, ANXIOUS, OR ON EDGE: NEARLY EVERY DAY

## 2018-01-10 NOTE — MR AVS SNAPSHOT
After Visit Summary   1/10/2018    Lawanda Katz    MRN: [id number]           Patient Information     Date Of Birth          1970        Visit Information        Provider Department      1/10/2018 7:17 AM Haase, Susan Rachele, APRN CNP [facility]        Today's Diagnoses     ERRONEOUS ENCOUNTER--DISREGARD    -  1       Follow-ups after your visit        Who to contact     If you have questions or need follow up information about today's clinic visit or your schedule please contact [facility] directly at [phone].  Normal or non-critical lab and imaging results will be communicated to you by Cold Genesyshart, letter or phone within 4 business days after the clinic has received the results. If you do not hear from us within 7 days, please contact the clinic through Cold Genesyshart or phone. If you have a critical or abnormal lab result, we will notify you by phone as soon as possible.  Submit refill requests through Koko or call your pharmacy and they will forward the refill request to us. Please allow 3 business days for your refill to be completed.          Additional Information About Your Visit        MyChart Information     Koko gives you secure access to your electronic health record. If you see a primary care provider, you can also send messages to your care team and make appointments. If you have questions, please call your primary care clinic.  If you do not have a primary care provider, please call [phone] and they will assist you.        Care EveryWhere ID     This is your Care EveryWhere ID. This could be used by other organizations to access your [place] medical records  [id number]         Blood Pressure from Last 3 Encounters:   12/12/17 120/78   10/30/17 110/70   10/29/17 120/76    Weight from Last 3 Encounters:   12/12/17 200 lb (90.7 kg)   12/03/17 212 lb (96.2 kg)   08/24/17 198 lb (89.8 kg)              Today, you had the following     No  orders found for display       Primary Care Provider Office Phone # Fax #    Susan Rachele Haase, APRN [phone] [address]  [facility] [address]        Equal Access to Services     RAQUEL SANCHEZ : Lyric bradford lorenzao Sokapilali, waaxda luqadaha, qaybta kaalmada adeegyada, waxshane landn radha cantu araceli waters. So [facility] [phone].    ATENCIÓN: Si habla español, tiene a tolbert disposición servicios gratuitos de asistencia lingüística. Llame al [phone].    We comply with applicable federal civil rights laws and [place] laws. We do not discriminate on the basis of race, color, national origin, age, disability, sex, sexual orientation, or gender identity.            Thank you!     Thank you for choosing [facility]  for your care. Our goal is always to provide you with excellent care. Hearing back from our patients is one way we can continue to improve our services. Please take a few minutes to complete the written survey that you may receive in the mail after your visit with us. Thank you!             Your Updated Medication List - Protect others around you: Learn how to safely use, store and throw away your medicines at www.disposemymeds.org.          This list is accurate as of: 1/10/18  1:38 PM.  Always use your most recent med list.                   Brand Name Dispense Instructions for use Diagnosis    amLODIPine-benazepril 10-40 MG per capsule    LOTREL    90 capsule    Take 1 capsule by mouth daily    Hypertension goal BP (blood pressure) < 140/90       FLUoxetine 20 MG capsule    PROzac    30 capsule    Take 1 capsule (20 mg) by mouth daily    Anxiety       hydrOXYzine 25 MG tablet    ATARAX    60 tablet    Take 1-2 tablets (25-50 mg) by mouth every 6 hours as needed for anxiety    Anxiety       naltrexone 50 MG tablet    DEPADE;REVIA    30 tablet    Take 1 tablet (50 mg) by mouth daily    Pathological gambling       tacrolimus 0.1 % ointment    PROTOPIC     60 g    Apply topically 2 times daily        valACYclovir 1000 mg tablet    VALTREX    90 tablet    Take 1 tablet (1,000 mg) by mouth daily    Recurrent cold sores

## 2018-01-11 ASSESSMENT — ANXIETY QUESTIONNAIRES: GAD7 TOTAL SCORE: 12

## 2018-01-30 ENCOUNTER — ALLIED HEALTH/NURSE VISIT (OUTPATIENT)
Dept: FAMILY MEDICINE | Facility: CLINIC | Age: 48
End: 2018-01-30
Payer: COMMERCIAL

## 2018-01-30 VITALS — SYSTOLIC BLOOD PRESSURE: 138 MMHG | DIASTOLIC BLOOD PRESSURE: 88 MMHG

## 2018-01-30 DIAGNOSIS — I10 HYPERTENSION GOAL BP (BLOOD PRESSURE) < 140/90: Primary | ICD-10-CM

## 2018-01-30 PROCEDURE — 99207 ZZC NO CHARGE NURSE ONLY: CPT | Performed by: NURSE PRACTITIONER

## 2018-01-30 NOTE — PROGRESS NOTES
Lawanda Katz is enrolled/participating in the retail pharmacy Blood Pressure Goals Achievement Program (BPGAP).  Lawanda Katz was evaluated at [facility] on January 30, 2018 at which time her blood pressure was:    BP Readings from Last 3 Encounters:   01/30/18 138/88   12/12/17 120/78   10/30/17 110/70     Reviewed lifestyle modifications for blood pressure control and reduction: including making healthy food choices, managing weight, getting regular exercise, smoking cessation, reducing alcohol consumption, monitoring blood pressure regularly.     Lawanda Katz is not experiencing symptoms.    Follow-Up: BP is at goal of < 140/90mmHg (patient 18+ years of age with or without diabetes).  Recommended follow-up at pharmacy in 6 months.     Recommendation to Provider: Patient is at goal    Lawanda Katz was evaluated for enrollment into the PGEN study today.    Patient eligible for enrollment:  No  Patient interested in enrollment:  No    Completed by: Mary Green

## 2018-01-30 NOTE — MR AVS SNAPSHOT
After Visit Summary   1/30/2018    Lawanda Katz    MRN: [id number]           Patient Information     Date Of Birth          1970        Visit Information        Provider Department      1/30/2018 3:57 PM Haase, Susan Rachele, APRN CNP [facility]        Today's Diagnoses     Hypertension goal BP (blood pressure) < 140/90    -  1       Follow-ups after your visit        Who to contact     If you have questions or need follow up information about today's clinic visit or your schedule please contact [facility] directly at [phone].  Normal or non-critical lab and imaging results will be communicated to you by Toopherhart, letter or phone within 4 business days after the clinic has received the results. If you do not hear from us within 7 days, please contact the clinic through Toopherhart or phone. If you have a critical or abnormal lab result, we will notify you by phone as soon as possible.  Submit refill requests through Sweeten or call your pharmacy and they will forward the refill request to us. Please allow 3 business days for your refill to be completed.          Additional Information About Your Visit        MyChart Information     Sweeten gives you secure access to your electronic health record. If you see a primary care provider, you can also send messages to your care team and make appointments. If you have questions, please call your primary care clinic.  If you do not have a primary care provider, please call [phone] and they will assist you.        Care EveryWhere ID     This is your Care EveryWhere ID. This could be used by other organizations to access your [facility] medical records  [id number]         Blood Pressure from Last 3 Encounters:   01/30/18 138/88   12/12/17 120/78   10/30/17 110/70    Weight from Last 3 Encounters:   12/12/17 200 lb (90.7 kg)   12/03/17 212 lb (96.2 kg)   08/24/17 198 lb (89.8 kg)              Today, you had the  following     No orders found for display       Primary Care Provider Office Phone # Fax #    Susan Rachele Haase, DANIELE [phone] [address]  [facility] [address]        Equal Access to Services     RAQUEL SANCHEZ : [place] ku hadcandaceo Soomaali, waaxda luqadaha, qaybta kaalmada adeegyada, waxshane idiin haysamueln adejaneen cantu araceli waters. So [facility] [phone].    ATENCIÓN: Si habla español, tiene a tolbert disposición servicios gratuitos de asistencia lingüística. Llame al [phone].    We comply with applicable federal civil rights laws and [place] laws. We do not discriminate on the basis of race, color, national origin, age, disability, sex, sexual orientation, or gender identity.            Thank you!     Thank you for choosing [facility]  for your care. Our goal is always to provide you with excellent care. Hearing back from our patients is one way we can continue to improve our services. Please take a few minutes to complete the written survey that you may receive in the mail after your visit with us. Thank you!             Your Updated Medication List - Protect others around you: Learn how to safely use, store and throw away your medicines at www.disposemymeds.org.          This list is accurate as of 1/30/18  3:59 PM.  Always use your most recent med list.                   Brand Name Dispense Instructions for use Diagnosis    amLODIPine-benazepril 10-40 MG per capsule    LOTREL    90 capsule    Take 1 capsule by mouth daily    Hypertension goal BP (blood pressure) < 140/90       FLUoxetine 20 MG capsule    PROzac    30 capsule    Take 1 capsule (20 mg) by mouth daily    Anxiety       hydrOXYzine 25 MG tablet    ATARAX    60 tablet    Take 1-2 tablets (25-50 mg) by mouth every 6 hours as needed for anxiety    Anxiety       naltrexone 50 MG tablet    DEPADE;REVIA    30 tablet    Take 1 tablet (50 mg) by mouth daily    Pathological gambling       tacrolimus 0.1 %  ointment    PROTOPIC    60 g    Apply topically 2 times daily        valACYclovir 1000 mg tablet    VALTREX    90 tablet    Take 1 tablet (1,000 mg) by mouth daily    Recurrent cold sores

## 2018-03-01 ENCOUNTER — MYC REFILL (OUTPATIENT)
Dept: FAMILY MEDICINE | Facility: CLINIC | Age: 48
End: 2018-03-01

## 2018-03-01 DIAGNOSIS — B00.1 RECURRENT COLD SORES: ICD-10-CM

## 2018-03-01 DIAGNOSIS — F41.9 ANXIETY: ICD-10-CM

## 2018-03-01 DIAGNOSIS — I10 HYPERTENSION GOAL BP (BLOOD PRESSURE) < 140/90: ICD-10-CM

## 2018-03-01 RX ORDER — VALACYCLOVIR HYDROCHLORIDE 1 G/1
1000 TABLET, FILM COATED ORAL DAILY
Qty: 90 TABLET | Refills: 0 | Status: CANCELLED | OUTPATIENT
Start: 2018-03-01

## 2018-03-01 RX ORDER — AMLODIPINE AND BENAZEPRIL HYDROCHLORIDE 10; 40 MG/1; MG/1
1 CAPSULE ORAL DAILY
Qty: 90 CAPSULE | Refills: 0 | Status: CANCELLED | OUTPATIENT
Start: 2018-03-01

## 2018-03-01 RX ORDER — HYDROXYZINE HYDROCHLORIDE 25 MG/1
25-50 TABLET, FILM COATED ORAL EVERY 6 HOURS PRN
Qty: 60 TABLET | Refills: 0 | Status: CANCELLED | OUTPATIENT
Start: 2018-03-01

## 2018-03-01 NOTE — TELEPHONE ENCOUNTER
Message from [employer]og[employer]:  Original authorizing provider: Susan Haase, APRN CNP [facility] would like a refill of the following medications:  valACYclovir (VALTREX) 1000 mg tablet [Susan Haase, APRN CNP]  FLUoxetine (PROZAC) 20 MG capsule [Susan Haase, APRN CNP]  hydrOXYzine (ATARAX) 25 MG tablet [Susan Haase, APRN CNP]  amLODIPine-benazepril (LOTREL) 10-40 MG per capsule [Susan Haase, APRN CNP]    Preferred pharmacy: [id number] - [place], [place] - [address]    Comment:

## 2018-03-01 NOTE — TELEPHONE ENCOUNTER
Duplicate, see rx's sent 2/20/18, sent [employer] message informing  Laina Tillman RN, BSN  Message handled by Nurse Triage.

## 2018-03-02 NOTE — TELEPHONE ENCOUNTER
Called [facility], they have 4 prescriptions and are processing them now, sent [employer] message confirming  Laina Tillman RN, BSN  Message handled by Nurse Triage.

## 2019-03-13 NOTE — TELEPHONE ENCOUNTER
, sent vit D rx, future order placed for vit D f/u, see Mychart message,  FYI  Prescription approved per G Refill Protocol.  Laina Tillman, RN, BSN     within the past 12 months

## 2019-11-07 ENCOUNTER — HEALTH MAINTENANCE LETTER (OUTPATIENT)
Age: 49
End: 2019-11-07

## 2020-02-16 ENCOUNTER — HEALTH MAINTENANCE LETTER (OUTPATIENT)
Age: 50
End: 2020-02-16

## 2020-11-29 ENCOUNTER — HEALTH MAINTENANCE LETTER (OUTPATIENT)
Age: 50
End: 2020-11-29

## 2021-04-10 ENCOUNTER — HEALTH MAINTENANCE LETTER (OUTPATIENT)
Age: 51
End: 2021-04-10

## 2021-07-05 ENCOUNTER — OFFICE VISIT (OUTPATIENT)
Dept: URGENT CARE | Facility: URGENT CARE | Age: 51
End: 2021-07-05
Payer: COMMERCIAL

## 2021-07-05 VITALS
OXYGEN SATURATION: 98 % | TEMPERATURE: 98.1 F | SYSTOLIC BLOOD PRESSURE: 124 MMHG | BODY MASS INDEX: 30.46 KG/M2 | DIASTOLIC BLOOD PRESSURE: 80 MMHG | WEIGHT: 188.7 LBS | HEART RATE: 73 BPM

## 2021-07-05 DIAGNOSIS — M26.622 TMJ TENDERNESS, LEFT: Primary | ICD-10-CM

## 2021-07-05 PROCEDURE — 99203 OFFICE O/P NEW LOW 30 MIN: CPT | Performed by: FAMILY MEDICINE

## 2021-07-05 NOTE — PROGRESS NOTES
ASSESSMENT/  PLAN:   TMJ tenderness, left     PLAN: Patient was reassured that there is no infection or damage in the ear canals.  We discussed that most acute injuries to the TMJ joint will  gradually resolve with rest, avoiding movements that aggravate the pain  and treatment with anti inflammatories like ibuprofen.  Chronic TMJ pain, which may be associated with jaw clenching and/or night-time grinding of the teeth may require use of a dental appliance to be worn at night.  If the TMJ pain does not spontaneously resolve within 3 weeks consider further evaluation and treatment  with their dentist and/or ENT.    Symptomatic treatment with acetaminophen/ ibuprofen  Patient education materials were provided     -------------------------------------------------------------------------------------------------------------------------      SUBJECTIVE:  Chief Complaint   Patient presents with     Urgent Care     Ear Problem     2 weeks sx left ear pain, open jaw feels a lump, swelling hx of sinus infection 3 weeks ago tx Ibuprofen       Lawanda Katz is a 51 year old female who presents with 2 weeks history of continuous left ear canal/  jaw pain which is localized to just in front of the left ear(s). His symptoms have increased over the last 1 week.  Associated symptoms include: tenderness.  He denies rhinorrhea, facial pain, upper dental pain and headache.  Exacerbating activities include: - none noted.     Has taken   OTC remedies for relief.        Past Medical History:   Diagnosis Date     Anxiety      Depression      Hypertension      Patient Active Problem List   Diagnosis     Hypertension goal BP (blood pressure) < 140/90     Recurrent cold sores     S/P hysterectomy     CARDIOVASCULAR SCREENING; LDL GOAL LESS THAN 160     Anxiety     Vitamin D deficiency     Pathological gambling         ALLERGIES:  Hydrochlorothiazide, Metronidazole, Wellbutrin [bupropion], and Morphine    MEDs  amLODIPine-benazepril  (LOTREL) 10-40 MG per capsule, Take 1 capsule by mouth daily  hydrOXYzine (ATARAX) 25 MG tablet, Take 1-2 tablets (25-50 mg) by mouth every 6 hours as needed for anxiety  tacrolimus (PROTOPIC) 0.1 % ointment, Apply topically 2 times daily  valACYclovir (VALTREX) 1000 mg tablet, Take 1 tablet (1,000 mg) by mouth daily  FLUoxetine (PROZAC) 20 MG capsule, Take 1 capsule (20 mg) by mouth daily (Patient not taking: Reported on 7/5/2021)    No current facility-administered medications on file prior to visit.       Social History     Tobacco Use     Smoking status: Never Smoker     Smokeless tobacco: Never Used   Substance Use Topics     Alcohol use: No     Alcohol/week: 0.0 standard drinks       Family History   Problem Relation Age of Onset     Heart Disease Mother      Unknown/Adopted No family hx of        ROS:  CONSTITUTIONAL:NEGATIVE for fever, chills   INTEGUMENTARY/SKIN: NEGATIVE for worrisome rashes,  or lesions  EYES: NEGATIVE for vision changes or irritation  RESP:NEGATIVE for significant cough or SOB  GI: NEGATIVE for nausea, abdominal pain,   or change in bowel habits    OBJECTIVE:  /80   Pulse 73   Temp 98.1  F (36.7  C)   Wt 85.6 kg (188 lb 11.2 oz)   SpO2 98%   BMI 30.46 kg/m      GENERAL:  Alert, mild distress  ENT:  External ears and canals clear bilaterally.  TM's normal bilaterally.  Nose normal without lesions or discharge.  Oropharynx normal.    Jaw shows tenderness to palpation and no crepitation in the left TMJ  .  The pain in the TMJ is worse with movement and ROM of the jaw.  No unusual dental wear pattern  NECK:   supple without palpable adenopathy. .normal pain free ROM  EYES: EOMI,   conjunctiva clear  RESP: no labored respirations, no tachypnea  NEURO: Normal strength and tone,    normal speech and mentation  SKIN: no suspicious lesions or rashes

## 2021-07-05 NOTE — LETTER
[facility] [place]  [address]  [address]  [place] [address] [phone]      July 5, 2021    RE:  Lawanda Katz                                                                                                                                                       [address]  [place] [address]            To whom it may concern:    Lawanda Katz is under my professional care          Sincerely,        Ewelina Mock MD    [facility]

## 2021-07-05 NOTE — PATIENT INSTRUCTIONS
Patient Education     Pain Relief Methods for Temporomandibular Disorders (TMD)  You have been diagnosed with temporomandibular disorder (TMD). This term describes a group of problems linked to the temporomandibular joint (TMJ)and nearby jaw muscles. The TMJ is located where the upper and lower jaws meet. TMD can cause painful and frustrating symptoms. Your healthcare provider can advise different pain relief methods as part of your treatment. These may include medicines and certain types of therapy, such as massage or gentle exercise.   Using medicines    Medicines may be prescribed to treat TMD. Other medicines may be available over the counter. The medicine type and dosage will depend on the problem you have. For your safety, tell your healthcare provider if you are currently taking any medicines, vitamins, herbs, or supplements. Common medicines used to treat TMD include:     Anti-inflammatories and analgesics. These treat pain, inflammation, osteoarthritis, and rheumatoid arthritis. Anti-inflammatories reduce swelling, heat, redness, and pain. They also help restore function. Analgesics reduce pain. Nonsteroidal anti-inflammatories (NSAIDs) ease inflammation as well as pain.    Muscle relaxants. These treat myofascial pain. This is pain that occurs in the soft tissues or muscles around the TMJ. Muscle relaxants help ease muscle tension which reduces pressure on the TMJ from tight jaw muscles.    Antidepressants. These can be used to reduce pain or teeth grinding (bruxism). At higher dosages, these medicines are used to treat depression. Given at low dosages, antidepressants may help ease TMD symptoms. They can reduce muscle pain. They also raise the level of serotonin, a body chemical that improves sleep. This in turn can decrease teeth grinding during the night.  Treating painful muscles  A trigger point is a painful spot in a tight muscle. It is often painful to the touch and may refer pain to other places.  Your healthcare provider can focus on trigger points using:     Massage. This can be done both inside and outside the mouth. This relaxes muscles and improves circulation.    Palpation. This is done by applying pressure to points of the jaw and face with the fingers.    Cold spray and stretching of the muscles. This is done to relax the muscles.    An anesthetic. This can ease pain. This may be given as an injection by your dentist.  Treating the joint  Therapy may focus directly on the TMJ. There are different ways to treat the joint:    A self-care program. This helps you treat and manage symptoms on your own. Your program may include exercises. It may also include using ice and heat to ease pain.    Gentle manipulation. This reduces pain and restores range of motion. The healthcare provider uses their hands to relax muscles and ligaments around the joint.    Exercises. These strengthen muscles in the jaw and face.    Ultrasound. This uses sound waves to reduce pain and swelling.  Treating inflammation  When the joint is inflamed, movement becomes difficult and painful. Your healthcare provider can help. Treatment may include:     Rest and gentle exercise. This is done to increase range of motion. One common exercise is to apply pressure to the jaw and resist the movement (isometric exercise).    A cold pack. This eases swelling and reduces pain. A cold pack may be applied for  10 to 20 minutes. Repeat 3 or 4 times a day. To make a cold pack, put ice cubes in a plastic bag that seals at the top. Wrap the bag in a clean, thin towel or cloth. Never put ice or a cold pack directly on the skin.    Massage and gentle manipulation.  As described above.  [employer] last reviewed this educational content on 6/1/2020 2000-2021 [employer]. All rights reserved. This information is not intended as a substitute for professional medical care. Always follow your healthcare professional's instructions.

## 2021-08-02 ENCOUNTER — HOSPITAL ENCOUNTER (OUTPATIENT)
Dept: CT IMAGING | Facility: CLINIC | Age: 51
Discharge: HOME | End: 2021-08-02
Attending: SPECIALIST | Admitting: SPECIALIST
Payer: COMMERCIAL

## 2021-08-02 DIAGNOSIS — R10.9 UNSPECIFIED ABDOMINAL PAIN: ICD-10-CM

## 2021-08-02 PROCEDURE — 74176 CT ABD & PELVIS W/O CONTRAST: CPT

## 2021-09-25 ENCOUNTER — HEALTH MAINTENANCE LETTER (OUTPATIENT)
Age: 51
End: 2021-09-25

## 2021-11-13 ENCOUNTER — OFFICE VISIT (OUTPATIENT)
Dept: FAMILY MEDICINE | Facility: CLINIC | Age: 51
End: 2021-11-13
Payer: COMMERCIAL

## 2021-11-13 ENCOUNTER — ANCILLARY PROCEDURE (OUTPATIENT)
Dept: GENERAL RADIOLOGY | Facility: CLINIC | Age: 51
End: 2021-11-13
Attending: STUDENT IN AN ORGANIZED HEALTH CARE EDUCATION/TRAINING PROGRAM
Payer: COMMERCIAL

## 2021-11-13 VITALS
RESPIRATION RATE: 16 BRPM | DIASTOLIC BLOOD PRESSURE: 80 MMHG | TEMPERATURE: 98.5 F | SYSTOLIC BLOOD PRESSURE: 140 MMHG | BODY MASS INDEX: 32.6 KG/M2 | HEART RATE: 67 BPM | OXYGEN SATURATION: 98 % | WEIGHT: 202 LBS

## 2021-11-13 DIAGNOSIS — S20.211A RIB CONTUSION, RIGHT, INITIAL ENCOUNTER: Primary | ICD-10-CM

## 2021-11-13 PROCEDURE — 99213 OFFICE O/P EST LOW 20 MIN: CPT | Performed by: STUDENT IN AN ORGANIZED HEALTH CARE EDUCATION/TRAINING PROGRAM

## 2021-11-13 PROCEDURE — 71101 X-RAY EXAM UNILAT RIBS/CHEST: CPT | Mod: TC | Performed by: RADIOLOGY

## 2021-11-13 RX ORDER — ONDANSETRON 4 MG/1
4 TABLET, ORALLY DISINTEGRATING ORAL
COMMUNITY
Start: 2021-03-05 | End: 2022-08-08

## 2021-11-13 RX ORDER — MENTHOL, METHYL SALICYLATE 63; 210 MG/1; MG/1
PATCH PERCUTANEOUS; TOPICAL; TRANSDERMAL
COMMUNITY
Start: 2021-06-14 | End: 2023-04-19

## 2021-11-13 RX ORDER — LIDOCAINE HYDROCHLORIDE 20 MG/ML
JELLY TOPICAL 2 TIMES DAILY
Qty: 30 ML | Refills: 1 | Status: SHIPPED | OUTPATIENT
Start: 2021-11-13 | End: 2022-08-08

## 2021-11-13 NOTE — PATIENT INSTRUCTIONS
Patient Education     Rib Bruise   A rib bruise (contusion) can affect one or more rib bones. It may cause pain, tenderness, swelling, and a purplish discoloration. There may be a sharp pain while breathing.   You will be assessed for other injuries. You will likely be given pain medicine. Bruised ribs heal on their own, without further treatment. But the pain may take weeks to months to go away.    Note that a small crack (fracture) in the rib may cause the same symptoms as a bruised rib. The small crack may not be seen on a chest X-ray. But the conditions are managed in the same way.   Home care    Rest. Don't do heavy lifting, strenuous exertion, or any activity that causes pain.    Ice the area to reduce pain and swelling. Put ice cubes in a plastic bag or use a cold pack. Wrap the cold source in a thin towel. Don't place it directly on your skin. Ice the injured area for 20 minutes every 1 to 2 hours the first day. Continue with ice packs 3 to 4 times a day for the next 2 days, then as needed for the relief of pain and swelling.    Take any prescribed pain medicine as directed by your healthcare provider. If none was prescribed, take acetaminophen, ibuprofen, or naproxen to control pain.    If you have a significant injury, you may be given a device called an incentive spirometer to keep your lungs healthy. Use as directed.    Follow-up care  Follow up with your healthcare provider, or as advised.   When to seek medical advice  Call your healthcare provider for any of the following:     Increasing chest pain with breathing    Coughing    New or worsening pain    Fever of 100.4 F (38 C) or higher, or as directed by your healthcare provider  Call 911  Call 911, or get medical care right away if any of the following occur:     Shortness of breath or trouble breathing    Dizziness, weakness, or fainting  [employer] last reviewed this educational content on 8/1/2019 2000-2021 [employer]. All rights  reserved. This information is not intended as a substitute for professional medical care. Always follow your healthcare professional's instructions.

## 2021-11-13 NOTE — PROGRESS NOTES
There are no exam notes on file for this visit.  Chief Complaint   Patient presents with     Chest Pain     injured right side of rib cage while getting out of bath tub x1 week ago     Blood pressure (!) 140/80, pulse 67, temperature 98.5  F (36.9  C), temperature source Oral, resp. rate 16, weight 91.6 kg (202 lb), SpO2 98 %, not currently breastfeeding.           SUBJECTIVE       Lawanda is a 51 year old  female with a PMH significant for:     Patient Active Problem List   Diagnosis     Hypertension goal BP (blood pressure) < 140/90     Recurrent cold sores     S/P hysterectomy     CARDIOVASCULAR SCREENING; LDL GOAL LESS THAN 160     Anxiety     Vitamin D deficiency     Pathological gambling     She presents with right-sided chest pain after mechanical fall while getting out of the bathtub 1 week ago on vacation.  States that her hand slipped and then subsequently collided with the bathtub on her right chest, her symptoms continued to be bothersome after 1 week.  Pain is worsened with certain positions, lifting heavy objects and deep inspiration.  Other than heat packs has not tried any therapies or over-the-counter pain medications.          OBJECTIVE     Vitals:    11/13/21 1332 11/13/21 1501   BP: (!) 173/114 (!) 140/80   BP Location: Left arm Left arm   Patient Position: Sitting Sitting   Cuff Size: Adult Large Adult Large   Pulse: 67    Resp: 16    Temp: 98.5  F (36.9  C)    TempSrc: Oral    SpO2: 98%    Weight: 91.6 kg (202 lb)      Body mass index is 32.6 kg/m .    Constitutional: Awake, alert, cooperative, no acute distress, and appears stated age.  Eyes: sclera clear, conjunctiva normal.  ENT: NC/AT. TM clear bilaterally. Tonsils nonerythematous and without exudates or trismus.  Chest: Generalized pain with palpation over the right chest, some point tenderness overlying ribs T9/T10 anteriorly.  Back: Symmetric, no curvature  Lungs: No increased WOB. CTABL, no crackles or wheezing  appreciated.  Cardiovascular: Appears well perfused. RRR, normal S1 and S2, no S3 or S4, and no murmur appreciated.  Abdomen: Normal BS, soft, non-distended, non-tender, no masses palpated  Musculoskeletal: No redness, warmth, or swelling of the joints. Tone is normal.  Neurologic: A&Ox3.  Cranial nerves II-XII are grossly intact.  Sensory is intact and gait is normal.  Neuropsychiatric: Normal affect, mood, orientation, memory and insight.  Skin: No visible rashes, erythema, pallor, petechia or purpura.    Results for orders placed or performed in visit on 11/13/21   XR Ribs & Chest Right G/E 3 Views     Status: None    Narrative    EXAM: XR RIBS and CHEST RT 3VW  LOCATION: [facility]  DATE/TIME: 11/13/2021 2:20 PM    INDICATION:  Rib contusion, pain  COMPARISON: None.      Impression    IMPRESSION: The left costophrenic angle is clipped on the PA view.    Lungs are clear. No evidence of a hydropneumothorax. Heart and pulmonary vascularity are normal.     Rib films obtained with a marker in place. No displaced fracture.       ASSESSMENT AND PLAN     Lawanda was seen today for chest pain.    Diagnoses and all orders for this visit:    Rib contusion, right, initial encounter  -     Cancel: XR Ribs & Chest Left G/E 3 Views  -     XR Ribs & Chest Right G/E 3 Views  -     lidocaine (XYLOCAINE) 2 % external gel; Apply topically 2 times daily  -     diclofenac (VOLTAREN) 1 % topical gel; Apply 2 g topically 4 times daily    No obvious rib fractures on dedicated plain films of rib, no concern for pneumonia or pneumothorax on chest x-ray. This is likely a bony contusion of the rib.  Discussed the possibility of an occult rib fracture, patient agreed to trial several agents for pain management occluding over-the-counter medications and topicals.  Discussed respiratory exercises to expand the lungs as best as possible, no incentive spirometry prescribed today.    Return if symptoms worsen or fail to  improve.    Tavo Anthony MD  11/13/2021

## 2022-01-09 ENCOUNTER — HEALTH MAINTENANCE LETTER (OUTPATIENT)
Age: 52
End: 2022-01-09

## 2022-02-01 ENCOUNTER — MYC MEDICAL ADVICE (OUTPATIENT)
Dept: FAMILY MEDICINE | Facility: CLINIC | Age: 52
End: 2022-02-01
Payer: COMMERCIAL

## 2022-04-07 ENCOUNTER — OFFICE VISIT (OUTPATIENT)
Dept: FAMILY MEDICINE | Facility: CLINIC | Age: 52
End: 2022-04-07
Payer: COMMERCIAL

## 2022-04-07 VITALS
OXYGEN SATURATION: 100 % | SYSTOLIC BLOOD PRESSURE: 147 MMHG | BODY MASS INDEX: 32.77 KG/M2 | RESPIRATION RATE: 16 BRPM | WEIGHT: 203 LBS | TEMPERATURE: 98 F | HEART RATE: 67 BPM | DIASTOLIC BLOOD PRESSURE: 90 MMHG

## 2022-04-07 DIAGNOSIS — J01.90 ACUTE SINUSITIS, RECURRENCE NOT SPECIFIED, UNSPECIFIED LOCATION: Primary | ICD-10-CM

## 2022-04-07 PROCEDURE — 99213 OFFICE O/P EST LOW 20 MIN: CPT | Performed by: PHYSICIAN ASSISTANT

## 2022-04-07 NOTE — PATIENT INSTRUCTIONS
Patient Education     Understanding Acute Rhinosinusitis  Acute rhinosinusitis is when the lining of the inside of the nose and the sinuses becomes irritated and swollen. It is also called sinusitis, or a sinus infection.  Sinuses are air-filled spaces in the skull behind the face. They are kept moist and clean by a lining of mucosa. Things such as pollen, smoke, and chemical fumes can irritate the mucosa. It can then swell up. As a response to irritation, the mucosa makes more mucus and other fluids. Tiny hairlike cilia cover the mucosa. Cilia help carry mucus toward the opening of the sinus. Too much mucus may cause the cilia to stop working. This blocks the sinus opening. A buildup of fluid in the sinuses then causes pain and pressure. It can also cause bacteria to grow in the sinuses.    What causes acute rhinosinusitis?  A sinus infection is most often caused by a virus. You are more likely to get one after having a cold or the flu. In some cases, a sinus infection can be caused by bacteria.  You are at higher risk for a sinus infection if you:    Are older in age    Have structural problems with your sinuses    Smoke or are exposed to secondhand smoke    Are exposed to changes in pressure, such as from flying a lot or deep sea diving    Have asthma or allergies    Have a weak immune system    Have dental disease     Symptoms of acute rhinosinusitis  Symptoms of acute rhinosinusitis often last around 7 to 10 days. If you have a bacterial infection, they may last longer. They may also get better but then worsen. You may have:    Face pain or pressure under the eyes and around the nose    Headache    Fluid draining in the back of the throat (postnasal drip)    Congestion    Drainage that is thick and colored (often green), instead of clear    Cough    Problems with your sense of smell    Ear pain or hearing problems    Fever    Tooth pain    Fatigue  Diagnosing acute rhinosinusitis  Your healthcare provider will  ask about your symptoms and past health. He or she will look at your ears, nose, throat, and sinuses. Imaging tests, such as X-rays, are often not needed.  It can be hard to figure out if a sinus infection is caused by a virus or bacterium. A bacterial infection tends to last longer. Symptoms may also get better but then worsen. Your healthcare provider may take a sample of mucus from your nose to check for bacteria.  Treating acute rhinosinusitis  Most sinus infections will go away within 10 days. Your body will fight off the virus. If your symptoms seem to get better but then worsen, you may have a bacterial infection instead. Your healthcare provider will then give you antibiotics. Take this medicine until it is gone, even if you feel better.  To help ease your symptoms, your healthcare provider may advise:    Over-the-counter pain relievers. Medicines such as acetaminophen or ibuprofen can ease sinus pain. They may also lower a fever.    Nasal washes. Washing your nasal passages with salt water may ease pain and pressure. It can rinse out mucous and other irritants from your sinuses. Your healthcare provider can show you how to do it.    Nasal steroid spray. This prescription medicine can reduce inflammation in your sinuses.    Other medicines. Decongestants, antihistamines, and other nasal sprays may give short-term relief. They may help with congestion. Talk with your healthcare provider before taking these medicines.     Preventing acute rhinosinusitis  You can help prevent a sinus infection with these steps:    Wash your hands well and often.    Stay away from people who have a cold or upper respiratory infection.    Don't smoke. And stay away from secondhand smoke.    Use a humidifier at home.    Make sure you are up-to-date on your vaccines, such as the flu shot.     When to call your healthcare provider  Call your healthcare provider right away if you have any of these:    Fever of 100.4 F (38 C) or  higher, or as directed by your healthcare provider    Pain that gets worse    Symptoms that don t get better, or get worse    New symptoms  Renetta last reviewed this educational content on 6/1/2019 2000-2021 [employer]. All rights reserved. This information is not intended as a substitute for professional medical care. Always follow your healthcare professional's instructions.

## 2022-04-07 NOTE — PROGRESS NOTES
Assessment & Plan     Acute sinusitis, recurrence not specified, unspecified location   Push fluids, rest and ibuprofen or tylenol for comfort.    Nasal saline.    augmentin as ordered.    RTC for persistent or worsening sx.   PI given and disucssed.      - amoxicillin-clavulanate (AUGMENTIN) 875-125 MG tablet  Dispense: 20 tablet; Refill: 0       Return if symptoms worsen or fail to improve, for Follow up in 1 week if not improved.    Nurys Corrales PA-C  [facility]    Fredi Webber is a 52 year old female who presents to clinic today for the following health issues:  Chief Complaint   Patient presents with     Sinus Problem     sinus congestion headache for 2 weeks     HPI    2+ weeks of uri sx, congestion, green discharge.  No sob, difficulty breathing.    Forehead pain and cheeks tender.    Neck discomfort.   No fevers.    No hx of sinusitis, surgery or allergies.    Did have a sinus infection in June last year.    OTC medications not helpful. Tyelnol sinus severe.        Review of Systems  Constitutional, HEENT, cardiovascular, pulmonary, gi and gu systems are negative, except as otherwise noted.      Objective    BP (!) 147/90   Pulse 67   Temp 98  F (36.7  C) (Oral)   Resp 16   Wt 92.1 kg (203 lb)   SpO2 100%   BMI 32.77 kg/m    Physical Exam   Pt is in no acute distress and appears well  Ears patent B:  TM s intact, non-injected. All land marks easily visibile    Nasal mucosa is non-edematous, no discharge.    Pharynx: non erythematous, tonsils non hypertrophied, No exudate   Neck supple: no adenopathy  Lungs: CTA  Heart: RRR, no murmur, no thrills or heaves   Ext: no edema  Skin: no rashes    TTP of the maxillary and frontal sinuses.

## 2022-05-01 ENCOUNTER — HEALTH MAINTENANCE LETTER (OUTPATIENT)
Age: 52
End: 2022-05-01

## 2022-06-15 ENCOUNTER — OFFICE VISIT (OUTPATIENT)
Dept: FAMILY MEDICINE | Facility: CLINIC | Age: 52
End: 2022-06-15
Payer: COMMERCIAL

## 2022-06-15 VITALS
WEIGHT: 202 LBS | SYSTOLIC BLOOD PRESSURE: 161 MMHG | HEART RATE: 69 BPM | RESPIRATION RATE: 16 BRPM | TEMPERATURE: 97.4 F | BODY MASS INDEX: 32.6 KG/M2 | OXYGEN SATURATION: 98 % | DIASTOLIC BLOOD PRESSURE: 100 MMHG

## 2022-06-15 DIAGNOSIS — T14.8XXA MUSCLE STRAIN: Primary | ICD-10-CM

## 2022-06-15 PROCEDURE — 99213 OFFICE O/P EST LOW 20 MIN: CPT | Performed by: PHYSICIAN ASSISTANT

## 2022-06-15 RX ORDER — CYCLOBENZAPRINE HCL 5 MG
10 TABLET ORAL 3 TIMES DAILY PRN
Qty: 30 TABLET | Refills: 0 | Status: SHIPPED | OUTPATIENT
Start: 2022-06-15 | End: 2022-08-08

## 2022-06-15 NOTE — LETTER
[facility]  [address] [address]  Phone: [phone]  Fax: [phone]    Claudia 15, 2022        Lawanda Katz  [address]  [place] [address]          To whom it may concern:    RE: Lawanda Katz    She is excused from work for 6/15/2022 - 6/22/2022.  May return sooner as tolerated, and may also work half days as needed.    Please contact me for questions or concerns.      Sincerely,        Shy Recio PA-C

## 2022-06-15 NOTE — PROGRESS NOTES
Assessment & Plan:      Problem List Items Addressed This Visit    None     Visit Diagnoses     Muscle strain    -  Primary    Relevant Medications    cyclobenzaprine (FLEXERIL) 5 MG tablet        Medical Decision Making  Patient presents with acute onset left-sided neck and shoulder pains.  Physical exam is consistent with a muscle strain.  Recommend rest, warm compresses, over-the-counter analgesics, and muscle relaxer.  Provided a note for work.  Discussed signs of worsening symptoms and when to follow-up with PCP if no symptom improvement.     Subjective:      Lawanda Katz is a 52 year old female here for evaluation of left-sided neck and shoulder pains.  Onset of symptoms was 1 to 2 days ago.  Patient does not recall any specific injury or trauma to the neck.  Pain is improved when her left arm is elevated and pain worsens when her left arm is down.  Pain does keep patient awake at nighttime.  Pain is described as a stiffness.  Patient tried a dose of Flexeril with no significant improvement.  Warm compresses did help a little.  She has not tried Tylenol or ibuprofen.     The following portions of the patient's history were reviewed and updated as appropriate: allergies, current medications, and problem list.     Review of Systems  Pertinent items are noted in HPI.    Allergies  Allergies   Allergen Reactions     Hydrochlorothiazide Nausea and Diarrhea     Metronidazole      Wellbutrin [Bupropion]      Tingling sensation       Morphine Rash       Family History   Problem Relation Age of Onset     Heart Disease Mother      Unknown/Adopted No family hx of        Social History     Tobacco Use     Smoking status: Never Smoker     Smokeless tobacco: Never Used   Substance Use Topics     Alcohol use: No     Alcohol/week: 0.0 standard drinks        Objective:      BP (!) 164/100   Pulse 71   Temp 97.4  F (36.3  C) (Oral)   Resp 16   Wt 91.6 kg (202 lb)   SpO2 99%   BMI 32.60 kg/m    General appearance -  alert, well appearing, and in no distress and non-toxic  Neck - No midline spinal tenderness, no tenderness to the cervical paraspinal muscles  Back - tenderness to the superior, medial aspect of the left scapula muscles  Skin - normal coloration and turgor, no rashes, no suspicious skin lesions noted     Lab & Imaging Results    No results found for any visits on 06/15/22.    I personally reviewed these results and discussed findings with the patient.    The use of Dragon/Splash[employer]FM dictation services was used to construct the content of this note; any grammatical errors are non-intentional. Please contact the author directly if you are in need of any clarification.

## 2022-06-15 NOTE — PATIENT INSTRUCTIONS
You were seen today for a muscle strain.    Symptom management:  - Take the flexeril to relax the muscle, start with just 5 mg, if you tolerate that alright may take up to 10 mg at a time up to 3 times a day as needed.  - May use acetaminophen 500-1000 mg for first 3 hours (not to exceed 4000 mg in one day), then ibuprofen 400-600 mg with food for the next 3 hours, and alternate as needed  - Heat pads as tolerated  - Rest with occasional light stretching    Reasons to be seen for re-evaluation:  - Develop worsening numbness or tingling in the hand and fingers  - Sharp shooting pain that radiates from the injury  - Worsening swelling or bruising develops  - Reduced strength of muscles  - Loss of bowel or bladder function  - Fever of 100.4 or higher    Otherwise, if pain is not improving after 1 week, return for re-evaluation or see your primary care provider.

## 2022-08-08 ENCOUNTER — MYC MEDICAL ADVICE (OUTPATIENT)
Dept: INTERNAL MEDICINE | Facility: CLINIC | Age: 52
End: 2022-08-08

## 2022-08-08 ENCOUNTER — OFFICE VISIT (OUTPATIENT)
Dept: INTERNAL MEDICINE | Facility: CLINIC | Age: 52
End: 2022-08-08
Payer: COMMERCIAL

## 2022-08-08 VITALS
BODY MASS INDEX: 32.61 KG/M2 | DIASTOLIC BLOOD PRESSURE: 88 MMHG | SYSTOLIC BLOOD PRESSURE: 128 MMHG | HEIGHT: 66 IN | HEART RATE: 80 BPM | WEIGHT: 202.9 LBS | OXYGEN SATURATION: 98 %

## 2022-08-08 DIAGNOSIS — E66.09 CLASS 1 OBESITY DUE TO EXCESS CALORIES WITH SERIOUS COMORBIDITY AND BODY MASS INDEX (BMI) OF 33.0 TO 33.9 IN ADULT: ICD-10-CM

## 2022-08-08 DIAGNOSIS — Z13.21 SCREENING FOR ENDOCRINE, NUTRITIONAL, METABOLIC AND IMMUNITY DISORDER: ICD-10-CM

## 2022-08-08 DIAGNOSIS — Z13.0 SCREENING FOR ENDOCRINE, NUTRITIONAL, METABOLIC AND IMMUNITY DISORDER: ICD-10-CM

## 2022-08-08 DIAGNOSIS — Z13.228 SCREENING FOR ENDOCRINE, NUTRITIONAL, METABOLIC AND IMMUNITY DISORDER: ICD-10-CM

## 2022-08-08 DIAGNOSIS — Z12.11 SCREEN FOR COLON CANCER: ICD-10-CM

## 2022-08-08 DIAGNOSIS — Z12.31 VISIT FOR SCREENING MAMMOGRAM: ICD-10-CM

## 2022-08-08 DIAGNOSIS — Z76.89 ENCOUNTER TO ESTABLISH CARE: ICD-10-CM

## 2022-08-08 DIAGNOSIS — F41.9 ANXIETY: ICD-10-CM

## 2022-08-08 DIAGNOSIS — Z13.220 SCREENING FOR HYPERLIPIDEMIA: ICD-10-CM

## 2022-08-08 DIAGNOSIS — Z72.6 GAMBLING: ICD-10-CM

## 2022-08-08 DIAGNOSIS — I10 PRIMARY HYPERTENSION: ICD-10-CM

## 2022-08-08 DIAGNOSIS — F32.5 MAJOR DEPRESSIVE DISORDER WITH SINGLE EPISODE, IN FULL REMISSION (H): ICD-10-CM

## 2022-08-08 DIAGNOSIS — Z00.00 ENCOUNTER FOR ANNUAL PHYSICAL EXAM: ICD-10-CM

## 2022-08-08 DIAGNOSIS — E66.811 CLASS 1 OBESITY DUE TO EXCESS CALORIES WITH SERIOUS COMORBIDITY AND BODY MASS INDEX (BMI) OF 33.0 TO 33.9 IN ADULT: ICD-10-CM

## 2022-08-08 DIAGNOSIS — Z13.29 SCREENING FOR ENDOCRINE, NUTRITIONAL, METABOLIC AND IMMUNITY DISORDER: ICD-10-CM

## 2022-08-08 PROBLEM — E66.9 OBESITY (BMI 30-39.9): Status: ACTIVE | Noted: 2018-04-25

## 2022-08-08 PROBLEM — N20.1 RIGHT URETERAL STONE: Status: ACTIVE | Noted: 2021-01-19

## 2022-08-08 LAB
ALBUMIN SERPL BCG-MCNC: 4.3 G/DL (ref 3.5–5.2)
ALP SERPL-CCNC: 91 U/L (ref 35–104)
ALT SERPL W P-5'-P-CCNC: 17 U/L (ref 10–35)
ANION GAP SERPL CALCULATED.3IONS-SCNC: 10 MMOL/L (ref 7–15)
AST SERPL W P-5'-P-CCNC: 23 U/L (ref 10–35)
BASOPHILS # BLD AUTO: 0 10E3/UL (ref 0–0.2)
BASOPHILS NFR BLD AUTO: 1 %
BILIRUB SERPL-MCNC: 0.5 MG/DL
BUN SERPL-MCNC: 11.5 MG/DL (ref 6–20)
CALCIUM SERPL-MCNC: 9.5 MG/DL (ref 8.6–10)
CHLORIDE SERPL-SCNC: 104 MMOL/L (ref 98–107)
CHOLEST SERPL-MCNC: 182 MG/DL
CREAT SERPL-MCNC: 0.88 MG/DL (ref 0.51–0.95)
DEPRECATED HCO3 PLAS-SCNC: 27 MMOL/L (ref 22–29)
EOSINOPHIL # BLD AUTO: 0 10E3/UL (ref 0–0.7)
EOSINOPHIL NFR BLD AUTO: 0 %
ERYTHROCYTE [DISTWIDTH] IN BLOOD BY AUTOMATED COUNT: 12.6 % (ref 10–15)
GFR SERPL CREATININE-BSD FRML MDRD: 79 ML/MIN/1.73M2
GLUCOSE SERPL-MCNC: 98 MG/DL (ref 70–99)
HCT VFR BLD AUTO: 40.5 % (ref 35–47)
HDLC SERPL-MCNC: 50 MG/DL
HGB BLD-MCNC: 13.5 G/DL (ref 11.7–15.7)
IMM GRANULOCYTES # BLD: 0 10E3/UL
IMM GRANULOCYTES NFR BLD: 0 %
LDLC SERPL CALC-MCNC: 119 MG/DL
LYMPHOCYTES # BLD AUTO: 1.4 10E3/UL (ref 0.8–5.3)
LYMPHOCYTES NFR BLD AUTO: 36 %
MCH RBC QN AUTO: 25.8 PG (ref 26.5–33)
MCHC RBC AUTO-ENTMCNC: 33.3 G/DL (ref 31.5–36.5)
MCV RBC AUTO: 77 FL (ref 78–100)
MONOCYTES # BLD AUTO: 0.3 10E3/UL (ref 0–1.3)
MONOCYTES NFR BLD AUTO: 7 %
NEUTROPHILS # BLD AUTO: 2.1 10E3/UL (ref 1.6–8.3)
NEUTROPHILS NFR BLD AUTO: 56 %
NONHDLC SERPL-MCNC: 132 MG/DL
PLATELET # BLD AUTO: 160 10E3/UL (ref 150–450)
POTASSIUM SERPL-SCNC: 4.7 MMOL/L (ref 3.4–5.3)
PROT SERPL-MCNC: 7.2 G/DL (ref 6.4–8.3)
RBC # BLD AUTO: 5.24 10E6/UL (ref 3.8–5.2)
SODIUM SERPL-SCNC: 141 MMOL/L (ref 136–145)
TRIGL SERPL-MCNC: 64 MG/DL
WBC # BLD AUTO: 3.7 10E3/UL (ref 4–11)

## 2022-08-08 PROCEDURE — 90715 TDAP VACCINE 7 YRS/> IM: CPT | Performed by: NURSE PRACTITIONER

## 2022-08-08 PROCEDURE — 90471 IMMUNIZATION ADMIN: CPT | Performed by: NURSE PRACTITIONER

## 2022-08-08 PROCEDURE — 80053 COMPREHEN METABOLIC PANEL: CPT | Performed by: NURSE PRACTITIONER

## 2022-08-08 PROCEDURE — 80061 LIPID PANEL: CPT | Performed by: NURSE PRACTITIONER

## 2022-08-08 PROCEDURE — 85025 COMPLETE CBC W/AUTO DIFF WBC: CPT | Performed by: NURSE PRACTITIONER

## 2022-08-08 PROCEDURE — 99214 OFFICE O/P EST MOD 30 MIN: CPT | Mod: 25 | Performed by: NURSE PRACTITIONER

## 2022-08-08 PROCEDURE — 99396 PREV VISIT EST AGE 40-64: CPT | Mod: 25 | Performed by: NURSE PRACTITIONER

## 2022-08-08 PROCEDURE — 96127 BRIEF EMOTIONAL/BEHAV ASSMT: CPT | Performed by: NURSE PRACTITIONER

## 2022-08-08 PROCEDURE — 36415 COLL VENOUS BLD VENIPUNCTURE: CPT | Performed by: NURSE PRACTITIONER

## 2022-08-08 RX ORDER — CYCLOBENZAPRINE HCL 10 MG
10 TABLET ORAL 3 TIMES DAILY PRN
COMMUNITY
Start: 2022-06-09 | End: 2022-11-17

## 2022-08-08 RX ORDER — AMLODIPINE AND BENAZEPRIL HYDROCHLORIDE 5; 40 MG/1; MG/1
1 CAPSULE ORAL DAILY
COMMUNITY
Start: 2022-06-11 | End: 2022-08-08

## 2022-08-08 RX ORDER — AMLODIPINE AND BENAZEPRIL HYDROCHLORIDE 5; 40 MG/1; MG/1
1 CAPSULE ORAL DAILY
Qty: 90 CAPSULE | Refills: 3 | Status: SHIPPED | OUTPATIENT
Start: 2022-08-08 | End: 2023-04-19

## 2022-08-08 ASSESSMENT — ANXIETY QUESTIONNAIRES
GAD7 TOTAL SCORE: 15
6. BECOMING EASILY ANNOYED OR IRRITABLE: NEARLY EVERY DAY
GAD7 TOTAL SCORE: 15
IF YOU CHECKED OFF ANY PROBLEMS ON THIS QUESTIONNAIRE, HOW DIFFICULT HAVE THESE PROBLEMS MADE IT FOR YOU TO DO YOUR WORK, TAKE CARE OF THINGS AT HOME, OR GET ALONG WITH OTHER PEOPLE: VERY DIFFICULT
7. FEELING AFRAID AS IF SOMETHING AWFUL MIGHT HAPPEN: NOT AT ALL
2. NOT BEING ABLE TO STOP OR CONTROL WORRYING: MORE THAN HALF THE DAYS
3. WORRYING TOO MUCH ABOUT DIFFERENT THINGS: NEARLY EVERY DAY
1. FEELING NERVOUS, ANXIOUS, OR ON EDGE: NEARLY EVERY DAY
5. BEING SO RESTLESS THAT IT IS HARD TO SIT STILL: MORE THAN HALF THE DAYS

## 2022-08-08 ASSESSMENT — PATIENT HEALTH QUESTIONNAIRE - PHQ9
5. POOR APPETITE OR OVEREATING: MORE THAN HALF THE DAYS
SUM OF ALL RESPONSES TO PHQ QUESTIONS 1-9: 0

## 2022-08-08 NOTE — PATIENT INSTRUCTIONS
Your labs are processing, I will release results on my chart once they are back.    Start drinking water, goals are 64 ounces per day.    Try to eat more fruits, veggies, and good low fat protein sources.    Start exercising.    Follow up in 6 months for a recheck, before then if anything comes up.    Scheduling will reach out to you about the colonoscopy.    Call [phone] to set up your mammogram.

## 2022-08-08 NOTE — PROGRESS NOTES
SUBJECTIVE:   CC: Lawanda Katz is an 52 year old woman who presents for preventive health visit.     Patient has been advised of split billing requirements and indicates understanding: Yes  The patient presents today for her annual physical and to establish care. She is fasted today.    Reviewed her past surgical history with her today.    She reports overall doing well.    She has a long standing history of anxiety and depression. She continues to take hydroxyzine as needed for her anxiety, she has tried and failed many mental health medications including sertraline, wellbutrin, and buspar. She feels like her anxiety has been controlled.    She will reach out to the [employer] program for counseling.    She reports a history of gambling. She has been to gambling anonymous. She is not having issues with this currently. She does report that she works a lot to keep her mind off of gambling.    She is at low risk of hepatitis C and HIV, will discontinue this.    She is , has two daughters who are doing well. She is adopted, does not know her medical history.    She is not exercising.    She would like her Tdap booster today.     She is not exercising. She denies any drug, alcohol, or tobacco use.     She is due for a follow up colonoscopy. Her first was done when she was in her 20's, she used to have IBS. She also is due for a follow up Mammogram.    She does reports that she drinks about 32 ounces of mountain dew per day, not a lot of water. She is concerned about her blood sugar.    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       Today's PHQ-2 Score:   PHQ-2 ( 1999 Pfizer) 8/7/2022   Q1: Little interest or pleasure in doing things 0   Q2: Feeling down, depressed or hopeless 0   PHQ-2 Score 0   Q1: Little interest or pleasure in doing things Not at all   Q2: Feeling down, depressed or hopeless Not at all   PHQ-2 Score 0       Abuse: Current or Past (Physical, Sexual or Emotional) - No  Do you feel  safe in your environment? Yes    Have you ever done Advance Care Planning? (For example, a Health Directive, POLST, or a discussion with a medical provider or your loved ones about your wishes): No, advance care planning information given to patient to review.  Patient plans to discuss their wishes with loved ones or provider.      Social History     Tobacco Use     Smoking status: Never Smoker     Smokeless tobacco: Never Used   Substance Use Topics     Alcohol use: No     Alcohol/week: 0.0 standard drinks     Reviewed orders with patient.  Reviewed health maintenance and updated orders accordingly - Yes  Lab work is in process    Breast Cancer Screening:    Mammogram Screening: Recommended annual mammography  Pertinent mammograms are reviewed under the imaging tab.    PAP: Status post hysterectomy. PAP's are no longer indicated.    Reviewed and updated as needed this visit by clinical staff   Tobacco  Allergies  Meds    Surg Hx             Reviewed and updated as needed this visit by Provider        Surg Hx            Past Medical History:   Diagnosis Date     Anxiety      Depression      Hypertension       Past Surgical History:   Procedure Laterality Date     ADENOIDECTOMY       CYSTOSCOPY  2007     HYSTERECTOMY, PAP NO LONGER INDICATED  2010    BSO, cervix removed, for dysmenorrhea     TUBAL LIGATION  1999      BREAST BIOPSY LT  1/2013    left, benign       Review of Systems  CONSTITUTIONAL: NEGATIVE for fever, chills, change in weight  INTEGUMENTARY/SKIN: NEGATIVE for worrisome rashes, moles or lesions  EYES: NEGATIVE for vision changes or irritation  ENT: NEGATIVE for ear, mouth and throat problems  RESP: NEGATIVE for significant cough or SOB  BREAST: NEGATIVE for masses, tenderness or discharge  CV: NEGATIVE for chest pain, palpitations or peripheral edema  GI: NEGATIVE for nausea, abdominal pain, heartburn, or change in bowel habits  : NEGATIVE for unusual urinary or vaginal symptoms. No vaginal  "bleeding.  MUSCULOSKELETAL: NEGATIVE for significant arthralgias or myalgia  NEURO: NEGATIVE for weakness, dizziness or paresthesias  PSYCHIATRIC: NEGATIVE for changes in mood or affect      OBJECTIVE:   /88 (BP Location: Right arm, Patient Position: Sitting, Cuff Size: Adult Regular)   Pulse 80   Ht 1.664 m (5' 5.5\")   Wt 92 kg (202 lb 14.4 oz)   SpO2 98%   BMI 33.25 kg/m    Physical Exam  GENERAL APPEARANCE: healthy, alert and no distress  EYES: Eyes grossly normal to inspection, PERRL and conjunctivae and sclerae normal  HENT: ear canals and TM's normal, nose and mouth without ulcers or lesions, oropharynx clear and oral mucous membranes moist  NECK: no adenopathy, no asymmetry, masses, or scars and thyroid normal to palpation  RESP: lungs clear to auscultation - no rales, rhonchi or wheezes  CV: regular rate and rhythm, normal S1 S2, no S3 or S4, no murmur, click or rub, no peripheral edema and peripheral pulses strong  ABDOMEN: soft, nontender, no hepatosplenomegaly, no masses and bowel sounds normal  MS: no musculoskeletal defects are noted and gait is age appropriate without ataxia  SKIN: no suspicious lesions or rashes  NEURO: Normal strength and tone, sensory exam grossly normal, mentation intact and speech normal  PSYCH: mentation appears normal and affect normal/bright    Diagnostic Test Results: Mammogram from 2018.   Labs reviewed in Epic    ASSESSMENT/PLAN:   Lawanda was seen today for establish care.    Diagnoses and all orders for this visit:    Encounter for annual physical exam: Completed today. Fasted labs drawn. TDAP given.     Encounter to establish care: Completed today.     Primary hypertension: Blood pressure today in clinic was 128/88. Refilled her medication.   -     amLODIPine-benazepril (LOTREL) 5-40 MG capsule; Take 1 capsule by mouth daily    Anxiety: PARKER-7 score today was a 15. She has tried and failed multiple longer term medications in the past. Discussed possible gene " "sight testing going forward, she will consider this. She continues on hydroxyzine PRN. Discussed contacting the [facility] program for counseling.     Major depressive disorder with single episode, in full remission (H): PHQ-9 score today was a zero. Stable.     Gambling: Hx of gambling. She has been to [facility]. Is currently not a problem. Stable.     Class 1 obesity due to excess calories with serious comorbidity and body mass index (BMI) of 33.0 to 33.9 in adult: BMI today was 33.25. Discussed diet and exercise, drinking water, trying to cut out Mountain Dew.     Screening for endocrine, nutritional, metabolic and immunity disorder  -     CBC with platelets and differential; Future  -     Comprehensive metabolic panel (BMP + Alb, Alk Phos, ALT, AST, Total. Bili, TP); Future    Screening for hyperlipidemia  -     Lipid panel reflex to direct LDL Non-fasting    Screen for colon cancer  -     Colonscopy Screening  Referral; Future    Visit for screening mammogram  -     MA SCREENING DIGITAL BILAT - Future  (s+30); Future    Other orders  -     REVIEW OF HEALTH MAINTENANCE PROTOCOL ORDERS  -     TDAP VACCINE (Adacel, Boostrix)    Patient has been advised of split billing requirements and indicates understanding: Yes    COUNSELING:  Reviewed preventive health counseling, as reflected in patient instructions  Special attention given to:        Regular exercise       Healthy diet/nutrition       Vision screening    Estimated body mass index is 33.25 kg/m  as calculated from the following:    Height as of this encounter: 1.664 m (5' 5.5\").    Weight as of this encounter: 92 kg (202 lb 14.4 oz).    Weight management plan: Discussed healthy diet and exercise guidelines    She reports that she has never smoked. She has never used smokeless tobacco.      Counseling Resources:  ATP IV Guidelines  Pooled Cohorts Equation Calculator  Breast Cancer Risk Calculator  BRCA-Related Cancer Risk Assessment: FHS-7 " Tool  FRAX Risk Assessment  ICSI Preventive Guidelines  Dietary Guidelines for Americans, 2010  USDA's MyPlate  ASA Prophylaxis  Lung CA Screening    Alexa Terrell CNP  [facility]

## 2022-08-08 NOTE — PROGRESS NOTES
"  {PROVIDER CHARTING PREFERENCE:327164}    Subjective   Sujatha is a 52 year old{ACCOMPANIED BY STATEMENT (Optional):094601}, presenting for the following health issues:  Establish Care      HPI     {SUPERLIST (Optional):555403}  {additonal problems for provider to add (Optional):159137}    Review of Systems   {ROS COMP (Optional):876703}      Objective    /88 (BP Location: Right arm, Patient Position: Sitting, Cuff Size: Adult Regular)   Pulse 80   Ht 1.664 m (5' 5.5\")   Wt 92 kg (202 lb 14.4 oz)   SpO2 98%   BMI 33.25 kg/m    Body mass index is 33.25 kg/m .  Physical Exam   {Exam List (Optional):770222}    {Diagnostic Test Results (Optional):238322}    {AMBULATORY ATTESTATION (Optional):192160}            .  ..  "

## 2022-08-08 NOTE — TELEPHONE ENCOUNTER
Please see MyChart from patient needing PCP review.     Labs resulted this morning. I discussed with patient that you have note review yet and she will be able to see your result notes via MyChart.     Sharla Moya RN  [facility]

## 2022-08-11 ENCOUNTER — OFFICE VISIT (OUTPATIENT)
Dept: FAMILY MEDICINE | Facility: CLINIC | Age: 52
End: 2022-08-11
Payer: COMMERCIAL

## 2022-08-11 VITALS — HEART RATE: 69 BPM | SYSTOLIC BLOOD PRESSURE: 164 MMHG | OXYGEN SATURATION: 99 % | DIASTOLIC BLOOD PRESSURE: 98 MMHG

## 2022-08-11 DIAGNOSIS — L03.114 CELLULITIS OF LEFT UPPER EXTREMITY: Primary | ICD-10-CM

## 2022-08-11 DIAGNOSIS — T50.A95A LOCAL REACTION TO TETANUS VACCINE, INITIAL ENCOUNTER: ICD-10-CM

## 2022-08-11 PROCEDURE — 99214 OFFICE O/P EST MOD 30 MIN: CPT | Performed by: FAMILY MEDICINE

## 2022-08-11 RX ORDER — DOXYCYCLINE HYCLATE 100 MG
100 TABLET ORAL 2 TIMES DAILY
Qty: 20 TABLET | Refills: 0 | Status: SHIPPED | OUTPATIENT
Start: 2022-08-11 | End: 2022-08-21

## 2022-08-11 NOTE — PATIENT INSTRUCTIONS
Start doxycycline 2 times a day for 10 days always take with food to prevent nausea vomiting even the pharmacist tells you not to take it with food     Claritin ( loratidine) 10 mg daily until redness is nearly resolved    Benadryl (diphenhydramine) 25 mg at bedtime -will make sleepy    If fever - to ER    If increase redness/pain return to clinic

## 2022-08-11 NOTE — PROGRESS NOTES
Patient presents with:  Derm Problem: Redness on after getting Tdap       Subjective     Lawandacedrick Katz is a 52 year old female who presents to clinic today for the following health issues:    HPI     Rasha- after tdap vaccine       Duration:  4 days ago-8/8/2022 -tdap vaccine     Description  Location: left upper arm  Itching: no  Aching at site the first night after tdap vaccine -now resolved   Yesterday increase in redness/size  Area circled and redness is expanding     Intensity:  moderate    Accompanying signs and symptoms: None- no fever     History (similar episodes/previous evaluation): None    Precipitating or alleviating factors:  New exposures:  TDAP vaccine as noted   Recent travel: no      Therapies tried and outcome: none      Past Medical History:   Diagnosis Date     Anxiety      Depression      Hypertension      Social History     Tobacco Use     Smoking status: Never Smoker     Smokeless tobacco: Never Used   Substance Use Topics     Alcohol use: No     Alcohol/week: 0.0 standard drinks       Current Outpatient Medications   Medication Sig Dispense Refill     amLODIPine-benazepril (LOTREL) 5-40 MG capsule Take 1 capsule by mouth daily 90 capsule 3     cyclobenzaprine (FLEXERIL) 10 MG tablet Take 10 mg by mouth 3 times daily as needed       hydrOXYzine (ATARAX) 25 MG tablet Take 1-2 tablets (25-50 mg) by mouth every 6 hours as needed for anxiety 60 tablet 0     Menthol-Methyl Salicylate (SALONPAS PAIN RELIEF PATCH) PTCH Apply 1 patch to affected area 2 times a day as needed       valACYclovir (VALTREX) 1000 mg tablet Take 1 tablet (1,000 mg) by mouth daily 90 tablet 0     venlafaxine (EFFEXOR-ER) 37.5 MG 24 hr tablet Take 1 tablet (37.5 mg) by mouth daily 90 tablet 0     Allergies   Allergen Reactions     Hydrochlorothiazide Nausea and Diarrhea     Metronidazole      Wellbutrin [Bupropion]      Tingling sensation       Morphine Rash             ROS are negative, except as otherwise noted HPI       Objective    BP (!) 164/98 (BP Location: Right arm, Patient Position: Sitting, Cuff Size: Adult Large)   Pulse 69   SpO2 99%   There is no height or weight on file to calculate BMI.  Physical Exam   GENERAL: healthy, alert and no distress  MS: no gross musculoskeletal defects noted, no edema  SKIN: left upper arm-circular area of erythema upper arm, tender to palpation, pain in area with ROM of arm,-area circled with pen, erythema now extending past area circled   NEURO: Normal strength and tone, mentation intact and speech normal      Diagnostic Test Results:  Labs reviewed in Epic  No results found for any visits on 08/11/22.          ASSESSMENT/PLAN:      ICD-10-CM    1. Cellulitis of left upper extremity  L03.114 doxycycline hyclate (VIBRA-TABS) 100 MG tablet   2. Local reaction to tetanus vaccine, initial encounter  T50.A95A     Tdap vaccine-8/8/2022              Reviewed medication instructions and side effects. Follow up if experiences side effects.     I reviewed supportive care, otc meds to use if needed, expected course, and signs of concern.  Follow up as needed or if she does not improve within  1-2 days or if worsens in any way.  Reviewed red flag symptoms and is to go to the ER if experiences any of these.     The use of Dragon/[employer] dictation services may have been used to construct the content in this note; any grammatical or spelling errors are non-intentional. Please contact the author of this note directly if you are in need of any clarification.        Patient Instructions   Start doxycycline 2 times a day for 10 days always take with food to prevent nausea vomiting even the pharmacist tells you not to take it with food     Claritin ( loratidine) 10 mg daily until redness is nearly resolved    Benadryl (diphenhydramine) 25 mg at bedtime -will make sleepy    If fever - to ER    If increase redness/pain return to clinic

## 2022-08-28 ENCOUNTER — MYC MEDICAL ADVICE (OUTPATIENT)
Dept: INTERNAL MEDICINE | Facility: CLINIC | Age: 52
End: 2022-08-28

## 2022-08-28 DIAGNOSIS — N95.1 MENOPAUSAL SYNDROME (HOT FLASHES): Primary | ICD-10-CM

## 2022-08-31 RX ORDER — VENLAFAXINE HYDROCHLORIDE 37.5 MG/1
37.5 TABLET, EXTENDED RELEASE ORAL DAILY
Qty: 90 TABLET | Refills: 0 | Status: SHIPPED | OUTPATIENT
Start: 2022-08-31 | End: 2023-04-19

## 2022-10-15 ENCOUNTER — IMMUNIZATION (OUTPATIENT)
Dept: FAMILY MEDICINE | Facility: CLINIC | Age: 52
End: 2022-10-15
Payer: COMMERCIAL

## 2022-10-15 PROCEDURE — 91312 COVID-19,PF,PFIZER BOOSTER BIVALENT: CPT

## 2022-10-15 PROCEDURE — 0124A COVID-19,PF,PFIZER BOOSTER BIVALENT: CPT

## 2022-10-17 ENCOUNTER — MYC MEDICAL ADVICE (OUTPATIENT)
Dept: INTERNAL MEDICINE | Facility: CLINIC | Age: 52
End: 2022-10-17

## 2022-10-17 DIAGNOSIS — F41.1 ANXIETY REACTION: Primary | ICD-10-CM

## 2022-10-18 RX ORDER — LORAZEPAM 0.5 MG/1
TABLET ORAL
Qty: 20 TABLET | Refills: 0 | Status: SHIPPED | OUTPATIENT
Start: 2022-10-18 | End: 2022-10-18

## 2022-10-18 RX ORDER — LORAZEPAM 0.5 MG/1
TABLET ORAL
Qty: 20 TABLET | Refills: 0 | Status: SHIPPED | OUTPATIENT
Start: 2022-10-18 | End: 2023-03-29

## 2022-10-18 NOTE — TELEPHONE ENCOUNTER
"10-18-22  [facility] \"inpatient\" pharmacy called stated they received a request for:  LORazepam (ATIVAN) 0.5 MG tablet  They are unable to fill this because where it was called into is only for \"inpatient\" patients.  Please fax to [facility] \"outpatient\" pharmacy:  FAX: [phone]  Phone# [phone]  brien   "

## 2022-11-17 ENCOUNTER — MYC REFILL (OUTPATIENT)
Dept: INTERNAL MEDICINE | Facility: CLINIC | Age: 52
End: 2022-11-17

## 2022-11-17 DIAGNOSIS — M62.838 MUSCLE SPASM: Primary | ICD-10-CM

## 2022-11-21 RX ORDER — CYCLOBENZAPRINE HCL 10 MG
10 TABLET ORAL 3 TIMES DAILY PRN
Qty: 30 TABLET | Refills: 0 | Status: SHIPPED | OUTPATIENT
Start: 2022-11-21 | End: 2023-03-29

## 2022-12-25 ENCOUNTER — HEALTH MAINTENANCE LETTER (OUTPATIENT)
Age: 52
End: 2022-12-25

## 2023-01-18 ENCOUNTER — LAB (OUTPATIENT)
Dept: LAB | Facility: CLINIC | Age: 53
End: 2023-01-18
Payer: COMMERCIAL

## 2023-01-18 DIAGNOSIS — N39.0 RECURRENT URINARY TRACT INFECTION: Primary | ICD-10-CM

## 2023-01-18 DIAGNOSIS — N39.0 RECURRENT URINARY TRACT INFECTION: ICD-10-CM

## 2023-01-18 PROCEDURE — 87086 URINE CULTURE/COLONY COUNT: CPT

## 2023-01-20 LAB — BACTERIA UR CULT: NO GROWTH

## 2023-01-27 ENCOUNTER — MYC MEDICAL ADVICE (OUTPATIENT)
Dept: INTERNAL MEDICINE | Facility: CLINIC | Age: 53
End: 2023-01-27
Payer: COMMERCIAL

## 2023-01-27 DIAGNOSIS — K64.4 EXTERNAL HEMORRHOIDS: Primary | ICD-10-CM

## 2023-01-27 RX ORDER — HYDROCORTISONE 25 MG/G
CREAM TOPICAL 2 TIMES DAILY PRN
Qty: 30 G | Refills: 1 | Status: SHIPPED | OUTPATIENT
Start: 2023-01-27

## 2023-01-27 NOTE — TELEPHONE ENCOUNTER
See MyChart from patient needing provider direction.    Please respond directly to patient if appropriate.    Chely John, PIOTR  [facility]  RN Triage Team

## 2023-02-02 ENCOUNTER — MYC MEDICAL ADVICE (OUTPATIENT)
Dept: INTERNAL MEDICINE | Facility: CLINIC | Age: 53
End: 2023-02-02
Payer: COMMERCIAL

## 2023-02-02 NOTE — TELEPHONE ENCOUNTER
To PCP to please advise.   Current BP med:   Disp Refills Start End JANES   amLODIPine-benazepril (LOTREL) 5-40 MG capsule 90 capsule 3 8/8/2022  No   Sig - Route: Take 1 capsule by mouth daily - Oral     Thank you,   PIOTR Flores

## 2023-02-16 ENCOUNTER — MYC MEDICAL ADVICE (OUTPATIENT)
Dept: INTERNAL MEDICINE | Facility: CLINIC | Age: 53
End: 2023-02-16
Payer: COMMERCIAL

## 2023-02-17 NOTE — TELEPHONE ENCOUNTER
LA papers were faxed into [employer], sent to HIM and a copy was sent back to the pt via email per their request.

## 2023-03-29 ENCOUNTER — TELEPHONE (OUTPATIENT)
Dept: FAMILY MEDICINE | Facility: CLINIC | Age: 53
End: 2023-03-29
Payer: COMMERCIAL

## 2023-03-29 ENCOUNTER — MYC MEDICAL ADVICE (OUTPATIENT)
Dept: INTERNAL MEDICINE | Facility: CLINIC | Age: 53
End: 2023-03-29
Payer: COMMERCIAL

## 2023-03-29 DIAGNOSIS — F41.1 ANXIETY REACTION: ICD-10-CM

## 2023-03-29 DIAGNOSIS — M62.838 MUSCLE SPASM: ICD-10-CM

## 2023-03-29 RX ORDER — CYCLOBENZAPRINE HCL 10 MG
10 TABLET ORAL 3 TIMES DAILY PRN
Qty: 30 TABLET | Refills: 0 | Status: SHIPPED | OUTPATIENT
Start: 2023-03-29 | End: 2023-06-30

## 2023-03-29 RX ORDER — LORAZEPAM 0.5 MG/1
TABLET ORAL
Qty: 10 TABLET | Refills: 0 | Status: SHIPPED | OUTPATIENT
Start: 2023-03-29 | End: 2023-03-30

## 2023-03-29 RX ORDER — LORAZEPAM 0.5 MG/1
TABLET ORAL
Qty: 10 TABLET | Refills: 0 | Status: CANCELLED | OUTPATIENT
Start: 2023-03-29

## 2023-03-29 NOTE — TELEPHONE ENCOUNTER
[facility] pharmacy called and verified that she never picked up the lorazepam and cyclobenzaprine. Informed [facility] they can cancel those prescriptions, per pt she wants them to go to a different pharmacy. [facility] confirms DCing both medications.

## 2023-03-29 NOTE — TELEPHONE ENCOUNTER
Routing refill request to provider for review/approval because:  Drug not on the [facility] refill protocol     Last Written Prescription Date: 3/29/23 to wrong pharmacy  Last Fill Quantity: cyclobenzaprine 30, lorazepam 10,  # refills: 0   Last office visit provider:  8/11/22    Requested Prescriptions   Pending Prescriptions Disp Refills     LORazepam (ATIVAN) 0.5 MG tablet 10 tablet 0     Sig: Take 1 or 2 tablets an hour before dental procedure (you will need to get a ride home). Can also take 1-2 tablets to relieve anxiety while on airplane.  May be used twice in 1 day.       There is no refill protocol information for this order        cyclobenzaprine (FLEXERIL) 10 MG tablet 30 tablet 0     Sig: Take 1 tablet (10 mg) by mouth 3 times daily as needed for muscle spasms       There is no refill protocol information for this order          Moira Mattson RN 03/29/23 4:05 PM

## 2023-03-29 NOTE — TELEPHONE ENCOUNTER
Pharmacist calling from [facility] pharmacy. Pt's lorazepam was sent to [facility] inpatient pharmacy, not the out patient pharmacy. They are unable to fill the lorazepam due to this location error. They were able to pull over the cyclobenzaprine since it is not a controlled substance.     Nurys Coreas RN

## 2023-03-30 ENCOUNTER — TELEPHONE (OUTPATIENT)
Dept: INTERNAL MEDICINE | Facility: CLINIC | Age: 53
End: 2023-03-30
Payer: COMMERCIAL

## 2023-03-30 DIAGNOSIS — F41.1 ANXIETY REACTION: ICD-10-CM

## 2023-03-30 RX ORDER — LORAZEPAM 0.5 MG/1
TABLET ORAL
Qty: 10 TABLET | Refills: 0 | Status: SHIPPED | OUTPATIENT
Start: 2023-03-30 | End: 2023-07-13

## 2023-03-30 RX ORDER — LORAZEPAM 0.5 MG/1
TABLET ORAL
Qty: 10 TABLET | Refills: 0 | Status: CANCELLED | OUTPATIENT
Start: 2023-03-30

## 2023-03-30 RX ORDER — LORAZEPAM 0.5 MG/1
TABLET ORAL
Qty: 10 TABLET | Refills: 0 | Status: SHIPPED | OUTPATIENT
Start: 2023-03-30 | End: 2023-03-30

## 2023-03-30 NOTE — TELEPHONE ENCOUNTER
The patient requested the Rxs to be sent there. In our system, there is no [facility] outpatient pharmacy. Please call the pt and see where she wants this Rx to be sent.

## 2023-04-12 ENCOUNTER — MYC MEDICAL ADVICE (OUTPATIENT)
Dept: INTERNAL MEDICINE | Facility: CLINIC | Age: 53
End: 2023-04-12
Payer: COMMERCIAL

## 2023-04-12 NOTE — TELEPHONE ENCOUNTER
Alexa, please see Pt request.   Last office visit: 6/15/22    Please advise if appropriate. Would like daily medication for anxiety management.     Thanks!  Sandra SALDAÑA

## 2023-04-19 ENCOUNTER — OFFICE VISIT (OUTPATIENT)
Dept: PHARMACY | Facility: CLINIC | Age: 53
End: 2023-04-19
Payer: COMMERCIAL

## 2023-04-19 VITALS — SYSTOLIC BLOOD PRESSURE: 145 MMHG | DIASTOLIC BLOOD PRESSURE: 82 MMHG | HEART RATE: 86 BPM

## 2023-04-19 DIAGNOSIS — I10 HYPERTENSION GOAL BP (BLOOD PRESSURE) < 140/90: Primary | ICD-10-CM

## 2023-04-19 DIAGNOSIS — F41.9 ANXIETY: ICD-10-CM

## 2023-04-19 DIAGNOSIS — I10 PRIMARY HYPERTENSION: ICD-10-CM

## 2023-04-19 DIAGNOSIS — M62.830 BACK MUSCLE SPASM: ICD-10-CM

## 2023-04-19 PROCEDURE — 99607 MTMS BY PHARM ADDL 15 MIN: CPT | Performed by: PHARMACIST

## 2023-04-19 PROCEDURE — 99605 MTMS BY PHARM NP 15 MIN: CPT | Performed by: PHARMACIST

## 2023-04-19 RX ORDER — HYDROXYZINE HYDROCHLORIDE 25 MG/1
25-50 TABLET, FILM COATED ORAL EVERY 6 HOURS PRN
Qty: 60 TABLET | Refills: 1 | Status: SHIPPED | OUTPATIENT
Start: 2023-04-19 | End: 2023-11-10

## 2023-04-19 RX ORDER — DULOXETIN HYDROCHLORIDE 20 MG/1
20 CAPSULE, DELAYED RELEASE ORAL DAILY
Qty: 30 CAPSULE | Refills: 1 | Status: SHIPPED | OUTPATIENT
Start: 2023-04-19 | End: 2023-05-17

## 2023-04-19 RX ORDER — AMLODIPINE AND BENAZEPRIL HYDROCHLORIDE 5; 40 MG/1; MG/1
1 CAPSULE ORAL DAILY
Qty: 90 CAPSULE | Refills: 3 | Status: SHIPPED | OUTPATIENT
Start: 2023-04-19 | End: 2023-11-10

## 2023-04-19 RX ORDER — AMLODIPINE AND BENAZEPRIL HYDROCHLORIDE 5; 40 MG/1; MG/1
1 CAPSULE ORAL DAILY
Qty: 90 CAPSULE | Refills: 3 | Status: SHIPPED | OUTPATIENT
Start: 2023-04-19 | End: 2023-04-19

## 2023-04-19 RX ORDER — AMLODIPINE AND BENAZEPRIL HYDROCHLORIDE 10; 40 MG/1; MG/1
1 CAPSULE ORAL DAILY
Qty: 90 CAPSULE | Refills: 3 | Status: SHIPPED | OUTPATIENT
Start: 2023-04-19 | End: 2023-04-19

## 2023-04-19 NOTE — PROGRESS NOTES
Medication Therapy Management (MTM) Encounter    ASSESSMENT:                            1. Hypertension   Blood pressure elevated above goal both in clinic and at home readings. After discussion, patient is often non-adherent with blood pressure medication, missing doses several days per week. Discussed moving administration to evening which is more convenient for her and using a pillbox, which was provided to patient today. If blood pressure remains elevated despite improved medication compliance, there is room to increase dose of amlodipine.     2. Anxiety  Not optimally controlled. Has not tolerate many anti-depressant medications or found ineffective. Recommend trial of low dose duloxetine. Medication education and counseling was provided, including indication, expected effect, dosing instructions, and possible side effects. The patient's questions were answered. Also advised trying hydroxyzine again for as needed use as an alternative to lorazepam. Given concurrent pathological gambling, another medication option is topiramate, but for now will try to find ssri/snri medication that is tolerated to also help with anxiety. Encouraged continuing to meet with support group which she has found helpful.     3. Back muscle spasm  Well controlled with PRN use of cyclobenzaprine.     PLAN:                            Move blood pressure medication amlodipine-benazepril 5-40 mg daily to every evening and use pillbox.     Start duloxetine 20 mg every evening.     Take hydroxyzine 25-50 mg every evening as needed for anxiety.       Follow-up: Return in about 1 month (around 5/19/2023).    SUBJECTIVE/OBJECTIVE:                          Lawanda Katz is a 53 year old female coming in for an initial visit. She was referred to me from self.      Reason for visit: high blood pressure, anxiety.    Allergies/ADRs: Reviewed in chart  Past Medical History: Reviewed in chart  Tobacco: She reports that she has never smoked. She has  never used smokeless tobacco.  Alcohol: Reviewed in chart    Medication Adherence/Access: Admits to not taking medications regularly. Brings blood pressure medication with her in her purse, but often forgets to take in the morning.     1. Hypertension   Current medications include amlodipine-benazepril 5-40 mg daily.  Patient does self-monitor blood pressure. Home BP monitoring in range of 160's systolic over 's diastolic.  Patient reports having a headache lately. Admits to not taking her medication consistently. Keeps in her purse and likes to take with breakfast or at work, but often forgets.     BP Readings from Last 3 Encounters:   04/19/23 (!) 145/82   08/11/22 (!) 164/98   08/08/22 128/88     Potassium   Date Value Ref Range Status   08/08/2022 4.7 3.4 - 5.3 mmol/L Final   04/05/2017 3.8 3.4 - 5.3 mmol/L Final     Sodium   Date Value Ref Range Status   08/08/2022 141 136 - 145 mmol/L Final   04/05/2017 141 133 - 144 mmol/L Final     Creatinine   Date Value Ref Range Status   08/08/2022 0.88 0.51 - 0.95 mg/dL Final   04/05/2017 0.64 0.52 - 1.04 mg/dL Final     2. Anxiety  Current medications include: lorazepam 0.5 mg every evening PRN. Usually uses this to calm herself down after her  leaves for work and she has urge to go to [employer]. Prescription for hydroxyzine as well, but not used recently. Most recently tried venlafaxine 37.5 mg took for 3 days but made her nauseous so stopped it. Struggling with anxiety and pathological gambling. Her gambling addiction started about 6 years ago when she won $20,000 at the [employer]. She started going back to gambling support group classes this week, which have been helpful and she's already feeling better. Says her anxiety is around finances, work, and wanting to [place]. She inacted a self-ban to the [employer] 3 weeks ago.   Has tried many anti-depressants in the past, but often did not tolerate. Fluoxetine, sertraline, buspirone, citalopram (jitery), Paroxetine  (weak, nauseated), Venlafaxine (tingles, shocks, nauseated), Bupropion (shakes, tingles). Has used naltrexone in the past, but couldn't tolerate due to stomach upset.        6/2/2017    11:18 AM 6/5/2017    10:25 AM 8/8/2022     7:36 AM   PHQ-9 SCORE   PHQ-9 Total Score 17 16 0         6/5/2017    10:25 AM 1/10/2018     7:17 AM 8/8/2022     7:36 AM   PARKER-7 SCORE   Total Score  12 (moderate anxiety)    Total Score 19 12 15       3. Back muscle spasm  Currently taking cyclobenzaprine 10 mg daily as needed. Usually takes this at bedtime as needed to help relax and sleep as well. Takes about 1-2 times per week.       Vitals:   BP Readings from Last 3 Encounters:   04/19/23 (!) 145/82   08/11/22 (!) 164/98   08/08/22 128/88      Pulse Readings from Last 3 Encounters:   04/19/23 86   08/11/22 69   08/08/22 80     Wt Readings from Last 3 Encounters:   08/08/22 202 lb 14.4 oz (92 kg)   06/15/22 202 lb (91.6 kg)   04/07/22 203 lb (92.1 kg)     ----------------    I spent 30 minutes with this patient today. All changes were made via collaborative practice agreement with Alexa Terrell CNP. A copy of the visit note was provided to the patient's provider(s).    A summary of these recommendations was sent via [employer].    Emili Mendoza, PharmD, BCACP  Medication Management (MTM) Pharmacist  [facility]        Medication Therapy Recommendations  Anxiety    Current Medication: DULoxetine (CYMBALTA) 20 MG capsule   Rationale: Untreated condition - Needs additional medication therapy - Indication   Recommendation: Start Medication   Status: Accepted per CPA          Current Medication: hydrOXYzine (ATARAX) 25 MG tablet   Rationale: Synergistic therapy - Needs additional medication therapy - Indication   Recommendation: Start Medication   Status: Accepted per [facility]         Hypertension goal BP (blood pressure) < 140/90    Current Medication: amLODIPine-benazepril (LOTREL) 5-40 MG capsule    Rationale: Patient forgets to take - Adherence - Adherence   Recommendation: Provide Adherence Intervention   Status: Patient Agreed - Adherence/Education

## 2023-04-19 NOTE — PATIENT INSTRUCTIONS
"Recommendations from today's Medication Management (MTM) visit:                                                      Take blood pressure medication amlodipine-benazepril 5-40 mg daily every evening. Put in pillbox. :)     When you check blood pressure at home, the goal is <140/90.    Start duloxetine (Cymbalta) 20 mg every evening for anxiety. It's ok to take with your blood pressure medication.     Take hydroxyzine 25-50 mg every evening as needed for anxiety. This is an option instead of lorazepam (Ativan).         To schedule another MTM appointment, please call the MTM scheduling line at [phone] or toll-free at [phone].     My MTM pharmacist's contact information:      Please feel free to contact me with any questions or concerns you have via [employer] or calling the clinic.       Emili Mendoza, PharmD, [facility]  Medication Management (MTM) Pharmacist  [facility]     It was great speaking with you today.  I value your experience and would be very thankful for your time in providing feedback in our clinic survey. In the next few days, you may receive an email or text message from [employer] with a link to a survey related to your  clinical pharmacist.\"     "

## 2023-04-24 ENCOUNTER — OFFICE VISIT (OUTPATIENT)
Dept: PHARMACY | Facility: CLINIC | Age: 53
End: 2023-04-24
Payer: COMMERCIAL

## 2023-04-24 VITALS — DIASTOLIC BLOOD PRESSURE: 75 MMHG | SYSTOLIC BLOOD PRESSURE: 130 MMHG | HEART RATE: 71 BPM

## 2023-04-24 DIAGNOSIS — I10 HYPERTENSION GOAL BP (BLOOD PRESSURE) < 140/90: Primary | ICD-10-CM

## 2023-04-24 PROCEDURE — 99207 PR NO CHARGE LOS: CPT | Performed by: PHARMACIST

## 2023-04-24 NOTE — PROGRESS NOTES
Clinical Pharmacy Consult:                                                    Lawanda Katz is a 53 year old female coming in for a clinical pharmacist consult.  She was referred to me from Alexa Terrell CNP.     Reason for Consult: blood pressure check    Discussion:   Lawanda Katz is a 53 year old female  who presents for evaluation of hypertension. She indicates that she is feeling well and denies any symptoms referable to elevated blood pressure. Patient denies any side effects of medication.     Last week we discussed ways to improve adherence with her blood pressure medication, amlodipine-benazepril 5-40 mg daily. She is now using a pillbox and moved administration to evening which is more convenient and easier for her to remember. No missed doses recently. Blood pressure notably improved today. Blood pressure is well controlled and at goal of <130/80 mm Hg per ACC/AHA hypertension guidelines.    She is also tolerating the newly started duloxetine medication that was started for anxiety. Tends to have GI side effects or other side effects to anti-depressant medication, so she is pleased this is going well so far.     BP Readings from Last 3 Encounters:   04/24/23 130/75   04/19/23 (!) 145/82   08/11/22 (!) 164/98       Plan:  1. Continue current medications.       Return in about 1 month (around 5/24/2023). with PCP        Emili Mendoza, PharmD, BCACP  Medication Management (MTM) Pharmacist  [facility]       Current Outpatient Medications   Medication     amLODIPine-benazepril (LOTREL) 5-40 MG capsule     cyclobenzaprine (FLEXERIL) 10 MG tablet     DULoxetine (CYMBALTA) 20 MG capsule     hydrocortisone, Perianal, (HYDROCORTISONE) 2.5 % cream     hydrOXYzine (ATARAX) 25 MG tablet     LORazepam (ATIVAN) 0.5 MG tablet     valACYclovir (VALTREX) 1000 mg tablet     No current facility-administered medications for this visit.

## 2023-05-15 ASSESSMENT — ANXIETY QUESTIONNAIRES
7. FEELING AFRAID AS IF SOMETHING AWFUL MIGHT HAPPEN: SEVERAL DAYS
GAD7 TOTAL SCORE: 8
4. TROUBLE RELAXING: NOT AT ALL
IF YOU CHECKED OFF ANY PROBLEMS ON THIS QUESTIONNAIRE, HOW DIFFICULT HAVE THESE PROBLEMS MADE IT FOR YOU TO DO YOUR WORK, TAKE CARE OF THINGS AT HOME, OR GET ALONG WITH OTHER PEOPLE: SOMEWHAT DIFFICULT
2. NOT BEING ABLE TO STOP OR CONTROL WORRYING: MORE THAN HALF THE DAYS
8. IF YOU CHECKED OFF ANY PROBLEMS, HOW DIFFICULT HAVE THESE MADE IT FOR YOU TO DO YOUR WORK, TAKE CARE OF THINGS AT HOME, OR GET ALONG WITH OTHER PEOPLE?: SOMEWHAT DIFFICULT
1. FEELING NERVOUS, ANXIOUS, OR ON EDGE: MORE THAN HALF THE DAYS
5. BEING SO RESTLESS THAT IT IS HARD TO SIT STILL: SEVERAL DAYS
GAD7 TOTAL SCORE: 8
6. BECOMING EASILY ANNOYED OR IRRITABLE: NOT AT ALL
GAD7 TOTAL SCORE: 8
7. FEELING AFRAID AS IF SOMETHING AWFUL MIGHT HAPPEN: SEVERAL DAYS
3. WORRYING TOO MUCH ABOUT DIFFERENT THINGS: MORE THAN HALF THE DAYS

## 2023-05-17 ENCOUNTER — OFFICE VISIT (OUTPATIENT)
Dept: INTERNAL MEDICINE | Facility: CLINIC | Age: 53
End: 2023-05-17
Payer: COMMERCIAL

## 2023-05-17 VITALS
DIASTOLIC BLOOD PRESSURE: 80 MMHG | TEMPERATURE: 98.6 F | WEIGHT: 205 LBS | OXYGEN SATURATION: 99 % | HEIGHT: 66 IN | RESPIRATION RATE: 16 BRPM | SYSTOLIC BLOOD PRESSURE: 128 MMHG | BODY MASS INDEX: 32.95 KG/M2 | HEART RATE: 76 BPM

## 2023-05-17 DIAGNOSIS — Z72.6 GAMBLING: ICD-10-CM

## 2023-05-17 DIAGNOSIS — F32.4 MAJOR DEPRESSIVE DISORDER WITH SINGLE EPISODE, IN PARTIAL REMISSION (H): ICD-10-CM

## 2023-05-17 DIAGNOSIS — I10 PRIMARY HYPERTENSION: ICD-10-CM

## 2023-05-17 DIAGNOSIS — F41.9 ANXIETY: ICD-10-CM

## 2023-05-17 LAB
ALBUMIN SERPL BCG-MCNC: 4.6 G/DL (ref 3.5–5.2)
ALP SERPL-CCNC: 92 U/L (ref 35–104)
ALT SERPL W P-5'-P-CCNC: 20 U/L (ref 10–35)
ANION GAP SERPL CALCULATED.3IONS-SCNC: 10 MMOL/L (ref 7–15)
AST SERPL W P-5'-P-CCNC: 27 U/L (ref 10–35)
BILIRUB SERPL-MCNC: 0.3 MG/DL
BUN SERPL-MCNC: 9 MG/DL (ref 6–20)
CALCIUM SERPL-MCNC: 10 MG/DL (ref 8.6–10)
CHLORIDE SERPL-SCNC: 105 MMOL/L (ref 98–107)
CREAT SERPL-MCNC: 0.82 MG/DL (ref 0.51–0.95)
DEPRECATED HCO3 PLAS-SCNC: 28 MMOL/L (ref 22–29)
GFR SERPL CREATININE-BSD FRML MDRD: 85 ML/MIN/1.73M2
GLUCOSE SERPL-MCNC: 96 MG/DL (ref 70–99)
POTASSIUM SERPL-SCNC: 4.9 MMOL/L (ref 3.4–5.3)
PROT SERPL-MCNC: 7.7 G/DL (ref 6.4–8.3)
SODIUM SERPL-SCNC: 143 MMOL/L (ref 136–145)

## 2023-05-17 PROCEDURE — 36415 COLL VENOUS BLD VENIPUNCTURE: CPT | Performed by: NURSE PRACTITIONER

## 2023-05-17 PROCEDURE — 80053 COMPREHEN METABOLIC PANEL: CPT | Performed by: NURSE PRACTITIONER

## 2023-05-17 PROCEDURE — 99214 OFFICE O/P EST MOD 30 MIN: CPT | Performed by: NURSE PRACTITIONER

## 2023-05-17 RX ORDER — DULOXETIN HYDROCHLORIDE 30 MG/1
30 CAPSULE, DELAYED RELEASE ORAL DAILY
Qty: 90 CAPSULE | Refills: 3 | Status: SHIPPED | OUTPATIENT
Start: 2023-05-17 | End: 2023-11-10

## 2023-05-17 RX ORDER — DULOXETIN HYDROCHLORIDE 20 MG/1
20 CAPSULE, DELAYED RELEASE ORAL DAILY
Qty: 30 CAPSULE | Refills: 1 | Status: CANCELLED | OUTPATIENT
Start: 2023-05-17

## 2023-05-17 ASSESSMENT — PATIENT HEALTH QUESTIONNAIRE - PHQ9
10. IF YOU CHECKED OFF ANY PROBLEMS, HOW DIFFICULT HAVE THESE PROBLEMS MADE IT FOR YOU TO DO YOUR WORK, TAKE CARE OF THINGS AT HOME, OR GET ALONG WITH OTHER PEOPLE: NOT DIFFICULT AT ALL
SUM OF ALL RESPONSES TO PHQ QUESTIONS 1-9: 2
SUM OF ALL RESPONSES TO PHQ QUESTIONS 1-9: 2

## 2023-05-17 ASSESSMENT — ANXIETY QUESTIONNAIRES: GAD7 TOTAL SCORE: 8

## 2023-05-17 NOTE — PATIENT INSTRUCTIONS
Increase your Cymbalta to 30 mg daily.    Your labs are processing, I will release results to [employer] once they are back.    I will see you back in 3 months for follow-up for a physical with fasting labs, please let me know before then if anything comes up.

## 2023-05-17 NOTE — PROGRESS NOTES
"  Assessment & Plan     Primary hypertension: Blood pressure today was 128/80. She continues on Lotrel. Stable.   - Comprehensive metabolic panel (BMP + Alb, Alk Phos, ALT, AST, Total. Bili, TP)    Anxiety: PARKER-7 score today was an 8. Will increase cymbalta to 30mg daily.   - DULoxetine (CYMBALTA) 30 MG capsule  Dispense: 90 capsule; Refill: 3    Major depressive disorder with single episode, in partial remission (H): PHQ-9 score today was a 2. Will increase cymbalta dose.     Gamblin days without gambling; is in a support group. Stable.      BMI:   Estimated body mass index is 33.59 kg/m  as calculated from the following:    Height as of this encounter: 1.664 m (5' 5.5\").    Weight as of this encounter: 93 kg (205 lb).   Weight management plan: Discussed healthy diet and exercise guidelines    Alexa Terrell CNP  [facility]    Fredi Solares is a 53 year old, presenting for the following health issues:  Follow Up (Blood pressure and anxiety)        2023    11:10 AM   Additional Questions   Roomed by Jade RASHID     History of Present Illness       Mental Health Follow-up:  Patient presents to follow-up on Anxiety.    Patient's anxiety since last visit has been:  Better  The patient is not having other symptoms associated with anxiety.  Any significant life events: other  Patient is feeling anxious or having panic attacks.  Patient has no concerns about alcohol or drug use.    Hypertension: She presents for follow up of hypertension.  She does check blood pressure  regularly outside of the clinic. Outside blood pressures have been over 140/90. She follows a low salt diet.     She eats 0-1 servings of fruits and vegetables daily.She consumes 2 sweetened beverage(s) daily.She exercises with enough effort to increase her heart rate 10 to 19 minutes per day.  She exercises with enough effort to increase her heart rate 5 days per week. She is missing 1 dose(s) of " "medications per week.  She is not taking prescribed medications regularly due to remembering to take.    Today's PHQ-9         PHQ-9 Total Score: 2    PHQ-9 Q9 Thoughts of better off dead/self-harm past 2 weeks :   Not at all    How difficult have these problems made it for you to do your work, take care of things at home, or get along with other people: Not difficult at all  Today's PARKER-7 Score: 8     The patient presents today for follow up of her blood pressure and mental health.    She reports that she restarted her blood pressure medication, and her blood pressures have been within goal at home.    She also has felt great relief of obsessing about gambling since starting the Cymbalta. She also reports that her anxiety and depression are better.    She is back in a support group for gambling. The last time she went to the "[employer] to play slots was 42 days ago.    She denies other concerns today.    Review of Systems   Constitutional, HEENT, cardiovascular, pulmonary, GI, , musculoskeletal, neuro, skin, endocrine and psych systems are negative, except as otherwise noted.      Objective    /80 (BP Location: Right arm, Patient Position: Sitting)   Pulse 76   Temp 98.6  F (37  C)   Resp 16   Ht 1.664 m (5' 5.5\")   Wt 93 kg (205 lb)   SpO2 99%   BMI 33.59 kg/m    Body mass index is 33.59 kg/m .  Physical Exam   GENERAL: healthy, alert and no distress  EYES: Eyes grossly normal to inspection  RESP: lungs clear to auscultation - no rales, rhonchi or wheezes  CV: regular rate and rhythm, normal S1 S2, no S3 or S4, no murmur, click or rub, no peripheral edema  MS: no gross musculoskeletal defects noted  SKIN: no suspicious lesions or rashes  NEURO: Normal strength and tone, mentation intact and speech normal  PSYCH: mentation appears normal, affect normal/bright          "

## 2023-06-30 DIAGNOSIS — M62.838 MUSCLE SPASM: ICD-10-CM

## 2023-06-30 RX ORDER — CYCLOBENZAPRINE HCL 10 MG
10 TABLET ORAL 3 TIMES DAILY PRN
Qty: 30 TABLET | Refills: 3 | Status: SHIPPED | OUTPATIENT
Start: 2023-06-30 | End: 2023-10-26

## 2023-06-30 NOTE — PROGRESS NOTES
Patient requested a refill of her flexeril, her shoulder has been bothering her. Will refill this for her.

## 2023-07-09 ENCOUNTER — HEALTH MAINTENANCE LETTER (OUTPATIENT)
Age: 53
End: 2023-07-09

## 2023-07-10 ENCOUNTER — PATIENT OUTREACH (OUTPATIENT)
Dept: CARE COORDINATION | Facility: CLINIC | Age: 53
End: 2023-07-10
Payer: COMMERCIAL

## 2023-07-13 ENCOUNTER — MYC REFILL (OUTPATIENT)
Dept: INTERNAL MEDICINE | Facility: CLINIC | Age: 53
End: 2023-07-13
Payer: COMMERCIAL

## 2023-07-13 DIAGNOSIS — F41.1 ANXIETY REACTION: ICD-10-CM

## 2023-07-14 NOTE — TELEPHONE ENCOUNTER
Routing refill request to provider for review/approval because:  Drug not on the [facility] refill protocol   Pt reports not taking 5/17/2023    Last Written Prescription Date:  3/30/2023  Last Fill Quantity: 10,  # refills: 0   Last office visit provider:  5/17/2023     Requested Prescriptions   Pending Prescriptions Disp Refills     LORazepam (ATIVAN) 0.5 MG tablet 10 tablet 0     Sig: Take 1 or 2 tablets an hour before dental procedure (you will need to get a ride home). Can also take 1-2 tablets to relieve anxiety while on airplane.  May be used twice in 1 day.       There is no refill protocol information for this order          Apoorva Hawkins RN 07/14/23 12:44 AM

## 2023-07-17 RX ORDER — LORAZEPAM 0.5 MG/1
TABLET ORAL
Qty: 10 TABLET | Refills: 0 | Status: SHIPPED | OUTPATIENT
Start: 2023-07-17 | End: 2024-04-17

## 2023-07-20 ENCOUNTER — MYC MEDICAL ADVICE (OUTPATIENT)
Dept: INTERNAL MEDICINE | Facility: CLINIC | Age: 53
End: 2023-07-20
Payer: COMMERCIAL

## 2023-07-20 NOTE — LETTER
7/20/2023        RE: Lawanda DELGADO Sterling  [address] [place] [address]        To Whom it May Concern:    Please allow the patient's dogs to stay with her during her vacation, they are emotional support animals for her anxiety and depression.       Sincerely,        Alexa Terrell, CNP

## 2023-07-24 ENCOUNTER — PATIENT OUTREACH (OUTPATIENT)
Dept: CARE COORDINATION | Facility: CLINIC | Age: 53
End: 2023-07-24
Payer: COMMERCIAL

## 2023-08-12 ENCOUNTER — E-VISIT (OUTPATIENT)
Dept: URGENT CARE | Facility: CLINIC | Age: 53
End: 2023-08-12
Payer: COMMERCIAL

## 2023-08-12 DIAGNOSIS — H10.32 ACUTE BACTERIAL CONJUNCTIVITIS OF LEFT EYE: Primary | ICD-10-CM

## 2023-08-12 PROCEDURE — 99207 PR NON-BILLABLE SERV PER CHARTING: CPT | Performed by: PHYSICIAN ASSISTANT

## 2023-08-12 RX ORDER — ERYTHROMYCIN 5 MG/G
OINTMENT OPHTHALMIC
Qty: 3.5 G | Refills: 0 | Status: SHIPPED | OUTPATIENT
Start: 2023-08-12 | End: 2023-08-19

## 2023-08-12 NOTE — LETTER
August 12, 2023      Lawanda Katz  [address] [place] [address]        To Whom It May Concern:    Lawanda Katz  was seen on 8/12/23. Please excuse her from work 8/12-8/13/23.        Sincerely,        Mary Jackson PA-C

## 2023-08-12 NOTE — PATIENT INSTRUCTIONS
This looks like pinkeye but it may also be a stye in your eyelid. I prescribed an antibiotic ointment to use in the eye, but I also recommend following the below instructions for treatment of a possible stye. I will send the work note as well.     Sty (or Stye)  A sty is an infection of the oil gland of the eyelid. It may develop into a small pocket of pus (an abscess). This can cause pain, redness, and swelling. In early stages, a sty is treated with a small towel soaked in warm water (a warm compress). More severe cases may need to be opened and drained by a healthcare provider.  Home care  Artificial tears may also be used to lubricate the eye and make it more comfortable. You can buy these over the counter without a prescription. Talk with your healthcare provider before using any over-the-counter treatment for a sty.  Apply a warm, damp towel to the affected eye for at least 5 minutes, 3 to 4 times a day for a week. Warm compresses open the pores and speed the healing. But if the compresses are too hot, they may burn your eyelid.  Wash your hands before and after touching the infected eyelid to avoid spreading the infection.  Don t squeeze or try to break open the sty.  Follow-up care  Follow up with your healthcare provider, or as advised.   When to seek medical advice  Call your healthcare provider right away if any of these occur:  Increase in swelling or redness around the eyelid after 48 to 72 hours  Increase in eye pain or the eyelid blisters  Increase in warmth--the eyelid feels hot  Drainage of blood or thick pus from the sty  Blister on the eyelid  Inability to open the eyelid due to swelling  Fever of 100.4 F (38 C) or above, or as directed by your provider  Vision changes  Headache or stiff neck  The sty comes back        Tylenol dose = 120 mg = 3.75 ml    Around 35-5 months of age you can begin some solid food once daily - oatmeal is best; I like real, fresh oatmeal, food processed to make it smooth (like wet applesauce consistency).  Start with 2-3 tablespoons of liquid Keep feeding your baby with breast milk and/or formula. To help your baby eat well:  · Continue to feed your baby either breast milk or formula. At night, feed when your baby wakes. At this age, there may be longer stretches of sleep without any feeding.  Jacklyn Fair At 3months of age, most babies sleep around 13 to 18 hours each day. Babies of this age commonly sleep for short spurts throughout the day, rather than for hours at a time.  This will likely improve over the next few months as your baby settles into regula · Avoid using infant seats, car seats, strollers, infant carriers, and infant swings for routine sleep and daily naps. These may lead to obstruction of an infant's airway or suffocation.   · Don't share a bed (co-sleep) with your baby. Bed-sharing has been · Don’t leave the baby on a high surface such as a table, bed, or couch. He or she could fall and get hurt. Also, don’t place the baby in a bouncy seat on a high surface. · Walkers with wheels are not recommended.  Stationary (not moving) activity stations © 2717-2987 [employer] [address]. [address] [facility] [place]. All rights reserved. This information is not intended as a substitute for professional medical care. Always follow your healthcare professional's instructions.

## 2023-09-17 ENCOUNTER — HEALTH MAINTENANCE LETTER (OUTPATIENT)
Age: 53
End: 2023-09-17

## 2023-09-19 ENCOUNTER — MYC MEDICAL ADVICE (OUTPATIENT)
Dept: INTERNAL MEDICINE | Facility: CLINIC | Age: 53
End: 2023-09-19

## 2023-09-19 ENCOUNTER — E-VISIT (OUTPATIENT)
Dept: URGENT CARE | Facility: CLINIC | Age: 53
End: 2023-09-19
Payer: COMMERCIAL

## 2023-09-19 ENCOUNTER — TELEPHONE (OUTPATIENT)
Dept: INTERNAL MEDICINE | Facility: CLINIC | Age: 53
End: 2023-09-19

## 2023-09-19 DIAGNOSIS — Z20.822 SUSPECTED COVID-19 VIRUS INFECTION: Primary | ICD-10-CM

## 2023-09-19 DIAGNOSIS — U07.1 INFECTION DUE TO 2019 NOVEL CORONAVIRUS: Primary | ICD-10-CM

## 2023-09-19 DIAGNOSIS — J02.9 SORE THROAT: ICD-10-CM

## 2023-09-19 PROCEDURE — 99421 OL DIG E/M SVC 5-10 MIN: CPT | Performed by: NURSE PRACTITIONER

## 2023-09-19 NOTE — LETTER
September 20, 2023      Lawanda Katz  [address] [place] [address]        To Whom It May Concern:    Lawanda Katz was seen in our clinic. She may return to work on 09/25/2023.    Sincerely,        Alexa Terrell, CNP  [facility]

## 2023-09-19 NOTE — TELEPHONE ENCOUNTER
RN COVID TREATMENT VISIT  09/19/23      The patient has been triaged and does not require a higher level of care.    Lawanda Katz  53 year old  Current weight? 205#    Has the patient been seen by a primary care provider at an [facility] or [facility] within the past two years? Yes.   Have you been in close proximity to/do you have a known exposure to a person with a confirmed case of influenza? No.     General treatment eligibility:  Date of positive COVID test (PCR or at home)?  09/19/23     Are you or have you been hospitalized for this COVID-19 infection? No.   Have you received monoclonal antibodies or antiviral treatment for COVID-19 since this positive test? No.   Do you have any of the following conditions that place you at risk of being very sick from COVID-19?   - Age 50 years or older  - Mental health disorders including mood disorders, depression, schizophrenia spectrum disorders   - Overweight or Obesity (BMI >85th percentile or BMI 25 or higher)  Yes, patient has at least one high risk condition as noted above.     Current COVID symptoms:   - congestion or runny nose  Yes. Patient has at least one symptom as selected.     How many days since symptoms started? 5 days or less. Established patient, 12 years or older weighing at least 88.2 lbs, who has symptoms that started in the past 5 days, has not been hospitalized nor received treatment already, and is at risk for being very sick from COVID-19.     Treatment eligibility by RN:  Are you currently pregnant or nursing? No  Do you have a clinically significant hypersensitivity to nirmatrelvir or ritonavir, or toxic epidermal necrolysis (TEN) or Garvin-Kamaljit Syndrome? No  Do you have a history of hepatitis, any hepatic impairment on the Problem List (such as Child-Flores Class C, cirrhosis, fatty liver disease, alcoholic liver disease), or was the last liver lab (hepatic panel, ALT, AST, ALK Phos, bilirubin) elevated in  the past 6 months? No  Do you have any history of severe renal impairment (eGFR < 30mL/min)? No    Is patient eligible to continue? Yes, patient meets all eligibility requirements for the RN COVID treatment (as denoted by all no responses above).     Current Outpatient Medications   Medication Sig Dispense Refill    amLODIPine-benazepril (LOTREL) 5-40 MG capsule Take 1 capsule by mouth daily 90 capsule 3    cyclobenzaprine (FLEXERIL) 10 MG tablet Take 1 tablet (10 mg) by mouth 3 times daily as needed for muscle spasms 30 tablet 3    DULoxetine (CYMBALTA) 30 MG capsule Take 1 capsule (30 mg) by mouth daily 90 capsule 3    hydrocortisone, Perianal, (HYDROCORTISONE) 2.5 % cream Place rectally 2 times daily as needed for hemorrhoids 30 g 1    hydrOXYzine (ATARAX) 25 MG tablet Take 1-2 tablets (25-50 mg) by mouth every 6 hours as needed for anxiety 60 tablet 1    LORazepam (ATIVAN) 0.5 MG tablet Take 1 or 2 tablets an hour before dental procedure (you will need to get a ride home). Can also take 1-2 tablets to relieve anxiety while on airplane.  May be used twice in 1 day. 10 tablet 0    valACYclovir (VALTREX) 1000 mg tablet Take 1 tablet (1,000 mg) by mouth daily 90 tablet 0       Medications from List 1 of the standing order (on medications that exclude the use of Paxlovid) that patient is taking: NONE. Is patient taking Kaci's Wort? No  Is patient taking Kaci's Wort or any meds from List 1? No.   Medications from List 2 of the standing order (on meds that provider needs to adjust) that patient is taking: amlodipine (Norvasc), explained a provider visit is necessary to discuss medication adjustments while taking Paxlovid. Is patient on any of the meds from List 2? Yes.     Patient declines to schedule - would like a message sent to her PCP re: paxlovid Rx and managing amlodipine.    Loni Wharton RN

## 2023-09-19 NOTE — TELEPHONE ENCOUNTER
Virtual visit is appropriate for medication management, since Paxlovid has many drug interactions. Please help to schedule. Inform that her PCP is not in the Clinic today.

## 2023-09-19 NOTE — PATIENT INSTRUCTIONS
Dear Sujatha,    Your symptoms show that you may have COVID-19. This illness can cause fever, cough and trouble breathing. Many people get a mild case and get better on their own. Some people can get very sick.    Because you also reported sore throat, I would like to also test you for Strep Throat to determine if we need to treat you for that as well.    What should I do?  For all employees or close contacts (except [place] and [employer] - see below), go to your [employer] home page and scroll down to the section that says  You have an appointment that needs to be scheduled  and click the large green button that says  Schedule Now  and follow the steps to find the next available opening.  It is important that when you are asked what the reason for your appointment is that you mention you need BOTH Covid and Strep tests.    If you are unable to complete these steps or if you cannot find any available times, please call [phone] to schedule employee testing.     [place] employees or close contacts, please call [phone].   [place] ([employer]) employees or close contacts call [phone].    Return to work guidance:   Please let your workplace manager and staffing office know when your isolation ends.   Note: if you tested through [facility], there is no need to report to [facility]. If you did not test through [facility], send a copy of your results to [email or web address]. [place] call [phone],  [place] call [phone].     Please visit the Employee COVID-19 Testing Information page on the COVID-19 TV[employer] site. Here you will find return to work and testing guidance, high and low risk exposure definitions, and frequently asked questions.   [employer] URL: [email or web address]     How can I take care of myself?  Over the counter medications may help with your symptoms such as runny or stuffy nose, cough, chills, or  fever.  Talk to your care team about your options.     Some people are at high risk of severe illness (for example, you have a weak immune system, you re 65 years or older, or you have certain medical problems). If your risk is high and your symptoms started in the last 5 days, we strongly recommend for you to get COVID treatment as soon as possible. Paxlovid and Molnupiravir are proven safe and effective, make you feel better faster, and prevent hospitalization and death.       To schedule an appointment to discuss COVID treatment, request an appointment on [employer] (select  COVID-19 Treatment ) or call [address] [place] [address] ([phone]).    Get lots of rest. Drink extra fluids (unless a doctor has told you not to)  Take Tylenol (acetaminophen) or ibuprofen for fever or pain. If you have liver or kidney problems, ask your family doctor if it's okay to take Tylenol or ibuprofen  Take over the counter medications for your symptoms, as directed by your doctor. You may also talk to your pharmacist.    If you have other health problems (like cancer, heart failure, an organ transplant or severe kidney disease): Call your specialty clinic if you don't feel better in the next 2 days.  Know when to call 911. Emergency warning signs include:  Trouble breathing or shortness of breath  Pain or pressure in the chest that doesn't go away  Feeling confused like you haven't felt before, or not being able to wake up  Bluish-colored lips or face  Where can I get more information?  [facility] - About COVID-19: [email or web address]   CDC - What to Do If You're Sick: www.cdc.gov/coronavirus/2019-ncov/about/steps-when-sick.html  CDC -  Isolation https://www.cdc.gov/coronavirus/2019-ncov/your-health/isolation.html

## 2023-09-19 NOTE — TELEPHONE ENCOUNTER
General Call    Contacts         Type Contact Phone/Fax    09/19/2023 04:18 PM CDT Phone (Incoming) Sujatha Katz (Self) [phone] (H)          Reason for Call:  COVID +    What are your questions or concerns:  Pt tested positive for COVID today 09/19/23. Wanting plaxovid treatment. Please advise.  Pt had an e-visit, please look at that visit.     Date of last appointment with provider: N/A    Could we send this information to you in [employer] or would you prefer to receive a phone call?:   Patient would prefer a phone call   Okay to leave a detailed message?: Yes at Cell number on file:    Telephone Information:   Mobile [phone]

## 2023-09-20 RX ORDER — ALBUTEROL SULFATE 90 UG/1
2 AEROSOL, METERED RESPIRATORY (INHALATION) EVERY 6 HOURS PRN
Qty: 18 G | Refills: 1 | Status: SHIPPED | OUTPATIENT
Start: 2023-09-20 | End: 2023-11-10

## 2023-09-20 RX ORDER — BENZONATATE 200 MG/1
200 CAPSULE ORAL 3 TIMES DAILY PRN
Qty: 30 CAPSULE | Refills: 0 | Status: SHIPPED | OUTPATIENT
Start: 2023-09-20 | End: 2023-11-10

## 2023-10-15 ENCOUNTER — OFFICE VISIT (OUTPATIENT)
Dept: FAMILY MEDICINE | Facility: CLINIC | Age: 53
End: 2023-10-15
Payer: COMMERCIAL

## 2023-10-15 VITALS
WEIGHT: 205 LBS | RESPIRATION RATE: 16 BRPM | DIASTOLIC BLOOD PRESSURE: 94 MMHG | TEMPERATURE: 98.3 F | BODY MASS INDEX: 33.59 KG/M2 | OXYGEN SATURATION: 97 % | HEART RATE: 75 BPM | SYSTOLIC BLOOD PRESSURE: 137 MMHG

## 2023-10-15 DIAGNOSIS — R39.89 URINARY PROBLEM: Primary | ICD-10-CM

## 2023-10-15 LAB
ALBUMIN UR-MCNC: ABNORMAL MG/DL
AMORPH CRY #/AREA URNS HPF: ABNORMAL /HPF
APPEARANCE UR: CLEAR
BILIRUB UR QL STRIP: NEGATIVE
COLOR UR AUTO: YELLOW
GLUCOSE UR STRIP-MCNC: NEGATIVE MG/DL
HGB UR QL STRIP: NEGATIVE
KETONES UR STRIP-MCNC: NEGATIVE MG/DL
LEUKOCYTE ESTERASE UR QL STRIP: NEGATIVE
NITRATE UR QL: NEGATIVE
PH UR STRIP: 5.5 [PH] (ref 5–8)
RBC #/AREA URNS AUTO: ABNORMAL /HPF
SP GR UR STRIP: >=1.03 (ref 1–1.03)
SQUAMOUS #/AREA URNS AUTO: ABNORMAL /LPF
UROBILINOGEN UR STRIP-ACNC: 0.2 E.U./DL
WBC #/AREA URNS AUTO: ABNORMAL /HPF

## 2023-10-15 PROCEDURE — 99213 OFFICE O/P EST LOW 20 MIN: CPT | Performed by: PHYSICIAN ASSISTANT

## 2023-10-15 PROCEDURE — 81001 URINALYSIS AUTO W/SCOPE: CPT | Performed by: PHYSICIAN ASSISTANT

## 2023-10-15 NOTE — PATIENT INSTRUCTIONS
Continue to monitor your send times until resolution.  Increase your fluids to help with comfort   use over-the-counter Azo for comfort.  Follow packaging directions.  Return to walk-in care with primary care provider if not getting good resolution or if new symptoms or concerns arise

## 2023-10-15 NOTE — PROGRESS NOTES
Patient presents with:  Urinary Problem: Lower abdominal pain and upper back pain that started yesterday. Short of breath with exertion since she got covid 1 month ago.      Clinical Decision Making:  Multiple etiologies and diagnoses were considered to include but not limited to urinary tract infection, dysuria, hyperglycemia.  Urinalysis was negative for infection.  patient is treated with watchful waiting and symptomatic care with expected course of resolution and indication for return was gone over.        ICD-10-CM    1. Urinary problem  R39.89 UA Macroscopic with reflex to Microscopic and Culture - Clinic Collect     UA Microscopic with Reflex to Culture          Patient Instructions   Continue to monitor your send times until resolution.  Increase your fluids to help with comfort   use over-the-counter Azo for comfort.  Follow packaging directions.  Return to walk-in care with primary care provider if not getting good resolution or if new symptoms or concerns arise    HPI:  Lawanda Katz is a 53 year old female who presents today for a two day history of irritative voiding symptoms to include urinary hesitancy urgency frequency and dysuria and slight left CVA tenderness that is a three out of ten pain and is not keeping her awake at night.  Patient denies gross hematuria.  Denies fever chills night sweats fatigue or other red flag symptoms to include back or vaginal discharge.  She has had a recent urinary tract infections does feel similar to how her other symptoms have been.    History obtained from chart review and the patient.    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    Vitals:    10/15/23 1519   BP: (!) 137/94   Pulse: 75   Resp: 16   Temp: 98.3  F (36.8  C)   TempSrc: Oral   SpO2: 97%   Weight: 93 kg (205 lb)       General: Patient is resting comfortably no acute distress is afebrile  HEENT: Head is normocephalic atraumatic   eyes are PERRL EOMI sclera anicteric   TMs are clear bilaterally  Throat is with mild pharyngeal wall erythema and no exudate  No cervical lymphadenopathy present  LUNGS: Clear to auscultation bilaterally  HEART: Regular rate and rhythm  Abdomen:  Nontender nondistended no rebound or guarding no masses no CVA tenderness to percussion.  No suprapubic tenderness to palpation or induration.  Skin: Without rash non-diaphoretic    Physical Exam      Labs:  Results for orders placed or performed in visit on 10/15/23   UA Macroscopic with reflex to Microscopic and Culture - Clinic Collect     Status: Abnormal    Specimen: Urine, Clean Catch   Result Value Ref [place]    Color Urine Yellow Colorless, Straw, Light Yellow, Yellow    Appearance Urine Clear Clear    Glucose Urine Negative Negative mg/dL    Bilirubin Urine Negative Negative    Ketones Urine Negative Negative mg/dL    Specific Gravity Urine >=1.030 1.005 - 1.030    Blood Urine Negative Negative    pH Urine 5.5 5.0 - 8.0    Protein Albumin Urine Trace (A) Negative mg/dL    Urobilinogen Urine 0.2 0.2, 1.0 E.U./dL    Nitrite Urine Negative Negative    Leukocyte Esterase Urine Negative Negative   UA Microscopic with Reflex to Culture     Status: Abnormal   Result Value Ref [place]    RBC Urine None Seen 0-2 /HPF /HPF    WBC Urine None Seen 0-5 /HPF /HPF    Squamous Epithelials Urine Few (A) None Seen /LPF     Amorphous Crystals Urine Few (A) None Seen /HPF    Narrative    Urine Culture not indicated     At the end of the encounter, I discussed results, diagnosis, medications. Discussed red flags for immediate return to clinic/ER, as well as indications for follow up if no improvement. Patient understood and agreed to plan. Patient was stable for discharge.

## 2023-10-26 ENCOUNTER — MYC REFILL (OUTPATIENT)
Dept: INTERNAL MEDICINE | Facility: CLINIC | Age: 53
End: 2023-10-26
Payer: COMMERCIAL

## 2023-10-26 DIAGNOSIS — M62.838 MUSCLE SPASM: ICD-10-CM

## 2023-10-26 RX ORDER — CYCLOBENZAPRINE HCL 10 MG
10 TABLET ORAL 3 TIMES DAILY PRN
Qty: 30 TABLET | Refills: 3 | Status: SHIPPED | OUTPATIENT
Start: 2023-10-26 | End: 2023-12-12

## 2023-11-10 ENCOUNTER — OFFICE VISIT (OUTPATIENT)
Dept: INTERNAL MEDICINE | Facility: CLINIC | Age: 53
End: 2023-11-10
Payer: COMMERCIAL

## 2023-11-10 VITALS
OXYGEN SATURATION: 99 % | RESPIRATION RATE: 16 BRPM | BODY MASS INDEX: 33.59 KG/M2 | HEART RATE: 84 BPM | DIASTOLIC BLOOD PRESSURE: 86 MMHG | SYSTOLIC BLOOD PRESSURE: 120 MMHG | HEIGHT: 66 IN | TEMPERATURE: 97.8 F | WEIGHT: 209 LBS

## 2023-11-10 DIAGNOSIS — Z13.21 SCREENING FOR ENDOCRINE, NUTRITIONAL, METABOLIC AND IMMUNITY DISORDER: ICD-10-CM

## 2023-11-10 DIAGNOSIS — R06.02 SOB (SHORTNESS OF BREATH): ICD-10-CM

## 2023-11-10 DIAGNOSIS — B00.1 RECURRENT COLD SORES: ICD-10-CM

## 2023-11-10 DIAGNOSIS — E55.9 VITAMIN D DEFICIENCY: ICD-10-CM

## 2023-11-10 DIAGNOSIS — F41.9 ANXIETY: ICD-10-CM

## 2023-11-10 DIAGNOSIS — Z12.31 VISIT FOR SCREENING MAMMOGRAM: ICD-10-CM

## 2023-11-10 DIAGNOSIS — N95.1 MENOPAUSAL SYNDROME (HOT FLASHES): ICD-10-CM

## 2023-11-10 DIAGNOSIS — F32.4 MAJOR DEPRESSIVE DISORDER WITH SINGLE EPISODE, IN PARTIAL REMISSION (H): ICD-10-CM

## 2023-11-10 DIAGNOSIS — Z00.00 ENCOUNTER FOR ANNUAL PHYSICAL EXAM: ICD-10-CM

## 2023-11-10 DIAGNOSIS — Z13.0 SCREENING FOR ENDOCRINE, NUTRITIONAL, METABOLIC AND IMMUNITY DISORDER: ICD-10-CM

## 2023-11-10 DIAGNOSIS — Z72.6 GAMBLING: ICD-10-CM

## 2023-11-10 DIAGNOSIS — Z12.11 SCREEN FOR COLON CANCER: ICD-10-CM

## 2023-11-10 DIAGNOSIS — Z13.29 SCREENING FOR ENDOCRINE, NUTRITIONAL, METABOLIC AND IMMUNITY DISORDER: ICD-10-CM

## 2023-11-10 DIAGNOSIS — Z13.228 SCREENING FOR ENDOCRINE, NUTRITIONAL, METABOLIC AND IMMUNITY DISORDER: ICD-10-CM

## 2023-11-10 DIAGNOSIS — I10 PRIMARY HYPERTENSION: ICD-10-CM

## 2023-11-10 LAB
ALBUMIN SERPL BCG-MCNC: 4.3 G/DL (ref 3.5–5.2)
ALP SERPL-CCNC: 87 U/L (ref 35–104)
ALT SERPL W P-5'-P-CCNC: 28 U/L (ref 0–50)
ANION GAP SERPL CALCULATED.3IONS-SCNC: 11 MMOL/L (ref 7–15)
AST SERPL W P-5'-P-CCNC: 34 U/L (ref 0–45)
BASOPHILS # BLD AUTO: 0 10E3/UL (ref 0–0.2)
BASOPHILS NFR BLD AUTO: 1 %
BILIRUB SERPL-MCNC: 0.5 MG/DL
BUN SERPL-MCNC: 10.5 MG/DL (ref 6–20)
CALCIUM SERPL-MCNC: 9.8 MG/DL (ref 8.6–10)
CHLORIDE SERPL-SCNC: 105 MMOL/L (ref 98–107)
CHOLEST SERPL-MCNC: 181 MG/DL
CREAT SERPL-MCNC: 0.88 MG/DL (ref 0.51–0.95)
DEPRECATED HCO3 PLAS-SCNC: 27 MMOL/L (ref 22–29)
EGFRCR SERPLBLD CKD-EPI 2021: 78 ML/MIN/1.73M2
EOSINOPHIL # BLD AUTO: 0 10E3/UL (ref 0–0.7)
EOSINOPHIL NFR BLD AUTO: 0 %
ERYTHROCYTE [DISTWIDTH] IN BLOOD BY AUTOMATED COUNT: 12.6 % (ref 10–15)
GLUCOSE SERPL-MCNC: 93 MG/DL (ref 70–99)
HBA1C MFR BLD: 5.4 % (ref 0–5.6)
HCT VFR BLD AUTO: 40.2 % (ref 35–47)
HDLC SERPL-MCNC: 46 MG/DL
HGB BLD-MCNC: 13.8 G/DL (ref 11.7–15.7)
IMM GRANULOCYTES # BLD: 0 10E3/UL
IMM GRANULOCYTES NFR BLD: 0 %
LDLC SERPL CALC-MCNC: 116 MG/DL
LYMPHOCYTES # BLD AUTO: 1.7 10E3/UL (ref 0.8–5.3)
LYMPHOCYTES NFR BLD AUTO: 41 %
MCH RBC QN AUTO: 26.5 PG (ref 26.5–33)
MCHC RBC AUTO-ENTMCNC: 34.3 G/DL (ref 31.5–36.5)
MCV RBC AUTO: 77 FL (ref 78–100)
MONOCYTES # BLD AUTO: 0.3 10E3/UL (ref 0–1.3)
MONOCYTES NFR BLD AUTO: 7 %
NEUTROPHILS # BLD AUTO: 2.1 10E3/UL (ref 1.6–8.3)
NEUTROPHILS NFR BLD AUTO: 51 %
NONHDLC SERPL-MCNC: 135 MG/DL
PLATELET # BLD AUTO: 179 10E3/UL (ref 150–450)
POTASSIUM SERPL-SCNC: 4.6 MMOL/L (ref 3.4–5.3)
PROT SERPL-MCNC: 7.5 G/DL (ref 6.4–8.3)
RBC # BLD AUTO: 5.2 10E6/UL (ref 3.8–5.2)
SODIUM SERPL-SCNC: 143 MMOL/L (ref 135–145)
TRIGL SERPL-MCNC: 97 MG/DL
TSH SERPL DL<=0.005 MIU/L-ACNC: 2.6 UIU/ML (ref 0.3–4.2)
VIT D+METAB SERPL-MCNC: 27 NG/ML (ref 20–50)
WBC # BLD AUTO: 4.2 10E3/UL (ref 4–11)

## 2023-11-10 PROCEDURE — 36415 COLL VENOUS BLD VENIPUNCTURE: CPT | Performed by: NURSE PRACTITIONER

## 2023-11-10 PROCEDURE — 83036 HEMOGLOBIN GLYCOSYLATED A1C: CPT | Performed by: NURSE PRACTITIONER

## 2023-11-10 PROCEDURE — 84443 ASSAY THYROID STIM HORMONE: CPT | Performed by: NURSE PRACTITIONER

## 2023-11-10 PROCEDURE — 80053 COMPREHEN METABOLIC PANEL: CPT | Performed by: NURSE PRACTITIONER

## 2023-11-10 PROCEDURE — 80061 LIPID PANEL: CPT | Performed by: NURSE PRACTITIONER

## 2023-11-10 PROCEDURE — 85025 COMPLETE CBC W/AUTO DIFF WBC: CPT | Performed by: NURSE PRACTITIONER

## 2023-11-10 PROCEDURE — 99396 PREV VISIT EST AGE 40-64: CPT | Performed by: NURSE PRACTITIONER

## 2023-11-10 PROCEDURE — 82306 VITAMIN D 25 HYDROXY: CPT | Performed by: NURSE PRACTITIONER

## 2023-11-10 PROCEDURE — 99214 OFFICE O/P EST MOD 30 MIN: CPT | Mod: 25 | Performed by: NURSE PRACTITIONER

## 2023-11-10 RX ORDER — VALACYCLOVIR HYDROCHLORIDE 1 G/1
1000 TABLET, FILM COATED ORAL DAILY
Qty: 90 TABLET | Refills: 0 | Status: SHIPPED | OUTPATIENT
Start: 2023-11-10

## 2023-11-10 RX ORDER — ALBUTEROL SULFATE 90 UG/1
2 AEROSOL, METERED RESPIRATORY (INHALATION) EVERY 6 HOURS PRN
Qty: 18 G | Refills: 1 | Status: SHIPPED | OUTPATIENT
Start: 2023-11-10 | End: 2024-07-01

## 2023-11-10 RX ORDER — HYDROXYZINE HYDROCHLORIDE 25 MG/1
25-50 TABLET, FILM COATED ORAL EVERY 6 HOURS PRN
Qty: 60 TABLET | Refills: 1 | Status: SHIPPED | OUTPATIENT
Start: 2023-11-10 | End: 2024-09-18

## 2023-11-10 RX ORDER — AMLODIPINE AND BENAZEPRIL HYDROCHLORIDE 5; 40 MG/1; MG/1
1 CAPSULE ORAL DAILY
Qty: 90 CAPSULE | Refills: 3 | Status: SHIPPED | OUTPATIENT
Start: 2023-11-10 | End: 2024-02-15

## 2023-11-10 ASSESSMENT — ENCOUNTER SYMPTOMS
FREQUENCY: 0
CHILLS: 0
CONSTIPATION: 0
WEAKNESS: 0
HEMATOCHEZIA: 0
HEADACHES: 0
FEVER: 0
HEARTBURN: 0
ABDOMINAL PAIN: 0
EYE PAIN: 0
PARESTHESIAS: 0
COUGH: 0
PALPITATIONS: 0
NAUSEA: 0
SHORTNESS OF BREATH: 1
DYSURIA: 0
NERVOUS/ANXIOUS: 0
DIZZINESS: 0
HEMATURIA: 0
ARTHRALGIAS: 0
SORE THROAT: 0
DIARRHEA: 0
MYALGIAS: 1
JOINT SWELLING: 0

## 2023-11-10 ASSESSMENT — PATIENT HEALTH QUESTIONNAIRE - PHQ9
SUM OF ALL RESPONSES TO PHQ QUESTIONS 1-9: 5
10. IF YOU CHECKED OFF ANY PROBLEMS, HOW DIFFICULT HAVE THESE PROBLEMS MADE IT FOR YOU TO DO YOUR WORK, TAKE CARE OF THINGS AT HOME, OR GET ALONG WITH OTHER PEOPLE: SOMEWHAT DIFFICULT
SUM OF ALL RESPONSES TO PHQ QUESTIONS 1-9: 5

## 2023-11-10 NOTE — PROGRESS NOTES
SUBJECTIVE:   CC: Sujatha is an 53 year old who presents for preventive health visit.       11/10/2023     8:16 AM   Additional Questions   Roomed by Jade RASHID       The patient presents today for her annual physical. She is fasted today.    She reports that she continues in her Gambling support group three days per week, she has been doing this since April. She reports one relapse of gambling in September, but none since then. Her gambling makes her anxious. She has been taking Hydroxyzine as needed to help with this.    She also has been having issues with hot flashes, will start with a TSH check.    She is due for a Mammogram and Colonoscopy, will order these.    She had COVID in September since then she has noticed that she gets short of breath easier. Discussed this is likely related to her lungs healing post COVID. She uses an albuterol inhaler PRN, and this has helped.     Healthy Habits:     Getting at least 3 servings of Calcium per day:  Yes    Bi-annual eye exam:  NO    Dental care twice a year:  NO    Sleep apnea or symptoms of sleep apnea:  None    Diet:  Regular (no restrictions)    Frequency of exercise:  1 day/week    Duration of exercise:  45-60 minutes    Taking medications regularly:  Yes    Medication side effects:  None    Additional concerns today:  Yes      Today's PHQ-9 Score:       11/10/2023     8:14 AM   PHQ-9 SCORE   PHQ-9 Total Score MyChart 5 (Mild depression)   PHQ-9 Total Score 5     Social History     Tobacco Use    Smoking status: Never     Passive exposure: Past    Smokeless tobacco: Never   Substance Use Topics    Alcohol use: No     Alcohol/week: 0.0 standard drinks of alcohol           11/10/2023     8:20 AM   Alcohol Use   Prescreen: >3 drinks/day or >7 drinks/week? Not Applicable     Reviewed orders with patient.  Reviewed health maintenance and updated orders accordingly - Yes  Lab work is in process    Breast Cancer Screening:  Any new diagnosis of family breast, ovarian, or  bowel cancer? No    FHS-7:       11/10/2023     8:24 AM   Breast CA Risk Assessment (FHS-7)   Did any of your first-degree relatives have breast or ovarian cancer? Unknown   Did any of your relatives have bilateral breast cancer? No   Did any man in your family have breast cancer? No   Did any woman in your family have breast and ovarian cancer? Unknown   Did any woman in your family have breast cancer before age 50 y? Unknown   Do you have 2 or more relatives with breast and/or ovarian cancer? Yes   Do you have 2 or more relatives with breast and/or bowel cancer? Unknown     Mammogram Screening: Recommended annual mammography  Pertinent mammograms are reviewed under the imaging tab.    History of abnormal Pap smear: Status post benign hysterectomy. Health Maintenance and Surgical History updated.     Reviewed and updated as needed this visit by clinical staff   Tobacco  Allergies  Meds              Reviewed and updated as needed this visit by Provider                 Past Medical History:   Diagnosis Date    Anxiety     Depression     Hypertension       Past Surgical History:   Procedure Laterality Date    ADENOIDECTOMY      CYSTOSCOPY  2007    HYSTERECTOMY, PAP NO LONGER INDICATED  2010    BSO, cervix removed, for dysmenorrhea    TUBAL LIGATION  1999    US BREAST BIOPSY LT  1/2013    left, benign       Review of Systems   Constitutional:  Negative for chills and fever.   HENT:  Negative for congestion, ear pain, hearing loss and sore throat.    Eyes:  Negative for pain and visual disturbance.   Respiratory:  Positive for shortness of breath. Negative for cough.    Cardiovascular:  Negative for chest pain, palpitations and peripheral edema.   Gastrointestinal:  Negative for abdominal pain, constipation, diarrhea, heartburn, hematochezia and nausea.   Genitourinary:  Negative for dysuria, frequency, genital sores, hematuria and urgency.   Musculoskeletal:  Positive for myalgias. Negative for arthralgias and joint  "swelling.   Skin:  Negative for rash.   Neurological:  Negative for dizziness, weakness, headaches and paresthesias.   Psychiatric/Behavioral:  Negative for mood changes. The patient is not nervous/anxious.         OBJECTIVE:   /86 (BP Location: Right arm, Patient Position: Sitting)   Pulse 84   Temp 97.8  F (36.6  C)   Resp 16   Ht 1.676 m (5' 6\")   Wt 94.8 kg (209 lb)   LMP  (LMP Unknown)   SpO2 99%   BMI 33.73 kg/m    Physical Exam  GENERAL APPEARANCE: healthy, alert and no distress  EYES: Eyes grossly normal to inspection, PERRL and conjunctivae and sclerae normal  HENT: ear canals and TM's normal, nose and mouth without ulcers or lesions, oropharynx clear and oral mucous membranes moist  NECK: no adenopathy, no asymmetry, masses, or scars and thyroid normal to palpation  RESP: lungs clear to auscultation - no rales, rhonchi or wheezes  CV: regular rate and rhythm, normal S1 S2, no S3 or S4, no murmur, click or rub, no peripheral edema and peripheral pulses strong  ABDOMEN: soft, nontender, no hepatosplenomegaly, no masses and bowel sounds normal  MS: no musculoskeletal defects are noted and gait is age appropriate without ataxia  SKIN: no suspicious lesions or rashes  NEURO: Normal strength and tone, sensory exam grossly normal, mentation intact and speech normal  PSYCH: mentation appears normal and affect normal/bright    Diagnostic Test Results: Mammogram  Labs reviewed in Epic    ASSESSMENT/PLAN:   Sujatha was seen today for physical.    Diagnoses and all orders for this visit:    Encounter for annual physical exam: Fasted labs ordered today. Colonoscopy and mammogram ordered.     Anxiety: She continues on Hydroxyzine PRN. Stable.   -     hydrOXYzine (ATARAX) 25 MG tablet; Take 1-2 tablets (25-50 mg) by mouth every 6 hours as needed for anxiety    Major depressive disorder with single episode, in partial remission (H24): PHQ-9 score today was a 5. She continues in therapy. Stable.     Gambling: " She is attending a [employer] group three times per week. Stable.     Recurrent cold sores: Refilled her medication today. Stable.   -     valACYclovir (VALTREX) 1000 mg tablet; Take 1 tablet (1,000 mg) by mouth daily    Primary hypertension: Blood pressure today in office was 120/86. She continues on Lotrel. Stable.   -     amLODIPine-benazepril (LOTREL) 5-40 MG capsule; Take 1 capsule by mouth daily  -     Comprehensive metabolic panel (BMP + Alb, Alk Phos, ALT, AST, Total. Bili, TP); Future    Menopausal syndrome (hot flashes): Hot flashes come and go, will check TSH.   -     TSH with free T4 reflex    Vitamin D deficiency  -     Vitamin D Deficiency    SOB (shortness of breath): Lungs are likely healing from recent COVID. She will slowly increase her activity.   -     albuterol (PROAIR HFA/PROVENTIL HFA/VENTOLIN HFA) 108 (90 Base) MCG/ACT inhaler; Inhale 2 puffs into the lungs every 6 hours as needed for shortness of breath, wheezing or cough    Screening for endocrine, nutritional, metabolic and immunity disorder  -     CBC with platelets and differential; Future  -     Comprehensive metabolic panel (BMP + Alb, Alk Phos, ALT, AST, Total. Bili, TP); Future  -     Lipid panel reflex to direct LDL Fasting; Future  -     Hemoglobin A1c; Future    Screen for colon cancer  -     Colonoscopy Screening  Referral; Future    Visit for screening mammogram  -     MA SCREENING DIGITAL BILAT - Future  (s+30); Future    Other orders  -     REVIEW OF HEALTH MAINTENANCE PROTOCOL ORDERS        Patient has been advised of split billing requirements and indicates understanding: Yes      COUNSELING:  Reviewed preventive health counseling, as reflected in patient instructions  Special attention given to:        Regular exercise       Healthy diet/nutrition       Vision screening       Hearing screening      BMI:   Estimated body mass index is 33.73 kg/m  as calculated from the following:    Height as of this encounter: 1.676  "m (5' 6\").    Weight as of this encounter: 94.8 kg (209 lb).   Weight management plan: Discussed healthy diet and exercise guidelines      She reports that she has never smoked. She has been exposed to tobacco smoke. She has never used smokeless tobacco.          Alexa Terrell CNP  [facility]  Answers submitted by the patient for this visit:  Patient Health Questionnaire (Submitted on 11/10/2023)  If you checked off any problems, how difficult have these problems made it for you to do your work, take care of things at home, or get along with other people?: Somewhat difficult  PHQ9 TOTAL SCORE: 5    "

## 2023-11-10 NOTE — PATIENT INSTRUCTIONS
To schedule your mammogram call [phone].    To schedule your colonoscopy call [phone].    Continue your current medications.    Your labs are processing, I will release results to [facility] once they are back.    Follow-up in a year with fasting labs with your new provider.

## 2023-12-01 ENCOUNTER — HOSPITAL ENCOUNTER (OUTPATIENT)
Dept: MAMMOGRAPHY | Facility: CLINIC | Age: 53
Discharge: HOME | End: 2023-12-01
Attending: NURSE PRACTITIONER | Admitting: NURSE PRACTITIONER
Payer: COMMERCIAL

## 2023-12-01 DIAGNOSIS — Z12.31 VISIT FOR SCREENING MAMMOGRAM: ICD-10-CM

## 2023-12-01 PROCEDURE — 77067 SCR MAMMO BI INCL CAD: CPT

## 2023-12-12 ENCOUNTER — MYC REFILL (OUTPATIENT)
Dept: INTERNAL MEDICINE | Facility: CLINIC | Age: 53
End: 2023-12-12
Payer: COMMERCIAL

## 2023-12-12 DIAGNOSIS — M62.838 MUSCLE SPASM: ICD-10-CM

## 2023-12-12 RX ORDER — CYCLOBENZAPRINE HCL 10 MG
10 TABLET ORAL 3 TIMES DAILY PRN
Qty: 30 TABLET | Refills: 0 | Status: SHIPPED | OUTPATIENT
Start: 2023-12-12 | End: 2024-04-17

## 2024-01-16 ENCOUNTER — MYC MEDICAL ADVICE (OUTPATIENT)
Dept: INTERNAL MEDICINE | Facility: CLINIC | Age: 54
End: 2024-01-16
Payer: COMMERCIAL

## 2024-01-16 DIAGNOSIS — Z00.00 HEALTHCARE MAINTENANCE: Primary | ICD-10-CM

## 2024-02-15 ENCOUNTER — VIRTUAL VISIT (OUTPATIENT)
Dept: FAMILY MEDICINE | Facility: CLINIC | Age: 54
End: 2024-02-15
Payer: COMMERCIAL

## 2024-02-15 DIAGNOSIS — F41.9 ANXIETY: ICD-10-CM

## 2024-02-15 DIAGNOSIS — F63.0 PATHOLOGICAL GAMBLING: Primary | ICD-10-CM

## 2024-02-15 DIAGNOSIS — I10 HYPERTENSION GOAL BP (BLOOD PRESSURE) < 140/90: ICD-10-CM

## 2024-02-15 PROCEDURE — 99214 OFFICE O/P EST MOD 30 MIN: CPT | Mod: 95 | Performed by: NURSE PRACTITIONER

## 2024-02-15 RX ORDER — DULOXETIN HYDROCHLORIDE 30 MG/1
30 CAPSULE, DELAYED RELEASE ORAL DAILY
Qty: 30 CAPSULE | Refills: 0 | Status: SHIPPED | OUTPATIENT
Start: 2024-02-15 | End: 2024-07-01

## 2024-02-15 RX ORDER — AMLODIPINE AND BENAZEPRIL HYDROCHLORIDE 10; 40 MG/1; MG/1
CAPSULE ORAL
COMMUNITY
Start: 2024-01-22 | End: 2024-02-15

## 2024-02-15 RX ORDER — AMLODIPINE AND BENAZEPRIL HYDROCHLORIDE 10; 40 MG/1; MG/1
1 CAPSULE ORAL DAILY
Qty: 90 CAPSULE | Refills: 0 | Status: SHIPPED | OUTPATIENT
Start: 2024-02-15 | End: 2024-07-01

## 2024-02-15 ASSESSMENT — ANXIETY QUESTIONNAIRES
GAD7 TOTAL SCORE: 15
IF YOU CHECKED OFF ANY PROBLEMS ON THIS QUESTIONNAIRE, HOW DIFFICULT HAVE THESE PROBLEMS MADE IT FOR YOU TO DO YOUR WORK, TAKE CARE OF THINGS AT HOME, OR GET ALONG WITH OTHER PEOPLE: SOMEWHAT DIFFICULT
2. NOT BEING ABLE TO STOP OR CONTROL WORRYING: NEARLY EVERY DAY
7. FEELING AFRAID AS IF SOMETHING AWFUL MIGHT HAPPEN: MORE THAN HALF THE DAYS
8. IF YOU CHECKED OFF ANY PROBLEMS, HOW DIFFICULT HAVE THESE MADE IT FOR YOU TO DO YOUR WORK, TAKE CARE OF THINGS AT HOME, OR GET ALONG WITH OTHER PEOPLE?: SOMEWHAT DIFFICULT
5. BEING SO RESTLESS THAT IT IS HARD TO SIT STILL: SEVERAL DAYS
7. FEELING AFRAID AS IF SOMETHING AWFUL MIGHT HAPPEN: MORE THAN HALF THE DAYS
1. FEELING NERVOUS, ANXIOUS, OR ON EDGE: NEARLY EVERY DAY
4. TROUBLE RELAXING: SEVERAL DAYS
6. BECOMING EASILY ANNOYED OR IRRITABLE: MORE THAN HALF THE DAYS
GAD7 TOTAL SCORE: 15
3. WORRYING TOO MUCH ABOUT DIFFERENT THINGS: NEARLY EVERY DAY

## 2024-02-15 NOTE — PROGRESS NOTES
Sujatha is a 54 year old who is being evaluated via a billable video visit.      How would you like to obtain your AVS? MyChart  If the video visit is dropped, the invitation should be resent by: Text to cell phone: [phone]  Will anyone else be joining your video visit? No          Assessment & Plan     Pathological gambling  Discussed multiple options for medication. Discussed that there isn't necessarily evidence that duloxetine is helpful for gambling cravings but as she tolerated previously for anxiety and seemed to be helpful we will restart.    Discussed possibility of adding naltrexone. Declined at this time.    Referral placed to mental health for multiple issues-therapy, psychiatry to discuss other potential mental health diagnoses, and possible outpatient therapy for gambling addiction-patient may benefit from intensive outpatient gambling program.     - DULoxetine (CYMBALTA) 30 MG capsule; Take 1 capsule (30 mg) by mouth daily for 30 days  - Adult Mental Health  Referral; Future  - Adult Mental Health  Referral; Future  - Adult Mental Health  Referral; Future    Anxiety  As above.   - DULoxetine (CYMBALTA) 30 MG capsule; Take 1 capsule (30 mg) by mouth daily for 30 days  - Adult Mental Health  Referral; Future    Hypertension goal BP (blood pressure) < 140/90  Refilling medication. Would recommend to take in the AM>   - amLODIPine-benazepril (LOTREL) 10-40 MG capsule; Take 1 capsule by mouth daily for 90 days                  Subjective   Sujatha is a 54 year old, presenting for the following health issues:  Recheck Medication      2/15/2024    11:21 AM   Additional Questions   Roomed by Griffin ECKERT     History of Present Illness       Mental Health Follow-up:  Patient presents to follow-up on Depression & Anxiety.Patient's depression since last visit has been:  Bad  The patient is not having other symptoms associated with depression.  Patient's anxiety since last visit has  been:  Bad  The patient is not having other symptoms associated with anxiety.  Any significant life events: relationship concerns and financial concerns  Patient is feeling anxious or having panic attacks.  Patient has no concerns about alcohol or drug use.    She eats 0-1 servings of fruits and vegetables daily.She consumes 2 sweetened beverage(s) daily.She exercises with enough effort to increase her heart rate 10 to 19 minutes per day.  She exercises with enough effort to increase her heart rate 3 or less days per week.   She is not taking prescribed medications regularly due to remembering to take.     Patient reports concern with gambling addiction. Patient reports about 3 weeks ago went to [employer] and lost about 3000 dollars. Has not gone since then and was previously not gambling for 5 months.     Patient reports addiction began when she won 83196 dollars about 6 years ago and she has been seeking that high since.    Has done gambling support group. This has been helpful but has not been going recently. Feels like she does not have adeuqate support at home.    Also has underlying anxiety. Most recently on duloxetine. Did stop recently. Patient does have side effects from multiple antidepressants she has tried in the past. Patient also questioning other mental health diagnoses as she feels like she is one person at work and another at home.     Patient also with high blood pressure though reports this is taken at home and has been 130s/80s. Takes BP medication at night.               Objective           Vitals:  No vitals were obtained today due to virtual visit.    Physical Exam   GENERAL: alert and no distress  EYES: Eyes grossly normal to inspection.  No discharge or erythema, or obvious scleral/conjunctival abnormalities.  RESP: No audible wheeze, cough, or visible cyanosis.    SKIN: Visible skin clear. No significant rash, abnormal pigmentation or lesions.  NEURO: Cranial nerves grossly intact.  Mentation  and speech appropriate for age.  PSYCH: Appropriate affect, tone, and pace of words          Video-Visit Details    Type of service:  Video Visit     Originating Location (pt. Location): Other In clinic    Distant Location (provider location):  On-site  Platform used for Video Visit: Jennifer  Signed Electronically by: DANIELE JENSEN CNP

## 2024-03-05 ENCOUNTER — MYC MEDICAL ADVICE (OUTPATIENT)
Dept: INTERNAL MEDICINE | Facility: CLINIC | Age: 54
End: 2024-03-05
Payer: COMMERCIAL

## 2024-03-05 DIAGNOSIS — Z12.11 SCREENING FOR COLON CANCER: Primary | ICD-10-CM

## 2024-03-06 ENCOUNTER — ORDERS ONLY (AUTO-RELEASED) (OUTPATIENT)
Dept: INTERNAL MEDICINE | Facility: CLINIC | Age: 54
End: 2024-03-06
Payer: COMMERCIAL

## 2024-03-06 DIAGNOSIS — Z12.11 SCREENING FOR COLON CANCER: ICD-10-CM

## 2024-03-07 ENCOUNTER — ALLIED HEALTH/NURSE VISIT (OUTPATIENT)
Dept: FAMILY MEDICINE | Facility: CLINIC | Age: 54
End: 2024-03-07
Payer: COMMERCIAL

## 2024-03-07 DIAGNOSIS — J02.9 SORE THROAT: ICD-10-CM

## 2024-03-07 LAB
DEPRECATED S PYO AG THROAT QL EIA: NEGATIVE
GROUP A STREP BY PCR: NOT DETECTED

## 2024-03-07 PROCEDURE — 99207 PR NO CHARGE NURSE ONLY: CPT

## 2024-03-07 PROCEDURE — 87651 STREP A DNA AMP PROBE: CPT

## 2024-03-11 ENCOUNTER — OFFICE VISIT (OUTPATIENT)
Dept: FAMILY MEDICINE | Facility: CLINIC | Age: 54
End: 2024-03-11
Payer: COMMERCIAL

## 2024-03-11 VITALS
TEMPERATURE: 98 F | DIASTOLIC BLOOD PRESSURE: 95 MMHG | SYSTOLIC BLOOD PRESSURE: 160 MMHG | RESPIRATION RATE: 16 BRPM | OXYGEN SATURATION: 99 % | HEART RATE: 66 BPM

## 2024-03-11 DIAGNOSIS — B97.89 ACUTE VIRAL SINUSITIS: Primary | ICD-10-CM

## 2024-03-11 DIAGNOSIS — J01.90 ACUTE VIRAL SINUSITIS: Primary | ICD-10-CM

## 2024-03-11 PROCEDURE — 99213 OFFICE O/P EST LOW 20 MIN: CPT | Performed by: PHYSICIAN ASSISTANT

## 2024-03-11 ASSESSMENT — ENCOUNTER SYMPTOMS
COUGH: 1
NAUSEA: 0
SINUS PRESSURE: 1
SORE THROAT: 1
SINUS PAIN: 1
VOMITING: 0
FEVER: 0

## 2024-03-11 NOTE — PATIENT INSTRUCTIONS
Take Afrin for the next 3 days.   Drink plenty of fluids.   Steam therapy can sometimes help open your nasal passages.   Take Tylenol and Ibuprofen for pain relief.   If no improvement by Thursday then begin taking Augmentin with food.

## 2024-03-11 NOTE — PROGRESS NOTES
Patient presents with:  Cough: Cough SOB when walking to car or upstairs and all of  left side of head hurts face ear, started with sore throat on Wednesday covid test neg yesterday and strep was neg last week       Clinical Decision Making:  Sick symptoms for about 6 days.  No findings obvious consistent with bacterial infection.  Given length of time low suspicion for bacterial sinusitis at this time.  We discussed that typically time is the differentiating factor.  Recommend watchful waiting and supportive cares.  We discussed additional supportive care she could try.  She was given a postdated prescription for Augmentin in the case of no improvement.  Note given excusing absence from work.        ICD-10-CM    1. Acute viral sinusitis  J01.90 amoxicillin-clavulanate (AUGMENTIN) 875-125 MG tablet    B97.89           Patient Instructions   Take Afrin for the next 3 days.   Drink plenty of fluids.   Steam therapy can sometimes help open your nasal passages.   Take Tylenol and Ibuprofen for pain relief.   If no improvement by Thursday then begin taking Augmentin with food.     HPI:  Lawanda Katz is a 54 year old female who presents today complaining of ST that started about 1 week ago. She started having more congestion 3 days ago. She has been taking Tylenol sinus relief x 2 days; no change noted. She has also been taking Flonase.  See ROS below.  Patient had a negative strep test on 3/7/2024.    History obtained from the patient.    Problem List:  2021-01: Right ureteral stone  2018-04: Obesity (BMI 30-39.9)  2017-08: Pathological gambling  2016-03: Vitamin D deficiency  2014-06: Anxiety  2014-06: Depression  2014-06: Hypertension goal BP (blood pressure) < 140/90  2014-06: Recurrent cold sores  2014-06: S/P hysterectomy  2014-06: CARDIOVASCULAR SCREENING; LDL GOAL LESS THAN 160      Past Medical History:   Diagnosis Date    Anxiety     Depression     Hypertension        Social History     Tobacco Use     Smoking status: Never     Passive exposure: Past    Smokeless tobacco: Never   Substance Use Topics    Alcohol use: No     Alcohol/week: 0.0 standard drinks of alcohol       Review of Systems   Constitutional:  Negative for fever.   HENT:  Positive for ear pain (left), sinus pressure, sinus pain (left forhead) and sore throat. Negative for congestion.    Respiratory:  Positive for cough.    Gastrointestinal:  Negative for nausea and vomiting.       Vitals:    03/11/24 1318   BP: (!) 160/95   Pulse: 66   Resp: 16   Temp: 98  F (36.7  C)   TempSrc: Oral   SpO2: 99%       Physical Exam  Vitals and nursing note reviewed.   Constitutional:       General: She is not in acute distress.     Appearance: She is not toxic-appearing or diaphoretic.   HENT:      Head: Normocephalic and atraumatic.      Right Ear: Ear canal and external ear normal. There is impacted cerumen.      Left Ear: Tympanic membrane, ear canal and external ear normal.      Mouth/Throat:      Mouth: Mucous membranes are moist.      Pharynx: No oropharyngeal exudate or posterior oropharyngeal erythema.   Eyes:      Conjunctiva/sclera: Conjunctivae normal.   Cardiovascular:      Rate and Rhythm: Normal rate and regular rhythm.      Heart sounds: No murmur heard.  Pulmonary:      Effort: Pulmonary effort is normal. No respiratory distress.      Breath sounds: Normal breath sounds. No wheezing or rales.   Neurological:      Mental Status: She is alert.   Psychiatric:         Mood and Affect: Mood normal.         Behavior: Behavior normal.         Thought Content: Thought content normal.         Judgment: Judgment normal.         At the end of the encounter, I discussed results, diagnosis, medications. Discussed red flags for immediate return to clinic/ER, as well as indications for follow up if no improvement. Patient understood and agreed to plan. Patient was stable for discharge.

## 2024-03-11 NOTE — LETTER
March 11, 2024      Lawanda Katz  [address] [place] [address]        To Whom It May Concern:    Lawanda Katz  was seen on 3/11/2024.  Please excuse her absence from work due to illness. Expected time away is 1-4 days.         Sincerely,        Ewelina Flores PA-C

## 2024-04-17 ENCOUNTER — MYC REFILL (OUTPATIENT)
Dept: INTERNAL MEDICINE | Facility: CLINIC | Age: 54
End: 2024-04-17
Payer: COMMERCIAL

## 2024-04-17 DIAGNOSIS — F41.1 ANXIETY REACTION: ICD-10-CM

## 2024-04-17 DIAGNOSIS — M62.838 MUSCLE SPASM: ICD-10-CM

## 2024-04-17 RX ORDER — CYCLOBENZAPRINE HCL 10 MG
10 TABLET ORAL DAILY PRN
Qty: 30 TABLET | Refills: 0 | Status: SHIPPED | OUTPATIENT
Start: 2024-04-17 | End: 2024-09-18

## 2024-04-17 RX ORDER — LORAZEPAM 0.5 MG/1
TABLET ORAL
Qty: 2 TABLET | Refills: 0 | Status: SHIPPED | OUTPATIENT
Start: 2024-04-17 | End: 2024-09-18

## 2024-05-09 ENCOUNTER — LAB (OUTPATIENT)
Dept: LAB | Facility: CLINIC | Age: 54
End: 2024-05-09
Payer: COMMERCIAL

## 2024-05-09 DIAGNOSIS — R30.0 DYSURIA: ICD-10-CM

## 2024-05-09 DIAGNOSIS — R30.0 DYSURIA: Primary | ICD-10-CM

## 2024-05-09 LAB
ALBUMIN UR-MCNC: NEGATIVE MG/DL
APPEARANCE UR: CLEAR
BILIRUB UR QL STRIP: NEGATIVE
COLOR UR AUTO: YELLOW
GLUCOSE UR STRIP-MCNC: NEGATIVE MG/DL
HGB UR QL STRIP: NEGATIVE
KETONES UR STRIP-MCNC: NEGATIVE MG/DL
LEUKOCYTE ESTERASE UR QL STRIP: NEGATIVE
NITRATE UR QL: NEGATIVE
PH UR STRIP: 5.5 [PH] (ref 5–8)
SP GR UR STRIP: >=1.03 (ref 1–1.03)
UROBILINOGEN UR STRIP-ACNC: 0.2 E.U./DL

## 2024-05-09 PROCEDURE — 81003 URINALYSIS AUTO W/O SCOPE: CPT

## 2024-05-25 ENCOUNTER — HOSPITAL ENCOUNTER (EMERGENCY)
Facility: CLINIC | Age: 54
Discharge: HOME | End: 2024-05-25
Attending: EMERGENCY MEDICINE | Admitting: EMERGENCY MEDICINE
Payer: COMMERCIAL

## 2024-05-25 ENCOUNTER — APPOINTMENT (OUTPATIENT)
Dept: RADIOLOGY | Facility: CLINIC | Age: 54
End: 2024-05-25
Attending: EMERGENCY MEDICINE
Payer: COMMERCIAL

## 2024-05-25 VITALS
RESPIRATION RATE: 18 BRPM | DIASTOLIC BLOOD PRESSURE: 91 MMHG | HEIGHT: 67 IN | BODY MASS INDEX: 32.18 KG/M2 | WEIGHT: 205 LBS | SYSTOLIC BLOOD PRESSURE: 189 MMHG | OXYGEN SATURATION: 98 % | HEART RATE: 83 BPM | TEMPERATURE: 98 F

## 2024-05-25 DIAGNOSIS — S82.61XA CLOSED AVULSION FRACTURE OF LATERAL MALLEOLUS OF RIGHT FIBULA, INITIAL ENCOUNTER: ICD-10-CM

## 2024-05-25 PROCEDURE — 99283 EMERGENCY DEPT VISIT LOW MDM: CPT

## 2024-05-25 PROCEDURE — 250N000013 HC RX MED GY IP 250 OP 250 PS 637: Performed by: EMERGENCY MEDICINE

## 2024-05-25 PROCEDURE — 73610 X-RAY EXAM OF ANKLE: CPT | Mod: RT

## 2024-05-25 RX ORDER — IBUPROFEN 600 MG/1
600 TABLET, FILM COATED ORAL ONCE
Status: COMPLETED | OUTPATIENT
Start: 2024-05-25 | End: 2024-05-25

## 2024-05-25 RX ORDER — ACETAMINOPHEN 325 MG/1
650 TABLET ORAL ONCE
Status: COMPLETED | OUTPATIENT
Start: 2024-05-25 | End: 2024-05-25

## 2024-05-25 RX ORDER — OXYCODONE HYDROCHLORIDE 5 MG/1
5 TABLET ORAL EVERY 6 HOURS PRN
Qty: 12 TABLET | Refills: 0 | Status: SHIPPED | OUTPATIENT
Start: 2024-05-25 | End: 2024-05-28

## 2024-05-25 RX ADMIN — IBUPROFEN 600 MG: 600 TABLET ORAL at 16:29

## 2024-05-25 RX ADMIN — ACETAMINOPHEN 650 MG: 325 TABLET ORAL at 16:29

## 2024-05-25 ASSESSMENT — COLUMBIA-SUICIDE SEVERITY RATING SCALE - C-SSRS
1. IN THE PAST MONTH, HAVE YOU WISHED YOU WERE DEAD OR WISHED YOU COULD GO TO SLEEP AND NOT WAKE UP?: NO
6. HAVE YOU EVER DONE ANYTHING, STARTED TO DO ANYTHING, OR PREPARED TO DO ANYTHING TO END YOUR LIFE?: NO
2. HAVE YOU ACTUALLY HAD ANY THOUGHTS OF KILLING YOURSELF IN THE PAST MONTH?: NO

## 2024-05-25 NOTE — ED TRIAGE NOTES
Pt presents to the ED with c/o right ankle pain after falling down 3 stairs and twisting ankle. Denies hitting head.

## 2024-05-25 NOTE — DISCHARGE INSTRUCTIONS
You were seen in the emergency department for right ankle pain after a fall.  Your x-ray showed a very tiny avulsion fracture off the end of the fibula which is the bone on the outside of your right ankle.  We also suspect there is significant ligamentous injury in this area.  We would like you to continue using the boot for the next few weeks or until further instructed by orthopedics.  You can use crutches as needed to help get around but you can start putting weight on the ankle as soon as you are able to tolerate with pain.  We would recommend Tylenol 650 mg and ibuprofen 600 mg every 6 hours for pain relief and you can use oxycodone as needed for breakthrough pain at nighttime.  Try to keep the leg elevated at nighttime and apply ice for the next couple of days to help with swelling.  We attached the number for [facility] and you can call them this week to set up follow-up with a foot and ankle specialist.

## 2024-05-25 NOTE — ED PROVIDER NOTES
EMERGENCY DEPARTMENT ENCOUNTER      NAME: Lawanda Katz  AGE: 54 year old female  YOB: 1970  MRN: [id number]  EVALUATION DATE & TIME: 2024  4:32 PM    PCP: Alexa Terrell    ED PROVIDER: Ruperto Butler M.D.      Chief Complaint   Patient presents with    Ankle Pain         FINAL IMPRESSION:  1. Closed avulsion fracture of lateral malleolus of right fibula, initial encounter          ED COURSE & MEDICAL DECISION MAKIN year old female presents to the Emergency Department for evaluation of right ankle injury.  Patient fell down a couple of steps and injured her right ankle.  X-rays show a  tiny avulsed bone fragment off the right lateral malleolus consistent with the area of greatest pain and tenderness on her exam.  She already is equipped with a cam walker boot that she has at home which seems to fit well.  She has crutches at home as well.  Discussed a supportive treatment plan including rest ice and elevation.  She was suggested for follow-up with [facility].  Patient is in agreement with this plan and discharged in stable condition.    At the conclusion of the encounter I discussed the results of all of the tests and the disposition. The questions were answered. The patient or family acknowledged understanding and was agreeable with the care plan.       Medical Decision Making  Obtained supplemental history:Supplemental history obtained?: Family Member/Significant Other  Reviewed external records: External records reviewed?: No  Care impacted by chronic illness:Hypertension  Care significantly affected by social determinants of health:N/A  Did you consider but not order tests?: Work up considered but not performed and documented in chart, if applicable  Did you interpret images independently?: Independent interpretation of ECG and images noted in documentation, when applicable.  Consultation discussion with other provider:Did you involve another provider (consultant,  ", pharmacy, etc.)?: No  Discharge. I prescribed additional prescription strength medication(s) as charted. See documentation for any additional details.        MEDICATIONS GIVEN IN THE EMERGENCY:  Medications   acetaminophen (TYLENOL) tablet 650 mg (650 mg Oral $Given 5/25/24 1629)   ibuprofen (ADVIL/MOTRIN) tablet 600 mg (600 mg Oral $Given 5/25/24 1629)       NEW PRESCRIPTIONS STARTED AT TODAY'S ER VISIT  Discharge Medication List as of 5/25/2024  4:52 PM        START taking these medications    Details   oxyCODONE (ROXICODONE) 5 MG tablet Take 1 tablet (5 mg) by mouth every 6 hours as needed for severe pain, Disp-12 tablet, R-0, Local Print                =================================================================    HPI    Patient information was obtained from: Patient     Use of : N/A         Lawanda SANDY Katz is a 54 year old female with a pertinent history of HTN, who presents to this ED by wheelchair for evaluation of ankle pain    Patient reports that PTA she missed a step going down the stairs and twisted her right ankle. She is unsure which way her ankle twisted but notes she heard a \"pop\" noise and immediately started having right ankle pain. She also has some pain up her right leg. She denies hitting her head or any other pains. She denies Hx of ankle injuries.     REVIEW OF SYSTEMS   All systems reviewed and negative except as noted in HPI.    PAST MEDICAL HISTORY:  Past Medical History:   Diagnosis Date    Anxiety     Depression     Hypertension        PAST SURGICAL HISTORY:  Past Surgical History:   Procedure Laterality Date    ADENOIDECTOMY      CYSTOSCOPY  2007    HYSTERECTOMY, PAP NO LONGER INDICATED  2010    BSO, cervix removed, for dysmenorrhea    TUBAL LIGATION  1999    US BREAST BIOPSY LT  1/2013    left, benign           CURRENT MEDICATIONS:    No current facility-administered medications for this encounter.     Current Outpatient Medications   Medication Sig Dispense Refill "    oxyCODONE (ROXICODONE) 5 MG tablet Take 1 tablet (5 mg) by mouth every 6 hours as needed for severe pain 12 tablet 0    albuterol (PROAIR HFA/PROVENTIL HFA/VENTOLIN HFA) 108 (90 Base) MCG/ACT inhaler Inhale 2 puffs into the lungs every 6 hours as needed for shortness of breath, wheezing or cough (Patient not taking: Reported on 3/11/2024) 18 g 1    amLODIPine-benazepril (LOTREL) 10-40 MG capsule Take 1 capsule by mouth daily for 90 days 90 capsule 0    cyclobenzaprine (FLEXERIL) 10 MG tablet Take 1 tablet (10 mg) by mouth daily as needed for muscle spasms 30 tablet 0    DULoxetine (CYMBALTA) 30 MG capsule Take 1 capsule (30 mg) by mouth daily for 30 days (Patient not taking: Reported on 3/11/2024) 30 capsule 0    hydrocortisone, Perianal, (HYDROCORTISONE) 2.5 % cream Place rectally 2 times daily as needed for hemorrhoids (Patient not taking: Reported on 3/11/2024) 30 g 1    hydrOXYzine (ATARAX) 25 MG tablet Take 1-2 tablets (25-50 mg) by mouth every 6 hours as needed for anxiety (Patient not taking: Reported on 3/11/2024) 60 tablet 1    LORazepam (ATIVAN) 0.5 MG tablet Take 1 or 2 tablets an hour before dental procedure (you will need to get a ride home). Can also take 1-2 tablets to relieve anxiety while on airplane.  May be used twice in 1 day. 2 tablet 0    valACYclovir (VALTREX) 1000 mg tablet Take 1 tablet (1,000 mg) by mouth daily (Patient not taking: Reported on 3/11/2024) 90 tablet 0         ALLERGIES:  Allergies   Allergen Reactions    Hydrochlorothiazide Nausea and Diarrhea    Metronidazole     Wellbutrin [Bupropion]      Tingling sensation      Morphine Rash       FAMILY HISTORY:  Family History   Problem Relation Age of Onset    Heart Disease Mother     Unknown/Adopted No family hx of        SOCIAL HISTORY:   Social History     Socioeconomic History    Marital status: Single   Tobacco Use    Smoking status: Never     Passive exposure: Past    Smokeless tobacco: Never   Vaping Use    Vaping status:  "Never Used   Substance and Sexual Activity    Alcohol use: No     Alcohol/week: 0.0 standard drinks of alcohol    Drug use: No    Sexual activity: Yes     Partners: Male     Social Determinants of Health     Financial Resource Strain: Low Risk  (11/10/2023)    Financial Resource Strain     Within the past 12 months, have you or your family members you live with been unable to get utilities (heat, electricity) when it was really needed?: No   Food Insecurity: Low Risk  (11/10/2023)    Food Insecurity     Within the past 12 months, did you worry that your food would run out before you got money to buy more?: No     Within the past 12 months, did the food you bought just not last and you didn t have money to get more?: No   Transportation Needs: Low Risk  (11/10/2023)    Transportation Needs     Within the past 12 months, has lack of transportation kept you from medical appointments, getting your medicines, non-medical meetings or appointments, work, or from getting things that you need?: No    Received from [facility], [facility]    Social Connections   Interpersonal Safety: Low Risk  (11/10/2023)    Interpersonal Safety     Do you feel physically and emotionally safe where you currently live?: Yes     Within the past 12 months, have you been hit, slapped, kicked or otherwise physically hurt by someone?: No     Within the past 12 months, have you been humiliated or emotionally abused in other ways by your partner or ex-partner?: No   Housing Stability: Low Risk  (11/10/2023)    Housing Stability     Do you have housing? : Yes     Are you worried about losing your housing?: No       VITALS:  BP (!) 189/91   Pulse 83   Temp 98  F (36.7  C) (Temporal)   Resp 18   Ht 1.702 m (5' 7\")   Wt 93 kg (205 lb)   LMP  (LMP Unknown)   SpO2 98%   BMI 32.11 kg/m      PHYSICAL EXAM    Constitutional: Well developed, Well nourished, NAD.  HENT: Normocephalic, " Atraumatic. Neck Supple.  Eyes: EOMI, Conjunctiva normal.  Respiratory: Breathing comfortably on room air. Speaks full sentences easily. Lungs clear to ascultation.  Cardiovascular: Normal heart rate, Regular rhythm. No peripheral edema.  Abdomen: Soft  Musculoskeletal: Good range of motion in all major joints. No major deformities noted.  There is mild swelling to the right ankle and pain with passive range of motion.  No bony tenderness along the remainder of the more proximal lower leg or foot.  Pain and tenderness is greatest at the right lateral malleolus.  No visible deformity.  Integument: Warm, Dry.  Neurologic: Alert & awake, Normal motor function, Normal sensory function, No focal deficits noted.   Psychiatric: Cooperative. Affect appropriate.     LAB:  All pertinent labs reviewed and interpreted.  Labs Ordered and Resulted from Time of ED Arrival to Time of ED Departure - No data to display    RADIOLOGY:  Reviewed all pertinent imaging. Please see official radiology report.  Ankle XR, G/E 3 views, right   Final Result   IMPRESSION: Small linear bone fragment beneath the tip of the lateral malleolus measuring 3 mm in keeping with a fracture fragment, possibly acute given there is soft tissue swelling adjacent to this area. The ankle mortise is intact and joint spaces are    maintained.              I, Benito Doyle, am serving as a scribe to document services personally performed by Dr. Ruperto Butler, based on my observation and the provider's statements to me. I, Ruperto Butler MD attest that Benito Doyle is acting in a scribe capacity, has observed my performance of the services and has documented them in accordance with my direction.    Ruperto Butler M.D.  Emergency Medicine  [facility] EMERGENCY ROOM  [address] [address]  [phone]  Dept: [phone]       Ruperto Butler MD  05/25/24 6810

## 2024-06-03 ENCOUNTER — OFFICE VISIT (OUTPATIENT)
Dept: ORTHOPEDICS | Facility: CLINIC | Age: 54
End: 2024-06-03
Payer: COMMERCIAL

## 2024-06-03 ENCOUNTER — ANCILLARY PROCEDURE (OUTPATIENT)
Dept: GENERAL RADIOLOGY | Facility: CLINIC | Age: 54
End: 2024-06-03
Attending: STUDENT IN AN ORGANIZED HEALTH CARE EDUCATION/TRAINING PROGRAM
Payer: COMMERCIAL

## 2024-06-03 VITALS — SYSTOLIC BLOOD PRESSURE: 144 MMHG | DIASTOLIC BLOOD PRESSURE: 84 MMHG

## 2024-06-03 DIAGNOSIS — S93.491A HIGH ANKLE SPRAIN OF RIGHT LOWER EXTREMITY, INITIAL ENCOUNTER: Primary | ICD-10-CM

## 2024-06-03 DIAGNOSIS — M25.571 ACUTE RIGHT ANKLE PAIN: ICD-10-CM

## 2024-06-03 PROCEDURE — 99204 OFFICE O/P NEW MOD 45 MIN: CPT | Performed by: STUDENT IN AN ORGANIZED HEALTH CARE EDUCATION/TRAINING PROGRAM

## 2024-06-03 PROCEDURE — 73610 X-RAY EXAM OF ANKLE: CPT | Mod: TC | Performed by: RADIOLOGY

## 2024-06-03 NOTE — PROGRESS NOTES
ASSESSMENT & PLAN    Sujatha was seen today for pain.    Diagnoses and all orders for this visit:    High ankle sprain of right lower extremity, initial encounter  -     XR Ankle RT G/E 3 vw; Future  -     Cancel: Ankle/Foot Bracing Supplies Order Walking Boot; Right; Pneumatic; Tall      High ankle sprain after fall down steps 5/25/2024 with pain at ATFL, syndesmosis and deltoid ligament. Swelling improving with compression but still with pain weight bearing. Will continue to treat nonsurgically at this time as no signs of instability on x-ray or exam.  -Will recommend walking boot and weightbearing as tolerated.  If unable to weight-bear in boot would prefer crutches that she has at home and transition back to walking boot. Initially ordered boot but cancelled as she has boot already to use. Can reconsider if needs new size.   -Tylenol 500-1000mg (up to three times per day) and ibuprofen 600mg (up to three times per day) as needed for pain and swelling. Always take ibuprofen with food.    Plan to transition out of boot over the next 2 to 4 weeks.  Follow-up 2 to 4 weeks      P - prevent further injury.  R - rest affected area.  I - ice applied 20 minutes on/40 minutes off throughout the day, especially for first 48 hours. Do not apply ice directly to skin - wrap ice in thin towel or pillow case.   C - compression of injured area (ACE wrap, splint).  E - elevated affected area.      Janicesa King VIDES  [facility]    -----  Chief Complaint   Patient presents with    Right Ankle - Pain       SUBJECTIVE  Lawanda DELGADO Sterling is a/an 54 year old female who is seen as a self referral for evaluation of right ankle pain.     The patient is seen by themselves.    Onset: 1 week(s) ago, 5/25/24. Patient describes injury as she fell down two steps at home  Location of Pain: right lateral ankle near AFTL and CFL and medial ankle near deltoid ligament.   Worsened by: weightbearing,  walking, and when she is not wearing her compression.  Better with: compression sleeve, short walking boot, ibuprofen and tylenol  Treatments tried: elevation, ice, and walking boot, compression sleeve, ibuprofen, tylenol,   Associated symptoms: swelling and burning pain of whole foot, intense ache with walking  Denies numbness, tingling, locking  Orthopedic/Surgical history: NO  Social History/Occupation:  at [employer], usually seated 90% of shift.     Patient Active Problem List   Diagnosis    Hypertension goal BP (blood pressure) < 140/90    Recurrent cold sores    S/P hysterectomy    CARDIOVASCULAR SCREENING; LDL GOAL LESS THAN 160    Anxiety    Vitamin D deficiency    Pathological gambling    Obesity (BMI 30-39.9)    Right ureteral stone       Current Outpatient Medications   Medication Sig Dispense Refill    albuterol (PROAIR HFA/PROVENTIL HFA/VENTOLIN HFA) 108 (90 Base) MCG/ACT inhaler Inhale 2 puffs into the lungs every 6 hours as needed for shortness of breath, wheezing or cough (Patient not taking: Reported on 3/11/2024) 18 g 1    amLODIPine-benazepril (LOTREL) 10-40 MG capsule Take 1 capsule by mouth daily for 90 days 90 capsule 0    cyclobenzaprine (FLEXERIL) 10 MG tablet Take 1 tablet (10 mg) by mouth daily as needed for muscle spasms 30 tablet 0    DULoxetine (CYMBALTA) 30 MG capsule Take 1 capsule (30 mg) by mouth daily for 30 days (Patient not taking: Reported on 3/11/2024) 30 capsule 0    hydrocortisone, Perianal, (HYDROCORTISONE) 2.5 % cream Place rectally 2 times daily as needed for hemorrhoids (Patient not taking: Reported on 3/11/2024) 30 g 1    hydrOXYzine (ATARAX) 25 MG tablet Take 1-2 tablets (25-50 mg) by mouth every 6 hours as needed for anxiety (Patient not taking: Reported on 3/11/2024) 60 tablet 1    LORazepam (ATIVAN) 0.5 MG tablet Take 1 or 2 tablets an hour before dental procedure (you will need to get a ride home). Can also take 1-2 tablets to relieve anxiety while on  airplane.  May be used twice in 1 day. 2 tablet 0    valACYclovir (VALTREX) 1000 mg tablet Take 1 tablet (1,000 mg) by mouth daily (Patient not taking: Reported on 3/11/2024) 90 tablet 0       PMH, Medications and Allergies were reviewed and updated as needed.    REVIEW OF SYSTEMS:  10 point ROS is negative other than symptoms noted above in HPI        OBJECTIVE:  BP (!) 144/84   LMP  (LMP Unknown)    General: healthy, alert and in no distress  Skin: no suspicious lesions or rash.  CV: distal perfusion intact RLE  Resp: normal respiratory effort without conversational dyspnea   Psych: normal mood and affect  Gait: antalgic  Neuro: Normal light sensory exam of RLE    RIGHT ANKLE  Inspection:  + ecchymosis lateral malleolus  +   Palpation:    Tender about the ATFL, CFL, PTFL, lateral malleolus, deltoid ligament, and anterior tib-fib ligament. Remainder of bony and ligamentous landmarks are nontender.  Range of Motion:     Plantarflexion limited by pain / dorsiflexion limited by pain / inversion limited by pain / eversion limited by pain  Strength:    full  Special Tests:    positive anterior drawer, postitive valgus stress, positive forced external rotation/eversion, negative squeeze test. Unable to perform heel raise and Unable to hop.        RADIOLOGY:  Final results and radiologist's interpretation, available in the [facility] health record.  Images were reviewed with the patient in the office today.    My personal interpretation of the performed imaging:     Ankle Xrays 6/3/2024:   No acute fracture or dislocation  Symmetric ankle mortise  Await final radiology read            45 minutes spent by me on the date of the encounter doing chart review, review of outside records, review of test results, interpretation of tests, patient visit, and documentation          Disclaimer: This note consists of symbols derived from keyboarding, dictation and/or voice recognition software. As a result, there may be errors in the  script that have gone undetected. Please consider this when interpreting information found in this chart.

## 2024-06-03 NOTE — PATIENT INSTRUCTIONS
1. Acute right ankle pain      -High ankle sprain after fall down steps 5/25/2024  -Will continue to treat nonsurgically at this time as no signs of instability on x-ray or exam.  -Will recommend tall walking boot and weightbearing as tolerated.  If unable to weight-bear in boot would prefer crutches that she has at home and transition back to walking boot.   -Tylenol 500-1000mg (up to three times per day) and ibuprofen 600mg (up to three times per day) as needed for pain and swelling. Always take ibuprofen with food.    Plan to transition out of boot over the next 2 to 4 weeks.  Follow-up 2 to 4 weeks      P - prevent further injury.  R - rest affected area.  I - ice applied 20 minutes on/40 minutes off throughout the day, especially for first 48 hours. Do not apply ice directly to skin - wrap ice in thin towel or pillow case.   C - compression of injured area (ACE wrap, splint).  E - elevated affected area.       Please call [phone]  Ask for my team if you have any questions or concerns    Janice Malik DO  [facility]      Thank you for choosing [facility]!    CLINIC LOCATIONS:     [place]  TRIAGE LINE: [phone] [address] APPOINTMENTS: [phone]   [place] [address] RADIOLOGY: [phone]   (Monday, Thursday & Friday) PHYSICAL THERAPY: [phone]    BILLING QUESTIONS: [phone]   [place] FAX: [phone] [address]    [place] [address]    (Wednesday)

## 2024-06-03 NOTE — LETTER
6/3/2024         RE: Lawanda Katz  [address]  [place] [address]        Dear Colleague,    Thank you for referring your patient, Lwaanda Katz, to the [facility]. Please see a copy of my visit note below.    ASSESSMENT & PLAN    Sujatha was seen today for pain.    Diagnoses and all orders for this visit:    High ankle sprain of right lower extremity, initial encounter  -     XR Ankle RT G/E 3 vw; Future  -     Cancel: Ankle/Foot Bracing Supplies Order Walking Boot; Right; Pneumatic; Tall      High ankle sprain after fall down steps 5/25/2024 with pain at ATFL, syndesmosis and deltoid ligament. Swelling improving with compression but still with pain weight bearing. Will continue to treat nonsurgically at this time as no signs of instability on x-ray or exam.  -Will recommend walking boot and weightbearing as tolerated.  If unable to weight-bear in boot would prefer crutches that she has at home and transition back to walking boot. Initially ordered boot but cancelled as she has boot already to use. Can reconsider if needs new size.   -Tylenol 500-1000mg (up to three times per day) and ibuprofen 600mg (up to three times per day) as needed for pain and swelling. Always take ibuprofen with food.    Plan to transition out of boot over the next 2 to 4 weeks.  Follow-up 2 to 4 weeks      P - prevent further injury.  R - rest affected area.  I - ice applied 20 minutes on/40 minutes off throughout the day, especially for first 48 hours. Do not apply ice directly to skin - wrap ice in thin towel or pillow case.   C - compression of injured area (ACE wrap, splint).  E - elevated affected area.      Janice Malik DO  [facility]    -----  Chief Complaint   Patient presents with     Right Ankle - Pain       SUBJECTIVE  Lawanda Katz is a/an 54 year old female who is seen as a self referral for evaluation  of right ankle pain.     The patient is seen by themselves.    Onset: 1 week(s) ago, 5/25/24. Patient describes injury as she fell down two steps at home  Location of Pain: right lateral ankle near AFTL and CFL and medial ankle near deltoid ligament.   Worsened by: weightbearing, walking, and when she is not wearing her compression.  Better with: compression sleeve, short walking boot, ibuprofen and tylenol  Treatments tried: elevation, ice, and walking boot, compression sleeve, ibuprofen, tylenol,   Associated symptoms: swelling and burning pain of whole foot, intense ache with walking  Denies numbness, tingling, locking  Orthopedic/Surgical history: NO  Social History/Occupation:  at [employer], usually seated 90% of shift.     Patient Active Problem List   Diagnosis     Hypertension goal BP (blood pressure) < 140/90     Recurrent cold sores     S/P hysterectomy     CARDIOVASCULAR SCREENING; LDL GOAL LESS THAN 160     Anxiety     Vitamin D deficiency     Pathological gambling     Obesity (BMI 30-39.9)     Right ureteral stone       Current Outpatient Medications   Medication Sig Dispense Refill     albuterol (PROAIR HFA/PROVENTIL HFA/VENTOLIN HFA) 108 (90 Base) MCG/ACT inhaler Inhale 2 puffs into the lungs every 6 hours as needed for shortness of breath, wheezing or cough (Patient not taking: Reported on 3/11/2024) 18 g 1     amLODIPine-benazepril (LOTREL) 10-40 MG capsule Take 1 capsule by mouth daily for 90 days 90 capsule 0     cyclobenzaprine (FLEXERIL) 10 MG tablet Take 1 tablet (10 mg) by mouth daily as needed for muscle spasms 30 tablet 0     DULoxetine (CYMBALTA) 30 MG capsule Take 1 capsule (30 mg) by mouth daily for 30 days (Patient not taking: Reported on 3/11/2024) 30 capsule 0     hydrocortisone, Perianal, (HYDROCORTISONE) 2.5 % cream Place rectally 2 times daily as needed for hemorrhoids (Patient not taking: Reported on 3/11/2024) 30 g 1     hydrOXYzine (ATARAX) 25 MG tablet Take 1-2  tablets (25-50 mg) by mouth every 6 hours as needed for anxiety (Patient not taking: Reported on 3/11/2024) 60 tablet 1     LORazepam (ATIVAN) 0.5 MG tablet Take 1 or 2 tablets an hour before dental procedure (you will need to get a ride home). Can also take 1-2 tablets to relieve anxiety while on airplane.  May be used twice in 1 day. 2 tablet 0     valACYclovir (VALTREX) 1000 mg tablet Take 1 tablet (1,000 mg) by mouth daily (Patient not taking: Reported on 3/11/2024) 90 tablet 0       PMH, Medications and Allergies were reviewed and updated as needed.    REVIEW OF SYSTEMS:  10 point ROS is negative other than symptoms noted above in HPI        OBJECTIVE:  BP (!) 144/84   LMP  (LMP Unknown)    General: healthy, alert and in no distress  Skin: no suspicious lesions or rash.  CV: distal perfusion intact RLE  Resp: normal respiratory effort without conversational dyspnea   Psych: normal mood and affect  Gait: antalgic  Neuro: Normal light sensory exam of RLE    RIGHT ANKLE  Inspection:  + ecchymosis lateral malleolus  +   Palpation:    Tender about the ATFL, CFL, PTFL, lateral malleolus, deltoid ligament, and anterior tib-fib ligament. Remainder of bony and ligamentous landmarks are nontender.  Range of Motion:     Plantarflexion limited by pain / dorsiflexion limited by pain / inversion limited by pain / eversion limited by pain  Strength:    full  Special Tests:    positive anterior drawer, postitive valgus stress, positive forced external rotation/eversion, negative squeeze test. Unable to perform heel raise and Unable to hop.        RADIOLOGY:  Final results and radiologist's interpretation, available in the [facility] health record.  Images were reviewed with the patient in the office today.    My personal interpretation of the performed imaging:     Ankle Xrays 6/3/2024:   No acute fracture or dislocation  Symmetric ankle mortise  Await final radiology read            45 minutes spent by me on the date of the  encounter doing chart review, review of outside records, review of test results, interpretation of tests, patient visit, and documentation          Disclaimer: This note consists of symbols derived from keyboarding, dictation and/or voice recognition software. As a result, there may be errors in the script that have gone undetected. Please consider this when interpreting information found in this chart.       Again, thank you for allowing me to participate in the care of your patient.        Sincerely,        Janice Malik, DO

## 2024-06-20 ENCOUNTER — MYC MEDICAL ADVICE (OUTPATIENT)
Dept: ORTHOPEDICS | Facility: CLINIC | Age: 54
End: 2024-06-20
Payer: COMMERCIAL

## 2024-06-20 DIAGNOSIS — S93.491A HIGH ANKLE SPRAIN OF RIGHT LOWER EXTREMITY, INITIAL ENCOUNTER: Primary | ICD-10-CM

## 2024-06-20 NOTE — TELEPHONE ENCOUNTER
Patient last seen 6/3/24 for high ankle sprain. She was advised to follow up in 2-4 weeks but  has not yet scheduled follow up. Please see patient MyChart and advise if MRI is reasonable next step, or if clinic follow up is needed first.     Valentina Willis MSA, ATC  Certified Athletic Trainer    Her/She

## 2024-06-25 ENCOUNTER — LAB (OUTPATIENT)
Dept: LAB | Facility: CLINIC | Age: 54
End: 2024-06-25
Payer: COMMERCIAL

## 2024-06-25 ENCOUNTER — E-VISIT (OUTPATIENT)
Dept: URGENT CARE | Facility: CLINIC | Age: 54
End: 2024-06-25
Payer: COMMERCIAL

## 2024-06-25 DIAGNOSIS — J02.9 SORE THROAT: ICD-10-CM

## 2024-06-25 DIAGNOSIS — J02.9 SORE THROAT: Primary | ICD-10-CM

## 2024-06-25 LAB
DEPRECATED S PYO AG THROAT QL EIA: NEGATIVE
GROUP A STREP BY PCR: NOT DETECTED

## 2024-06-25 PROCEDURE — 99207 PR NON-BILLABLE SERV PER CHARTING: CPT | Performed by: FAMILY MEDICINE

## 2024-06-25 PROCEDURE — 87651 STREP A DNA AMP PROBE: CPT

## 2024-06-25 NOTE — PATIENT INSTRUCTIONS
Dear Sujatha,    After reviewing your responses, I would like you to come in for a swab to make sure we treat you correctly. This swab is to evaluate you for possible Strep Throat, and should be scheduled for today or tomorrow. Please use the Schedule Now button in [employer] to schedule your swab. Otherwise, click this link to schedule a lab only appointment.    Lab appointments are not available at most locations on the weekends. If no Lab Only appointment is available, you should be seen in any of our convenient Urgent Care Centers for an in person visit, which can be found on our website here.    You will receive instructions with your results in [employer] once they are available.     If your symptoms worsen, you develop difficulty breathing, difficulty with drinking enough to stay hydrated, difficulty swallowing your saliva or have fevers for more than 5 days, please contact your primary care provider for an appointment or visit an Urgent Care Center to be seen.      Thanks again for choosing us as your health care partner.   DANIELE ESTRADA CNP  Sore Throat: Care Instructions  Overview     Infection by bacteria or a virus causes most sore throats. Cigarette smoke, dry air, air pollution, allergies, and yelling can also cause a sore throat. Sore throats can be painful and annoying. Fortunately, most sore throats go away on their own. If you have a bacterial infection, your doctor may prescribe antibiotics.  Follow-up care is a key part of your treatment and safety. Be sure to make and go to all appointments, and call your doctor if you are having problems. It's also a good idea to know your test results and keep a list of the medicines you take.  How can you care for yourself at home?  If your doctor prescribed antibiotics, take them as directed. Do not stop taking them just because you feel better. You need to take the full course of antibiotics.  Gargle with warm salt water several times a day to help reduce  "swelling and relieve pain. Mix 1/2 teaspoon of salt in 1 cup of warm water.  Take an over-the-counter pain medicine, such as acetaminophen (Tylenol), ibuprofen (Advil, Motrin), or naproxen (Aleve). Read and follow all instructions on the label.  Be careful when taking over-the-counter cold or flu medicines and Tylenol at the same time. Many of these medicines have acetaminophen, which is Tylenol. Read the labels to make sure that you are not taking more than the recommended dose. Too much acetaminophen (Tylenol) can be harmful.  Drink plenty of fluids. Fluids may help soothe an irritated throat. Hot fluids, such as tea or soup, may help decrease throat pain.  Use over-the-counter throat lozenges to soothe pain. Regular cough drops or hard candy may also help. These should not be given to young children because of the risk of choking.  Do not smoke or allow others to smoke around you. If you need help quitting, talk to your doctor about stop-smoking programs and medicines. These can increase your chances of quitting for good.  Use a vaporizer or humidifier to add moisture to your bedroom. Follow the directions for cleaning the machine.  When should you call for help?   Call your doctor now or seek immediate medical care if:    You have trouble breathing.     Your sore throat gets much worse on one side.     You have new or worse trouble swallowing.     You have a new or higher fever.   Watch closely for changes in your health, and be sure to contact your doctor if you do not get better as expected.  Where can you learn more?  Go to https://www.[employer].net/patiented  Enter U420 in the search box to learn more about \"Sore Throat: Care Instructions.\"  Current as of: September 27, 2023               Content Version: 14.0    2999-0540 [employer].   Care instructions adapted under license by your healthcare professional. If you have questions about a medical condition or this instruction, always ask your " healthcare professional. [employer] disclaims any warranty or liability for your use of this information.

## 2024-07-01 ENCOUNTER — OFFICE VISIT (OUTPATIENT)
Dept: INTERNAL MEDICINE | Facility: CLINIC | Age: 54
End: 2024-07-01
Payer: COMMERCIAL

## 2024-07-01 VITALS
HEIGHT: 67 IN | TEMPERATURE: 97.9 F | DIASTOLIC BLOOD PRESSURE: 88 MMHG | BODY MASS INDEX: 32.33 KG/M2 | RESPIRATION RATE: 16 BRPM | HEART RATE: 73 BPM | WEIGHT: 206 LBS | OXYGEN SATURATION: 99 % | SYSTOLIC BLOOD PRESSURE: 130 MMHG

## 2024-07-01 DIAGNOSIS — L91.8 SKIN TAG: ICD-10-CM

## 2024-07-01 DIAGNOSIS — I10 HYPERTENSION GOAL BP (BLOOD PRESSURE) < 140/90: ICD-10-CM

## 2024-07-01 DIAGNOSIS — J02.9 SORE THROAT: ICD-10-CM

## 2024-07-01 DIAGNOSIS — Z00.00 ENCOUNTER FOR MEDICAL EXAMINATION TO ESTABLISH CARE: Primary | ICD-10-CM

## 2024-07-01 LAB
DEPRECATED S PYO AG THROAT QL EIA: NEGATIVE
GROUP A STREP BY PCR: NOT DETECTED

## 2024-07-01 PROCEDURE — 11200 RMVL SKIN TAGS UP TO&INC 15: CPT | Performed by: NURSE PRACTITIONER

## 2024-07-01 PROCEDURE — 87651 STREP A DNA AMP PROBE: CPT | Performed by: NURSE PRACTITIONER

## 2024-07-01 PROCEDURE — 99214 OFFICE O/P EST MOD 30 MIN: CPT | Mod: 25 | Performed by: NURSE PRACTITIONER

## 2024-07-01 RX ORDER — AMLODIPINE AND BENAZEPRIL HYDROCHLORIDE 10; 40 MG/1; MG/1
1 CAPSULE ORAL DAILY
Qty: 90 CAPSULE | Refills: 1 | Status: SHIPPED | OUTPATIENT
Start: 2024-07-01

## 2024-07-01 ASSESSMENT — PATIENT HEALTH QUESTIONNAIRE - PHQ9
SUM OF ALL RESPONSES TO PHQ QUESTIONS 1-9: 5
SUM OF ALL RESPONSES TO PHQ QUESTIONS 1-9: 5
10. IF YOU CHECKED OFF ANY PROBLEMS, HOW DIFFICULT HAVE THESE PROBLEMS MADE IT FOR YOU TO DO YOUR WORK, TAKE CARE OF THINGS AT HOME, OR GET ALONG WITH OTHER PEOPLE: SOMEWHAT DIFFICULT

## 2024-07-01 NOTE — PATIENT INSTRUCTIONS
Due for annual exam this fall.  We will recheck her blood pressure at that time.  Check your blood pressure a few times a week.  Write this number down and bring your readings with you to your next appointment.    We will repeat the strep test today.  Will let you know these results once available.  If negative sore throats are likely related to something viral or could even be allergies.  Benadryl at bedtime for a few nights can be helpful or Claritin once daily for couple weeks.  Warm salt water gargles can also help with sore throats and with honey on a spoon.    For tonsil stones in the future a water floss or/Waterpik can be helpful to remove the stones without creating increased irritation to the throat.      I removed a skin tag from your abdomen.  You may notice a little bit of blood after removal but this will be just a very small amount.  Will scab over.  You can wash this area as usual and put a little antibiotic ointment on there until scab forms.    Let me know anytime you have questions or concerns.

## 2024-07-01 NOTE — PROGRESS NOTES
Assessment & Plan     Hypertension goal BP (blood pressure) < 140/90  Blood pressure remains slightly elevated at 146/84 at the start of the visit.  Recheck at the end of the visit so to blood pressure had improved slightly at 130/88.  I discussed the importance of trying to remember to take her blood pressure medication daily.  I will have her monitor her blood pressure and write this down and bring the readings with her to her next appointment.  She is due for an annual exam this fall so at that time we will recheck her blood pressure and look at her readings from home.   - amLODIPine-benazepril (LOTREL) 10-40 MG capsule; Take 1 capsule by mouth daily    Sore throat  As symptoms have changed over the weekend and worsened slightly but we will repeat the strep today.  There is no findings on physical exam to indicate need for antibiotics.  Discussed symptoms may be related to a viral pharyngitis or possibly even an allergic cause.  Recommend warm salt water gargles, Tylenol and ibuprofen as needed for pain and trying an antihistamine.  For tonsil stones in the future I do recommend trying to remove these with a water floss or rather than trying to scrape them out.  Prescription in the mail can lead to an increased irritated throat.  - Streptococcus A Rapid Screen w/Reflex to PCR - Clinic Collect    Skin tag  Risk and benefits of procedure was reviewed.  Verbal Consent obtained.  Using sterile iris scissors single skin tags were snipped off at the bases after being cleansed with Betadine.  Bleeding was controlled by pressure.  Anesthetic was not required.These pathognomonic benign lesions were not sent for pathological exam.  Procedure was well-tolerated.  Patient will be alert for any signs of continuous infection and call if there is any questions or concerns.  Follow-up as needed.  -Removal of skin tags, first 15    Encounter for medical examination to establish care            BMI  Estimated body mass index is  "32.26 kg/m  as calculated from the following:    Height as of this encounter: 1.702 m (5' 7\").    Weight as of this encounter: 93.4 kg (206 lb).     Fredi Solares is a 54 year old, presenting for the following health issues: She is looking to establish care.  Her previous provider is no longer here at the clinic.  Is also needing her blood pressure checks.  States her blood pressure stays around 140/80 at home.  She typically checks her blood pressure a couple times a week.  Is currently on amlodipine-benazepril combination medication daily.  States she can sometimes forget to take this medication.  On those days she will notice her blood pressure will be elevated.     Has had a sore throat for 1 week.  Was seen last week and had a negative strep test.  Sore throat worsened over the weekend.  Feels like she is swallowing razor blades.  Shards of glass.  Has been afebrile.  No cough or congestion.  Noticed a tonsil stone on the left side over the weekend and was able to remove this by scraping it out.  Not sure if this caused increased pain but states her tonsil stones have never caused pain for her in the past.    She also has a skin tag to her left lower abdominal/hip area.  It has been there for quite some time.  States is pretty large and is bothered by where her pants in her underwear line set.  Would like this removed today.  Establish Care (Blood pressure check)      7/1/2024     4:47 PM   Additional Questions   Roomed by Jade RASHID     History of Present Illness       Hypertension: She presents for follow up of hypertension.  She does check blood pressure  regularly outside of the clinic. Outside blood pressures have been over 140/90. She does not follow a low salt diet.     She eats 0-1 servings of fruits and vegetables daily.She consumes 2 sweetened beverage(s) daily.She exercises with enough effort to increase her heart rate 20 to 29 minutes per day.  She exercises with enough effort to increase her " "heart rate 3 or less days per week. She is missing 4 dose(s) of medications per week.       ROS  Comprehensive 12-point review of systems was completed and negative except as noted in HPI.        Objective    BP (!) 146/84 (BP Location: Right arm, Patient Position: Sitting)   Pulse 73   Temp 97.9  F (36.6  C)   Resp 16   Ht 1.702 m (5' 7\")   Wt 93.4 kg (206 lb)   LMP  (LMP Unknown)   SpO2 99%   BMI 32.26 kg/m    Body mass index is 32.26 kg/m .  Physical Exam   Constitutional: In no acute distress.  Clean appearance.  Does not appear acutely ill.  Ears: TMs without erythema or effusions.  Grossly normal hearing.   Nose:   No rhinorrhea.  No frontal or maxillary sinus tenderness.  Oropharynx: Mild erythema.  Posterior oropharyngeal cobblestoning noted.  Neck: Supple.  No thyromegaly or lymphadenopathy noted.  Cardiovascular: Regular rate and rhythm.   Respiratory: Normal respiratory effort.  Lung sounds clear throughout on auscultation.  Skin: Skin is pink, warm and dry.  No rashes.   Classic skin tag noted to LLQ/hip area.   Musculoskeletal: Gait normal.  Able to mount exam table without difficulties.  Psychiatric: Appropriate affect and demeanor.           Signed Electronically by: Ashley Orta NP    "

## 2024-07-05 ENCOUNTER — MYC MEDICAL ADVICE (OUTPATIENT)
Dept: INTERNAL MEDICINE | Facility: CLINIC | Age: 54
End: 2024-07-05
Payer: COMMERCIAL

## 2024-07-05 DIAGNOSIS — T14.8XXA WOUND INFECTION: Primary | ICD-10-CM

## 2024-07-05 DIAGNOSIS — L08.9 WOUND INFECTION: Primary | ICD-10-CM

## 2024-07-08 RX ORDER — MUPIROCIN 20 MG/G
OINTMENT TOPICAL 3 TIMES DAILY
Qty: 15 G | Refills: 0 | Status: SHIPPED | OUTPATIENT
Start: 2024-07-08 | End: 2024-09-18

## 2024-07-17 ENCOUNTER — HOSPITAL ENCOUNTER (OUTPATIENT)
Dept: MRI IMAGING | Facility: CLINIC | Age: 54
Discharge: HOME | End: 2024-07-17
Attending: STUDENT IN AN ORGANIZED HEALTH CARE EDUCATION/TRAINING PROGRAM | Admitting: STUDENT IN AN ORGANIZED HEALTH CARE EDUCATION/TRAINING PROGRAM
Payer: COMMERCIAL

## 2024-07-17 DIAGNOSIS — S93.491A HIGH ANKLE SPRAIN OF RIGHT LOWER EXTREMITY, INITIAL ENCOUNTER: ICD-10-CM

## 2024-07-17 PROCEDURE — 73721 MRI JNT OF LWR EXTRE W/O DYE: CPT | Mod: RT

## 2024-08-06 LAB — NONINV COLON CA DNA+OCC BLD SCRN STL QL: NEGATIVE

## 2024-08-19 ENCOUNTER — E-VISIT (OUTPATIENT)
Dept: URGENT CARE | Facility: URGENT CARE | Age: 54
End: 2024-08-19
Payer: COMMERCIAL

## 2024-08-19 DIAGNOSIS — J01.90 ACUTE BACTERIAL SINUSITIS: Primary | ICD-10-CM

## 2024-08-19 DIAGNOSIS — B96.89 ACUTE BACTERIAL SINUSITIS: Primary | ICD-10-CM

## 2024-08-19 PROCEDURE — 99421 OL DIG E/M SVC 5-10 MIN: CPT | Performed by: PHYSICIAN ASSISTANT

## 2024-08-19 NOTE — PATIENT INSTRUCTIONS
Acute Sinusitis: Care Instructions  Overview     Acute sinusitis is an inflammation of the mucous membranes inside the nose and sinuses. Sinuses are the hollow spaces in your skull around the eyes and nose. Acute sinusitis often follows a cold. Acute sinusitis causes thick, discolored mucus that drains from the nose or down the back of the throat. It also can cause pain and pressure in your head and face along with a stuffy or blocked nose.  In most cases, sinusitis gets better on its own in 1 to 2 weeks. But some mild symptoms may last for several weeks. Sometimes antibiotics are needed if there is a bacterial infection.  Follow-up care is a key part of your treatment and safety. Be sure to make and go to all appointments, and call your doctor if you are having problems. It's also a good idea to know your test results and keep a list of the medicines you take.  How can you care for yourself at home?  Use saline (saltwater) nasal washes. This can help keep your nasal passages open and wash out mucus and allergens.  You can buy saline nose washes at a grocery store or drugstore. Follow the instructions on the package.  You can make your own at home. Add 1 teaspoon of non-iodized salt and 1 teaspoon of baking soda to 2 cups of distilled or boiled and cooled water. Fill a squeeze bottle or a nasal cleansing pot (such as a neti pot) with the nasal wash. Then put the tip into your nostril, and lean over the sink. With your mouth open, gently squirt the liquid. Repeat on the other side.  Try a decongestant nasal spray like oxymetazoline (Afrin). Do not use it for more than 3 days in a row. Using it for more than 3 days can make your congestion worse.  If needed, take an over-the-counter pain medicine, such as acetaminophen (Tylenol), ibuprofen (Advil, Motrin), or naproxen (Aleve). Read and follow all instructions on the label.  If the doctor prescribed antibiotics, take them as directed. Do not stop taking them just  "because you feel better. You need to take the full course of antibiotics.  Be careful when taking over-the-counter cold or flu medicines and Tylenol at the same time. Many of these medicines have acetaminophen, which is Tylenol. Read the labels to make sure that you are not taking more than the recommended dose. Too much acetaminophen (Tylenol) can be harmful.  Try a steroid nasal spray. It may help with your symptoms.  Breathe warm, moist air. You can use a steamy shower, a hot bath, or a sink filled with hot water. Avoid cold, dry air. Using a humidifier in your home may help. Follow the directions for cleaning the machine.  When should you call for help?   Call your doctor now or seek immediate medical care if:    You have new or worse swelling, redness, or pain in your face or around one or both of your eyes.     You have double vision or a change in your vision.     You have a high fever.     You have a severe headache and a stiff neck.     You have mental changes, such as feeling confused or much less alert.   Watch closely for changes in your health, and be sure to contact your doctor if:    You are not getting better as expected.   Where can you learn more?  Go to https://www.[employer].net/patiented  Enter I933 in the search box to learn more about \"Acute Sinusitis: Care Instructions.\"  Current as of: September 27, 2023               Content Version: 14.0    1291-3930 [email or web address].   Care instructions adapted under license by your healthcare professional. If you have questions about a medical condition or this instruction, always ask your healthcare professional. [email or web address] disclaims any warranty or liability for your use of this information.      Thank you for choosing us for your care. I have placed an order for a prescription so that you can start treatment. View your full visit summary for details by clicking on the link below. Your pharmacist will able to address any questions " you may have about the medication.     If you're not feeling better within 5-7 days, please schedule an appointment.  You can schedule an appointment right here in [facility], or call [phone]  If the visit is for the same symptoms as your eVisit, we'll refund the cost of your eVisit if seen within seven days.

## 2024-08-26 ENCOUNTER — APPOINTMENT (OUTPATIENT)
Dept: CT IMAGING | Facility: CLINIC | Age: 54
End: 2024-08-26
Attending: EMERGENCY MEDICINE
Payer: COMMERCIAL

## 2024-08-26 ENCOUNTER — HOSPITAL ENCOUNTER (EMERGENCY)
Facility: CLINIC | Age: 54
Discharge: HOME | End: 2024-08-26
Attending: EMERGENCY MEDICINE | Admitting: EMERGENCY MEDICINE
Payer: COMMERCIAL

## 2024-08-26 ENCOUNTER — TELEPHONE (OUTPATIENT)
Dept: UROLOGY | Facility: CLINIC | Age: 54
End: 2024-08-26

## 2024-08-26 VITALS
SYSTOLIC BLOOD PRESSURE: 164 MMHG | HEIGHT: 67 IN | WEIGHT: 205 LBS | OXYGEN SATURATION: 99 % | TEMPERATURE: 97.8 F | RESPIRATION RATE: 20 BRPM | DIASTOLIC BLOOD PRESSURE: 88 MMHG | BODY MASS INDEX: 32.18 KG/M2 | HEART RATE: 78 BPM

## 2024-08-26 DIAGNOSIS — E87.6 HYPOKALEMIA: ICD-10-CM

## 2024-08-26 DIAGNOSIS — N20.1 RIGHT URETERAL STONE: ICD-10-CM

## 2024-08-26 LAB
ALBUMIN UR-MCNC: 50 MG/DL
ANION GAP SERPL CALCULATED.3IONS-SCNC: 15 MMOL/L (ref 7–15)
APPEARANCE UR: ABNORMAL
BASOPHILS # BLD AUTO: 0 10E3/UL (ref 0–0.2)
BASOPHILS NFR BLD AUTO: 1 %
BILIRUB UR QL STRIP: NEGATIVE
BUN SERPL-MCNC: 8.7 MG/DL (ref 6–20)
CALCIUM SERPL-MCNC: 9 MG/DL (ref 8.8–10.4)
CHLORIDE SERPL-SCNC: 102 MMOL/L (ref 98–107)
COLOR UR AUTO: YELLOW
CREAT SERPL-MCNC: 0.87 MG/DL (ref 0.51–0.95)
EGFRCR SERPLBLD CKD-EPI 2021: 79 ML/MIN/1.73M2
EOSINOPHIL # BLD AUTO: 0 10E3/UL (ref 0–0.7)
EOSINOPHIL NFR BLD AUTO: 0 %
ERYTHROCYTE [DISTWIDTH] IN BLOOD BY AUTOMATED COUNT: 13 % (ref 10–15)
GLUCOSE SERPL-MCNC: 121 MG/DL (ref 70–99)
GLUCOSE UR STRIP-MCNC: NEGATIVE MG/DL
HCO3 SERPL-SCNC: 24 MMOL/L (ref 22–29)
HCT VFR BLD AUTO: 41 % (ref 35–47)
HGB BLD-MCNC: 14 G/DL (ref 11.7–15.7)
HGB UR QL STRIP: ABNORMAL
HYALINE CASTS: 3 /LPF
IMM GRANULOCYTES # BLD: 0 10E3/UL
IMM GRANULOCYTES NFR BLD: 0 %
KETONES UR STRIP-MCNC: NEGATIVE MG/DL
LEUKOCYTE ESTERASE UR QL STRIP: NEGATIVE
LYMPHOCYTES # BLD AUTO: 1.4 10E3/UL (ref 0.8–5.3)
LYMPHOCYTES NFR BLD AUTO: 20 %
MCH RBC QN AUTO: 25.8 PG (ref 26.5–33)
MCHC RBC AUTO-ENTMCNC: 34.1 G/DL (ref 31.5–36.5)
MCV RBC AUTO: 76 FL (ref 78–100)
MONOCYTES # BLD AUTO: 0.4 10E3/UL (ref 0–1.3)
MONOCYTES NFR BLD AUTO: 6 %
MUCOUS THREADS #/AREA URNS LPF: PRESENT /LPF
NEUTROPHILS # BLD AUTO: 5.1 10E3/UL (ref 1.6–8.3)
NEUTROPHILS NFR BLD AUTO: 73 %
NITRATE UR QL: NEGATIVE
NRBC # BLD AUTO: 0 10E3/UL
NRBC BLD AUTO-RTO: 0 /100
PH UR STRIP: 5.5 [PH] (ref 5–7)
PLATELET # BLD AUTO: 216 10E3/UL (ref 150–450)
POTASSIUM SERPL-SCNC: 3.2 MMOL/L (ref 3.4–5.3)
RBC # BLD AUTO: 5.42 10E6/UL (ref 3.8–5.2)
RBC URINE: >182 /HPF
SODIUM SERPL-SCNC: 141 MMOL/L (ref 135–145)
SP GR UR STRIP: 1.02 (ref 1–1.03)
SQUAMOUS EPITHELIAL: 2 /HPF
UROBILINOGEN UR STRIP-MCNC: <2 MG/DL
WBC # BLD AUTO: 7 10E3/UL (ref 4–11)
WBC URINE: 0 /HPF

## 2024-08-26 PROCEDURE — 96374 THER/PROPH/DIAG INJ IV PUSH: CPT

## 2024-08-26 PROCEDURE — 250N000011 HC RX IP 250 OP 636: Performed by: EMERGENCY MEDICINE

## 2024-08-26 PROCEDURE — 81001 URINALYSIS AUTO W/SCOPE: CPT | Performed by: EMERGENCY MEDICINE

## 2024-08-26 PROCEDURE — 250N000013 HC RX MED GY IP 250 OP 250 PS 637: Performed by: EMERGENCY MEDICINE

## 2024-08-26 PROCEDURE — 96361 HYDRATE IV INFUSION ADD-ON: CPT

## 2024-08-26 PROCEDURE — 80048 BASIC METABOLIC PNL TOTAL CA: CPT | Performed by: EMERGENCY MEDICINE

## 2024-08-26 PROCEDURE — 96375 TX/PRO/DX INJ NEW DRUG ADDON: CPT

## 2024-08-26 PROCEDURE — 99285 EMERGENCY DEPT VISIT HI MDM: CPT | Mod: 25

## 2024-08-26 PROCEDURE — 74176 CT ABD & PELVIS W/O CONTRAST: CPT

## 2024-08-26 PROCEDURE — 258N000003 HC RX IP 258 OP 636: Performed by: EMERGENCY MEDICINE

## 2024-08-26 PROCEDURE — 96376 TX/PRO/DX INJ SAME DRUG ADON: CPT

## 2024-08-26 PROCEDURE — 85025 COMPLETE CBC W/AUTO DIFF WBC: CPT | Performed by: EMERGENCY MEDICINE

## 2024-08-26 PROCEDURE — 36415 COLL VENOUS BLD VENIPUNCTURE: CPT | Performed by: EMERGENCY MEDICINE

## 2024-08-26 RX ORDER — ONDANSETRON 2 MG/ML
4 INJECTION INTRAMUSCULAR; INTRAVENOUS ONCE
Status: COMPLETED | OUTPATIENT
Start: 2024-08-26 | End: 2024-08-26

## 2024-08-26 RX ORDER — POTASSIUM CHLORIDE 1500 MG/1
40 TABLET, EXTENDED RELEASE ORAL ONCE
Status: COMPLETED | OUTPATIENT
Start: 2024-08-26 | End: 2024-08-26

## 2024-08-26 RX ORDER — OXYCODONE HYDROCHLORIDE 5 MG/1
5 TABLET ORAL ONCE
Status: COMPLETED | OUTPATIENT
Start: 2024-08-26 | End: 2024-08-26

## 2024-08-26 RX ORDER — KETOROLAC TROMETHAMINE 15 MG/ML
15 INJECTION, SOLUTION INTRAMUSCULAR; INTRAVENOUS ONCE
Status: COMPLETED | OUTPATIENT
Start: 2024-08-26 | End: 2024-08-26

## 2024-08-26 RX ORDER — OXYCODONE HYDROCHLORIDE 5 MG/1
5 TABLET ORAL EVERY 6 HOURS PRN
Qty: 9 TABLET | Refills: 0 | Status: SHIPPED | OUTPATIENT
Start: 2024-08-26 | End: 2024-08-29

## 2024-08-26 RX ORDER — TAMSULOSIN HYDROCHLORIDE 0.4 MG/1
0.4 CAPSULE ORAL DAILY
Qty: 5 CAPSULE | Refills: 0 | Status: SHIPPED | OUTPATIENT
Start: 2024-08-26 | End: 2024-08-31

## 2024-08-26 RX ORDER — ONDANSETRON 4 MG/1
4 TABLET, ORALLY DISINTEGRATING ORAL EVERY 6 HOURS PRN
Qty: 30 TABLET | Refills: 0 | Status: SHIPPED | OUTPATIENT
Start: 2024-08-26

## 2024-08-26 RX ADMIN — POTASSIUM CHLORIDE 40 MEQ: 1500 TABLET, EXTENDED RELEASE ORAL at 09:12

## 2024-08-26 RX ADMIN — KETOROLAC TROMETHAMINE 15 MG: 15 INJECTION, SOLUTION INTRAMUSCULAR; INTRAVENOUS at 09:13

## 2024-08-26 RX ADMIN — ONDANSETRON 4 MG: 2 INJECTION INTRAMUSCULAR; INTRAVENOUS at 07:47

## 2024-08-26 RX ADMIN — OXYCODONE HYDROCHLORIDE 5 MG: 5 TABLET ORAL at 09:12

## 2024-08-26 RX ADMIN — KETOROLAC TROMETHAMINE 15 MG: 15 INJECTION, SOLUTION INTRAMUSCULAR; INTRAVENOUS at 07:48

## 2024-08-26 RX ADMIN — SODIUM CHLORIDE 500 ML: 9 INJECTION, SOLUTION INTRAVENOUS at 07:46

## 2024-08-26 ASSESSMENT — ACTIVITIES OF DAILY LIVING (ADL)
ADLS_ACUITY_SCORE: 35
ADLS_ACUITY_SCORE: 35

## 2024-08-26 ASSESSMENT — COLUMBIA-SUICIDE SEVERITY RATING SCALE - C-SSRS
6. HAVE YOU EVER DONE ANYTHING, STARTED TO DO ANYTHING, OR PREPARED TO DO ANYTHING TO END YOUR LIFE?: NO
1. IN THE PAST MONTH, HAVE YOU WISHED YOU WERE DEAD OR WISHED YOU COULD GO TO SLEEP AND NOT WAKE UP?: NO
2. HAVE YOU ACTUALLY HAD ANY THOUGHTS OF KILLING YOURSELF IN THE PAST MONTH?: NO

## 2024-08-26 NOTE — ED NOTES
Discussed discharge with pt. Pt will take meds as prescribed and follow up with Urology as needed.

## 2024-08-26 NOTE — DISCHARGE INSTRUCTIONS
Take ibuprofen 600-800mg up to 3 times a day for pain.  Take oxycodone for breakthrough pain. Take flomax daily to help with passage of stone.  Take zofran for nausea. Follow up with [facility] - referral placed.

## 2024-08-26 NOTE — ED PROVIDER NOTES
Emergency Department Encounter     Evaluation Date & Time:   No admission date for patient encounter.    CHIEF COMPLAINT:  Flank Pain      Triage Note:       ED COURSE & MEDICAL DECISION MAKING:     Pt presenting with acute onset right side pain around 0430 that woke her up.  Pt reports she felt like she might have to have a BM, but pain not resolving. Reports nausea, but no vomiting. Did states some burning with urinationg, but no blood seen and was not having pain/discomfort prior to this. Hx of ureteral stones 4 years ago, feels similar. Pt initially pacing in room, very indicative of ureteral stone/obstruction.  She has an otherwise entirely benign abdomen, intact pulses.  Do not have a suspicion for aortic pathology or appendicitis. Will get labs including UA, CT abd/pelvis, treat symptomatically and reassess.    ED Course as of 08/26/24 0951   Mon Aug 26, 2024   0737 I met with the patient to obtain patient history and performed a physical exam. Discussed plan for ED work up including potential diagnostic studies and interventions.    0804 CBC overall reassuring.   0835 K 3.2, will replace. Renal function intact.   0835 CT showing 7mm distal right ureteral stone, consistent with history.   0839 Reassessed and updated patient with findings.  Improved, but still having some discomfort. Will give a dose of PO oxycodone, another Toradol 15mg IV and reassess. Anticipate eventual discharge, Rx for oxycodone, zofran, flomax, outpatient [facility] referral/follow up.   0844 UA neg for infection.   0920 Reevaluated and updated the patient with findings. We discussed the plan for discharge and the patient is agreeable. Reviewed supportive cares, symptomatic treatment, outpatient follow up, and reasons to return to the Emergency Department. Patient to be discharged by ED RN.     0949 Symptoms managed here, tolerating PO, appropriate for outpatient [facility] follow up. Pt understands follow up, meds, return precautions.          Medical Decision Making    History:  Supplemental history from: Documented in chart  External Record(s) reviewed: Documented in chart and Outpatient Record: 10/15/23 [facility]    Work Up:  Chart documentation includes differential considered and any EKGs or imaging independently interpreted by provider, where specified.  In additional to work up documented, I considered the following work up: Documented in chart, if applicable.    External consultation:  Discussion of management with another provider: Documented in chart, if applicable    Complicating factors:  Care impacted by chronic illness: Hypertension  Care affected by social determinants of health: N/A    Disposition considerations: Discharge. I prescribed additional prescription strength medication(s) as charted. I considered admission, but ultimately discharged patient after reassuring workup, improvement with outpatient follow up plan.    No MIPS measures identified.     At the conclusion of the encounter I discussed the results of all the tests and the disposition. The questions were answered. The patient or family acknowledged understanding and was agreeable with the care plan.      MEDICATIONS GIVEN IN THE EMERGENCY DEPARTMENT:  Medications   ketorolac (TORADOL) injection 15 mg (15 mg Intravenous $Given 8/26/24 0748)   ondansetron (ZOFRAN) injection 4 mg (4 mg Intravenous $Given 8/26/24 0747)   sodium chloride 0.9% BOLUS 500 mL (0 mLs Intravenous Stopped 8/26/24 0950)   potassium chloride saad ER (KLOR-CON M20) CR tablet 40 mEq (40 mEq Oral $Given 8/26/24 0912)   oxyCODONE (ROXICODONE) tablet 5 mg (5 mg Oral $Given 8/26/24 0912)   ketorolac (TORADOL) injection 15 mg (15 mg Intravenous $Given 8/26/24 0913)       NEW PRESCRIPTIONS STARTED AT TODAY'S ED VISIT:  New Prescriptions    ONDANSETRON (ZOFRAN ODT) 4 MG ODT TAB    Take 1 tablet (4 mg) by mouth every 6 hours as needed for vomiting or nausea.    OXYCODONE  (ROXICODONE) 5 MG TABLET    Take 1 tablet (5 mg) by mouth every 6 hours as needed for breakthrough pain or pain.    TAMSULOSIN (FLOMAX) 0.4 MG CAPSULE    Take 1 capsule (0.4 mg) by mouth daily for 5 doses.       HPI   HPI     Lawanda Katz is a 54 year old female with a pertinent history of hypertension and kidney stones who presents to this ED by walk-in for evaluation of flank pain.     Patient presents to the ED for concerns of right flank pain that woke her up this morning at 4-4:30 AM. She states that she also has some lower abdominal pain, nausea, and spine pain. The pain is at a 7/10 currently. Walking, sitting, and laying down make the pain worse. She states that her pain is similar to the pain she had with her last kidney stone. Her last stone was x4 years ago. She has not taken any medications for her pain. She is experiencing some burning when urinating. She states that she was fine when she went to bed yesterday night (8/25/24).     Patient denies any diarrhea. Patient states no other complaints or concerns at this time.     Per Chart Review:   10/15/23, [facility]: Patient presents with Lower abdominal pain and upper back pain that started yesterday. Short of breath with exertion since she got covid 1 month ago. Multiple etiologies and diagnoses were considered to include but not limited to urinary tract infection, dysuria, hyperglycemia.  Urinalysis was negative for infection.  patient is treated with watchful waiting and symptomatic care with expected course of resolution and indication for return was gone over. Patient was discharged.     REVIEW OF SYSTEMS:  See HPI      Medical History     Past Medical History:   Diagnosis Date    Anxiety     Depression     Hypertension        Past Surgical History:   Procedure Laterality Date    ADENOIDECTOMY      CYSTOSCOPY  2007    HYSTERECTOMY, PAP NO LONGER INDICATED  2010    BSO, cervix removed, for dysmenorrhea    TUBAL  "LIGATION  1999    US BREAST BIOPSY LT  1/2013    left, benign       Family History   Problem Relation Age of Onset    Heart Disease Mother     Unknown/Adopted No family hx of        Social History     Tobacco Use    Smoking status: Never     Passive exposure: Past    Smokeless tobacco: Never   Vaping Use    Vaping status: Never Used   Substance Use Topics    Alcohol use: No     Alcohol/week: 0.0 standard drinks of alcohol    Drug use: No       ondansetron (ZOFRAN ODT) 4 MG ODT tab  oxyCODONE (ROXICODONE) 5 MG tablet  tamsulosin (FLOMAX) 0.4 MG capsule  amLODIPine-benazepril (LOTREL) 10-40 MG capsule  amoxicillin-clavulanate (AUGMENTIN) 875-125 MG tablet  cyclobenzaprine (FLEXERIL) 10 MG tablet  hydrocortisone, Perianal, (HYDROCORTISONE) 2.5 % cream  hydrOXYzine (ATARAX) 25 MG tablet  LORazepam (ATIVAN) 0.5 MG tablet  mupirocin (BACTROBAN) 2 % external ointment  valACYclovir (VALTREX) 1000 mg tablet        Physical Exam     Vitals:  BP (!) 178/90   Pulse 76   Temp 97.8  F (36.6  C)   Resp 20   Ht 1.702 m (5' 7\")   Wt 93 kg (205 lb)   LMP  (LMP Unknown)   SpO2 100%   BMI 32.11 kg/m      PHYSICAL EXAM:   Physical Exam  Vitals and nursing note reviewed.   Constitutional:       Comments: Initially pacing, appears uncomfortable   HENT:      Head: Normocephalic and atraumatic.      Nose: Nose normal.      Mouth/Throat:      Mouth: Mucous membranes are moist.   Eyes:      Pupils: Pupils are equal, round, and reactive to light.   Cardiovascular:      Rate and Rhythm: Normal rate and regular rhythm.      Pulses: Normal pulses.           Radial pulses are 2+ on the right side and 2+ on the left side.        Dorsalis pedis pulses are 2+ on the right side and 2+ on the left side.   Pulmonary:      Effort: Pulmonary effort is normal. No respiratory distress.      Breath sounds: Normal breath sounds.   Abdominal:      Palpations: Abdomen is soft.      Tenderness: There is no abdominal tenderness. There is no right CVA " tenderness. Negative signs include McBurney's sign.   Musculoskeletal:      Cervical back: Full passive range of motion without pain and neck supple.      Comments: No calf tenderness or swelling b/l   Skin:     General: Skin is warm.      Findings: No rash.   Neurological:      General: No focal deficit present.      Mental Status: She is alert. Mental status is at baseline.      Comments: Fluent speech, no acute lateralizing deficits   Psychiatric:         Mood and Affect: Mood normal.         Behavior: Behavior normal.         Results     LAB:  All pertinent labs reviewed and interpreted  Labs Ordered and Resulted from Time of ED Arrival to Time of ED Departure   BASIC METABOLIC PANEL - Abnormal       Result Value    Sodium 141      Potassium 3.2 (*)     Chloride 102      Carbon Dioxide (CO2) 24      Anion Gap 15      Urea Nitrogen 8.7      Creatinine 0.87      GFR Estimate 79      Calcium 9.0      Glucose 121 (*)    ROUTINE UA WITH MICROSCOPIC REFLEX TO CULTURE - Abnormal    Color Urine Yellow      Appearance Urine Turbid (*)     Glucose Urine Negative      Bilirubin Urine Negative      Ketones Urine Negative      Specific Gravity Urine 1.020      Blood Urine >1.0 mg/dL (*)     pH Urine 5.5      Protein Albumin Urine 50 (*)     Urobilinogen Urine <2.0      Nitrite Urine Negative      Leukocyte Esterase Urine Negative      Mucus Urine Present (*)     RBC Urine >182 (*)     WBC Urine 0      Squamous Epithelials Urine 2 (*)     Hyaline Casts Urine 3 (*)    CBC WITH PLATELETS AND DIFFERENTIAL - Abnormal    WBC Count 7.0      RBC Count 5.42 (*)     Hemoglobin 14.0      Hematocrit 41.0      MCV 76 (*)     MCH 25.8 (*)     MCHC 34.1      RDW 13.0      Platelet Count 216      % Neutrophils 73      % Lymphocytes 20      % Monocytes 6      % Eosinophils 0      % Basophils 1      % Immature Granulocytes 0      NRBCs per 100 WBC 0      Absolute Neutrophils 5.1      Absolute Lymphocytes 1.4      Absolute Monocytes 0.4       Absolute Eosinophils 0.0      Absolute Basophils 0.0      Absolute Immature Granulocytes 0.0      Absolute NRBCs 0.0         RADIOLOGY:  Abd/pelvis CT no contrast - Stone Protocol   Final Result   IMPRESSION:    1.  Obstructing 7 mm distal right ureteral stone with moderate to severe right-sided hydronephrosis.   2.  Bilateral punctate nonobstructing renal calculi.   3.  Hepatic steatosis.                      ECG:  none    PROCEDURES:  Procedures:  none      FINAL IMPRESSION:    ICD-10-CM    1. Right ureteral stone  N20.1 Adult Urology  Referral      2. Hypokalemia  E87.6           0 minutes of critical care time      I, Farhan Reina, am serving as a scribe to document services personally performed by Dr. Rafat Moya, based on my observations and the provider's statements to me. I, Rafat Moya, DO attest that Farhan Reina is acting in a scribe capacity, has observed my performance of the services and has documented them in accordance with my direction.      Rafat Moya DO  Emergency Medicine  [facility] EMERGENCY ROOM  8/26/2024  7:28 AM          Rafat Moya MD  08/26/24 0951

## 2024-08-26 NOTE — Clinical Note
Lawanda Katz was seen and treated in our emergency department on 8/26/2024.  She may return to work on 08/28/2024.       If you have any questions or concerns, please don't hesitate to call.      Rafat Moya MD

## 2024-08-26 NOTE — TELEPHONE ENCOUNTER
[facility] Call Center    Phone Message    May a detailed message be left on voicemail: yes     Reason for Call: Appointment Intake    Referring Provider Name: see referral  Diagnosis and/or Symptoms: urgent 1-3 day referral for kidney stones    Pt called again and would like a rush on her referral.     Action Taken: Message routed to:  [facility] ([facility]): Shane JANE    Travel Screening: Not Applicable     Date of Service:

## 2024-08-26 NOTE — ED TRIAGE NOTES
Woke with pain around 4-4:30.  Has had kidney stone in the past.  No blood in urine. Nauseated.  Dry mouth  Was dropped off at the department     Triage Assessment (Adult)       Row Name 08/26/24 0729          Triage Assessment    Airway WDL WDL        Respiratory WDL    Respiratory WDL WDL        Skin Circulation/Temperature WDL    Skin Circulation/Temperature WDL WDL        Cardiac WDL    Cardiac WDL WDL        Peripheral/Neurovascular WDL    Peripheral Neurovascular WDL WDL        Cognitive/Neuro/Behavioral WDL    Cognitive/Neuro/Behavioral WDL WDL

## 2024-08-27 ENCOUNTER — VIRTUAL VISIT (OUTPATIENT)
Dept: UROLOGY | Facility: CLINIC | Age: 54
End: 2024-08-27
Payer: COMMERCIAL

## 2024-08-27 DIAGNOSIS — N20.0 NEPHROLITHIASIS: ICD-10-CM

## 2024-08-27 DIAGNOSIS — N20.1 RIGHT URETERAL STONE: Primary | ICD-10-CM

## 2024-08-27 DIAGNOSIS — N20.0 CALCIUM OXALATE KIDNEY STONES: ICD-10-CM

## 2024-08-27 PROCEDURE — 99204 OFFICE O/P NEW MOD 45 MIN: CPT | Mod: 95 | Performed by: NURSE PRACTITIONER

## 2024-08-27 RX ORDER — DIMENHYDRINATE 50 MG
50 TABLET ORAL
Qty: 30 TABLET | Refills: 0 | Status: SHIPPED | OUTPATIENT
Start: 2024-08-27

## 2024-08-27 RX ORDER — OXYCODONE HYDROCHLORIDE 5 MG/1
5 TABLET ORAL EVERY 6 HOURS PRN
Qty: 12 TABLET | Refills: 0 | Status: SHIPPED | OUTPATIENT
Start: 2024-08-30 | End: 2024-09-02

## 2024-08-27 RX ORDER — TAMSULOSIN HYDROCHLORIDE 0.4 MG/1
0.4 CAPSULE ORAL DAILY
Qty: 30 CAPSULE | Refills: 0 | Status: SHIPPED | OUTPATIENT
Start: 2024-08-27 | End: 2024-09-20

## 2024-08-27 NOTE — NURSING NOTE
Current patient location: [address] [place] [address]    Is the patient currently in the state of [place]? YES    Visit mode:VIDEO    If the visit is dropped, the patient can be reconnected by: VIDEO VISIT: Text to cell phone:   Telephone Information:   Mobile [phone]       Will anyone else be joining the visit? NO  (If patient encounters technical issues they should call [phone])    How would you like to obtain your AVS? MyChart    Are changes needed to the allergy or medication list? No, Pt stated no changes to allergies, and Pt stated no med changes    Are refills needed on medications prescribed by this physician? NO    Rooming Documentation:  Not applicable      Reason for visit: Consult    Paula CISNEROS

## 2024-08-27 NOTE — TELEPHONE ENCOUNTER
Message left for patient that we have fit her in at 4p today for a virtual visit.  Patient to call back or message that she received the message.  [employer] message also sent.  Sarah Simpson RN

## 2024-08-27 NOTE — PATIENT INSTRUCTIONS
UROLOGY CLINIC VISIT PATIENT INSTRUCTIONS    -Please refer to the following link for information to learn about the ureteroscopy procedure:    [email or web address]    -If having severe flank pain, fevers, chills, nausea, or vomiting please notify Urology clinic or be seen in the ER.     If you have any issues, questions or concerns in the meantime, do not hesitate to contact us at [facility] at [phone] or via [employer].     Lizy Lynch CNP  Department of Urology      Medicines to Control Your Kidney Stone Symptoms    Control Pain: First Line Treatment    Dramamine (Please use the drowsy version, nongeneric formulation)  Available over the counter  **This medicine will cause increased drowsiness. DON T DRIVE OR OPERATE MACHINERY FOR 6 HOURS**    How to take:   Take 50 mg at bedtime every night until the stone passes  In addition, take 50 mg every 6 hours as needed    What it does:  Decreases spasm of the ureter  Decreases recurrence of pain for next 24 hours  Decreases severe pain  Decreases nausea  Will help you sleep    Ibuprofen (Advil or Motrin)  Available over the counter  **Please do not take if advise to avoid NSAIDS, history of stomach ulcers/bleeding issues, blood thinners, or already on NSAIDS**    How to take:   Take 2 to 4 (200 mg) tablets every 6 hours for the first 48 hours. After that, use only as needed    What it does:  Decreases pain  Prevents spasm of the ureter    Acetaminophen (Tylenol)   Available over the counter    How to Take:  Take 2 (500 mg) tablets every 6 hours as needed. Do not exceed 8 tablets (4,000 mg total) in 24 hours    What it does:  Highly effective in controlling pain      Control pain: second line treatment (if you still have severe pain 1 hour after trying all of the above)    Narcotics (oxycodone)     How to take   Take 1 to 2 tablets every 6 hours as needed    Narcotics have major side effects:   Confusion, disorientation and  sleepiness. DO NOT DRIVE OR OPERATE MACHINERY WITHIN 24 HOURS.   Nausea. Take Dramamine, Zofran or Haldol to help control this.   May cause constipation (hard, dry stools). Start over-the-counter Miralax (1/2 to 1 capful) as needed if experiencing constipation.   Trouble sleeping    Nausea:    Ondansetron (Zofran)    Take 4 mg every 6 hours as needed   Use only for severe nausea      Other medicines we may give you:    Tamsulosin (Flomax): Take 0.4 mg daily with food     What it does:   May decrease stone pain   May help stones pass faster   May make surgery more successful by improving access to stone   May decrease discomfort from ureteral stent, if used    Possible side effects:   Lightheadedness when standing too quickly (especially in older people)   Stuffy nose

## 2024-08-27 NOTE — PROGRESS NOTES
Urology Video Office Visit    Video-Visit Details    Type of service:  Video Visit    Video Start Time: 1543    Video End Time: 1614    Originating Location (pt. Location): Home    Distant Location (provider location):  Off-site     Platform used for Video Visit: [employer]           Assessment and Plan:     Assessment:54 year old female with a right 5mm distal ureteral stone.    Plan:  -Reviewed CT scan with patient. Noted right 5mm distal ureteral stone.   -Discussed future visit for stone prevention measurements.   -We discussed treatment options including observation with MET x 4 weeks vs. ureteroscopy and laser lithotripsy. I counseled the patient regarding the potential need for a ureteral stent after treatment and the necessity of removing the stent after surgery. I also discussed the possibility of additional procedures to render the patient stone free.  She will continue with MET at this time. Will notify Urology on 09/03/24 about decision to either proceed with definitive stone procedure or continue with MET.   -Please use acetaminophen, ibuprofen, dimenhydrinate, and oxycodone PRN for pain control. Please continue on tamsulosin 0.4mg PO daily to help with stone passage.   -If having severe flank pain, fevers, chills, nausea, or vomiting please notify Urology clinic or be seen in the ER.     Lizy Lynch CNP  Department of Urology  August 27, 2024    I spent a total of 35 minutes spent on the date of the encounter doing chart review, history and exam, documentation, and further activities as noted above.          Chief Complaint:   Right Distal Stone         History of Present Illness:    Lawanda Katz is a pleasant 54 year old female who presents with concerns of a right distal ureteral    Ms. Sterlign was seen in the ER on 8/26/24 for concerns of right flank pain.     CT scan on 08/26/24 (images personally reviewed) revealed a right 5mm distal ureteral stone with hydronephrosis. Noted two  nonobstructing punctate stones. Noted one nonobstructing left punctate stone. No noted left hydronephrosis.     She continues to have some discomfort but denies any flank pain. Denies any f/c/n/v, gross hematuria, or dysuria at this time.     This is her fourth stone episode.     Last stone episode was in Jan 2021 which did require a definitive stone procedure.      History of CaOx stones.     First stone was about 10-12 years ago.     No known family history of nephrolithiasis.            Past Medical History:     Past Medical History:   Diagnosis Date    Anxiety     Depression     Hypertension             Past Surgical History:     Past Surgical History:   Procedure Laterality Date    ADENOIDECTOMY      CYSTOSCOPY  2007    HYSTERECTOMY, PAP NO LONGER INDICATED  2010    BSO, cervix removed, for dysmenorrhea    TUBAL LIGATION  1999     BREAST BIOPSY LT  1/2013    left, benign            Medications     Current Outpatient Medications   Medication Sig Dispense Refill    amLODIPine-benazepril (LOTREL) 10-40 MG capsule Take 1 capsule by mouth daily 90 capsule 1    cyclobenzaprine (FLEXERIL) 10 MG tablet Take 1 tablet (10 mg) by mouth daily as needed for muscle spasms 30 tablet 0    hydrocortisone, Perianal, (HYDROCORTISONE) 2.5 % cream Place rectally 2 times daily as needed for hemorrhoids 30 g 1    hydrOXYzine (ATARAX) 25 MG tablet Take 1-2 tablets (25-50 mg) by mouth every 6 hours as needed for anxiety 60 tablet 1    LORazepam (ATIVAN) 0.5 MG tablet Take 1 or 2 tablets an hour before dental procedure (you will need to get a ride home). Can also take 1-2 tablets to relieve anxiety while on airplane.  May be used twice in 1 day. 2 tablet 0    mupirocin (BACTROBAN) 2 % external ointment Apply topically 3 times daily 15 g 0    ondansetron (ZOFRAN ODT) 4 MG ODT tab Take 1 tablet (4 mg) by mouth every 6 hours as needed for vomiting or nausea. 30 tablet 0    oxyCODONE (ROXICODONE) 5 MG tablet Take 1 tablet (5 mg) by mouth  every 6 hours as needed for breakthrough pain or pain. 9 tablet 0    tamsulosin (FLOMAX) 0.4 MG capsule Take 1 capsule (0.4 mg) by mouth daily for 5 doses. 5 capsule 0    valACYclovir (VALTREX) 1000 mg tablet Take 1 tablet (1,000 mg) by mouth daily 90 tablet 0     No current facility-administered medications for this visit.            Family History:     Family History   Problem Relation Age of Onset    Heart Disease Mother     Unknown/Adopted No family hx of             Social History:     Social History     Socioeconomic History    Marital status: Single     Spouse name: Not on file    Number of children: Not on file    Years of education: Not on file    Highest education level: Not on file   Occupational History    Not on file   Tobacco Use    Smoking status: Never     Passive exposure: Past    Smokeless tobacco: Never   Vaping Use    Vaping status: Never Used   Substance and Sexual Activity    Alcohol use: No     Alcohol/week: 0.0 standard drinks of alcohol    Drug use: No    Sexual activity: Yes     Partners: Male   Other Topics Concern    Parent/sibling w/ CABG, MI or angioplasty before 65F 55M? Not Asked   Social History Narrative    Not on file     Social Determinants of Health     Financial Resource Strain: Low Risk  (11/10/2023)    Financial Resource Strain     Within the past 12 months, have you or your family members you live with been unable to get utilities (heat, electricity) when it was really needed?: No   Food Insecurity: Low Risk  (11/10/2023)    Food Insecurity     Within the past 12 months, did you worry that your food would run out before you got money to buy more?: No     Within the past 12 months, did the food you bought just not last and you didn t have money to get more?: No   Transportation Needs: Low Risk  (11/10/2023)    Transportation Needs     Within the past 12 months, has lack of transportation kept you from medical appointments, getting your medicines, non-medical meetings or  appointments, work, or from getting things that you need?: No   Physical Activity: Not on file   Stress: Not on file   Social Connections: Unknown (1/1/2022)    Received from [facility], [facility]    Social Connections     Frequency of Communication with Friends and Family: Not on file   Interpersonal Safety: Low Risk  (7/1/2024)    Interpersonal Safety     Do you feel physically and emotionally safe where you currently live?: Yes     Within the past 12 months, have you been hit, slapped, kicked or otherwise physically hurt by someone?: No     Within the past 12 months, have you been humiliated or emotionally abused in other ways by your partner or ex-partner?: No   Housing Stability: Low Risk  (11/10/2023)    Housing Stability     Do you have housing? : Yes     Are you worried about losing your housing?: No            Allergies:   Hydrochlorothiazide, Metronidazole, Wellbutrin [bupropion], and Morphine         Review of Systems:  From intake questionnaire   Negative 14 system review except as noted on HPI, nurse's note.         Physical Exam:   General Appearance: Well groomed, hygenic  Eyes: No redness, discharge  Respiratory: No cough, no respiratory distress or labored breathing  Musculoskeletal: Grossly normal, full range of motion in upper extremities, no gross deficits  Skin: No discoloration or apparent rashes  Neurologic - No tremors  Psychiatric - Alert and oriented  The rest of a comprehensive physical examination is deferred due to video visit restrictions        Labs:    I personally reviewed all applicable laboratory data and went over findings with patient  Significant for:    CBC RESULTS:  Recent Labs   Lab Test 08/26/24  0747 11/10/23  0843 08/08/22  0738 04/05/17  1146   WBC 7.0 4.2 3.7* 5.9   HGB 14.0 13.8 13.5 13.8    179 160 216        BMP RESULTS:  Recent Labs   Lab Test 08/26/24  0747 11/10/23  0843 05/17/23  1151  08/08/22  0738 04/05/17  1146 10/05/16  1127    143 143 141 141 139   POTASSIUM 3.2* 4.6 4.9 4.7 3.8 4.2   CHLORIDE 102 105 105 104 105 106   CO2 24 27 28 27 29 25   ANIONGAP 15 11 10 10 7 8   * 93 96 98 90 92   BUN 8.7 10.5 9.0 11.5 8 6*   CR 0.87 0.88 0.82 0.88 0.64 0.71   GFRESTIMATED 79 78 85 79 >90  Non  GFR Calc   89   GFRESTBLACK  --   --   --   --  >90   GFR Calc   >90   GFR Calc     SHALOM 9.0 9.8 10.0 9.5 9.0 8.6       UA RESULTS:   Recent Labs   Lab Test 08/26/24  0811 05/09/24  1026 10/15/23  1514   SG 1.020 >=1.030 >=1.030   URINEPH 5.5 5.5 5.5   NITRITE Negative Negative Negative   RBCU >182*  --  None Seen   WBCU 0  --  None Seen       CALCIUM RESULTS  Lab Results   Component Value Date    SHALOM 9.0 08/26/2024    SHALOM 9.8 11/10/2023    SHALOM 10.0 05/17/2023    SHALOM 9.0 04/05/2017    SHALOM 8.6 10/05/2016    SHALOM 9.1 03/23/2016           Imaging:    I personally reviewed all applicable imaging and went over the below findings with patient.    Results for orders placed or performed during the hospital encounter of 08/26/24   Abd/pelvis CT no contrast - Stone Protocol    Narrative    EXAM: CT ABDOMEN PELVIS W/O CONTRAST  LOCATION: [facility]  DATE: 8/26/2024    INDICATION: right side pain acutely this morning.  Hx of ureteral stones  COMPARISON: CT 8/2/2021  TECHNIQUE: CT scan of the abdomen and pelvis was performed without IV contrast. Multiplanar reformats were obtained. Dose reduction techniques were used.  CONTRAST: None.    FINDINGS:   LOWER CHEST: Normal.    HEPATOBILIARY: Mild hepatic steatosis with fatty sparing around the gallbladder. Normal gallbladder and bile ducts.    PANCREAS: Normal.    SPLEEN: Normal.    ADRENAL GLANDS: Normal.    KIDNEYS/BLADDER: Obstructing 4 x 3 x 7 mm distal right ureteral stone located near the crossing of the iliac vessels. It is approximately 6 cm from the right ureterovesical junction.  Associated moderate to severe right-sided hydronephrosis and mild to   moderate perinephric edema. Bilateral punctate nonobstructing renal calculi measuring in the range of 1-2 mm. Collapsed bladder.    BOWEL: Questionable tiny hiatal hernia. Small fat-containing umbilical hernia. Normal caliber small bowel and colon. Nonvisualized appendix. No free air or free fluid.    LYMPH NODES: Normal.    VASCULATURE: Mild aortoiliac atherosclerosis.    PELVIC ORGANS: Hysterectomy.    MUSCULOSKELETAL: Mild spinal degenerative changes.      Impression    IMPRESSION:   1.  Obstructing 7 mm distal right ureteral stone with moderate to severe right-sided hydronephrosis.  2.  Bilateral punctate nonobstructing renal calculi.  3.  Hepatic steatosis.

## 2024-09-04 ENCOUNTER — TELEPHONE (OUTPATIENT)
Dept: UROLOGY | Facility: CLINIC | Age: 54
End: 2024-09-04
Payer: COMMERCIAL

## 2024-09-04 DIAGNOSIS — N20.1 CALCULUS OF DISTAL RIGHT URETER: Primary | ICD-10-CM

## 2024-09-05 ENCOUNTER — TELEPHONE (OUTPATIENT)
Dept: UROLOGY | Facility: CLINIC | Age: 54
End: 2024-09-05
Payer: COMMERCIAL

## 2024-09-05 PROBLEM — N20.1 CALCULUS OF DISTAL RIGHT URETER: Status: ACTIVE | Noted: 2024-09-04

## 2024-09-05 NOTE — TELEPHONE ENCOUNTER
Spoke with: Patient       Date of surgery: Friday Sept 20 2024 with Dr Dudley      Location: [facility]       Informed patient they will need a adult : YES      Pre op with provider: Patient scheduled 9-18-24 [facility]      H&P Scheduled in PAC- NA         Pre procedure covid :Not req      Additional imaging: NA        Surgery Packet : Sent via [facility]      Additional comments: Please call for surgery teaching

## 2024-09-10 ENCOUNTER — LAB (OUTPATIENT)
Dept: LAB | Facility: CLINIC | Age: 54
End: 2024-09-10
Payer: COMMERCIAL

## 2024-09-10 DIAGNOSIS — R30.0 DYSURIA: Primary | ICD-10-CM

## 2024-09-10 DIAGNOSIS — R30.0 DYSURIA: ICD-10-CM

## 2024-09-10 LAB
ALBUMIN UR-MCNC: NEGATIVE MG/DL
APPEARANCE UR: CLEAR
BILIRUB UR QL STRIP: NEGATIVE
COLOR UR AUTO: YELLOW
GLUCOSE UR STRIP-MCNC: NEGATIVE MG/DL
HGB UR QL STRIP: NEGATIVE
KETONES UR STRIP-MCNC: NEGATIVE MG/DL
LEUKOCYTE ESTERASE UR QL STRIP: NEGATIVE
NITRATE UR QL: NEGATIVE
PH UR STRIP: 5.5 [PH] (ref 5–8)
SP GR UR STRIP: 1.02 (ref 1–1.03)
UROBILINOGEN UR STRIP-ACNC: 0.2 E.U./DL

## 2024-09-10 PROCEDURE — 81003 URINALYSIS AUTO W/O SCOPE: CPT

## 2024-09-10 NOTE — PROGRESS NOTES
Patient has kidney stones. Patient reports is having increased frequency and back pain. Has had some burning with urination as well although this has improved. Did feel flushed this am but did not take temperature.

## 2024-09-13 ENCOUNTER — ANCILLARY PROCEDURE (OUTPATIENT)
Dept: GENERAL RADIOLOGY | Facility: CLINIC | Age: 54
End: 2024-09-13
Attending: NURSE PRACTITIONER
Payer: COMMERCIAL

## 2024-09-13 DIAGNOSIS — N20.1 RIGHT URETERAL STONE: ICD-10-CM

## 2024-09-13 PROCEDURE — 74018 RADEX ABDOMEN 1 VIEW: CPT | Mod: TC | Performed by: RADIOLOGY

## 2024-09-16 DIAGNOSIS — N20.1 RIGHT URETERAL STONE: Primary | ICD-10-CM

## 2024-09-17 ENCOUNTER — HOSPITAL ENCOUNTER (OUTPATIENT)
Dept: CT IMAGING | Facility: CLINIC | Age: 54
Discharge: HOME | End: 2024-09-17
Attending: NURSE PRACTITIONER | Admitting: NURSE PRACTITIONER
Payer: COMMERCIAL

## 2024-09-17 DIAGNOSIS — N20.1 RIGHT URETERAL STONE: ICD-10-CM

## 2024-09-17 PROCEDURE — 74176 CT ABD & PELVIS W/O CONTRAST: CPT

## 2024-09-18 ENCOUNTER — OFFICE VISIT (OUTPATIENT)
Dept: INTERNAL MEDICINE | Facility: CLINIC | Age: 54
End: 2024-09-18
Payer: COMMERCIAL

## 2024-09-18 ENCOUNTER — TELEPHONE (OUTPATIENT)
Dept: UROLOGY | Facility: CLINIC | Age: 54
End: 2024-09-18

## 2024-09-18 VITALS
WEIGHT: 202 LBS | TEMPERATURE: 97.6 F | HEART RATE: 72 BPM | SYSTOLIC BLOOD PRESSURE: 120 MMHG | HEIGHT: 67 IN | RESPIRATION RATE: 18 BRPM | DIASTOLIC BLOOD PRESSURE: 82 MMHG | BODY MASS INDEX: 31.71 KG/M2 | OXYGEN SATURATION: 99 %

## 2024-09-18 DIAGNOSIS — I10 HYPERTENSION GOAL BP (BLOOD PRESSURE) < 140/90: ICD-10-CM

## 2024-09-18 DIAGNOSIS — F41.9 ANXIETY: ICD-10-CM

## 2024-09-18 DIAGNOSIS — Z01.818 PREOP GENERAL PHYSICAL EXAM: Primary | ICD-10-CM

## 2024-09-18 DIAGNOSIS — M62.838 MUSCLE SPASM: ICD-10-CM

## 2024-09-18 DIAGNOSIS — F32.4 MAJOR DEPRESSIVE DISORDER WITH SINGLE EPISODE, IN PARTIAL REMISSION (H): ICD-10-CM

## 2024-09-18 DIAGNOSIS — N20.1 CALCULUS OF DISTAL RIGHT URETER: ICD-10-CM

## 2024-09-18 LAB
ATRIAL RATE - MUSE: 70 BPM
DIASTOLIC BLOOD PRESSURE - MUSE: NORMAL MMHG
INTERPRETATION ECG - MUSE: NORMAL
P AXIS - MUSE: 56 DEGREES
PR INTERVAL - MUSE: 158 MS
QRS DURATION - MUSE: 88 MS
QT - MUSE: 384 MS
QTC - MUSE: 414 MS
R AXIS - MUSE: -27 DEGREES
SYSTOLIC BLOOD PRESSURE - MUSE: NORMAL MMHG
T AXIS - MUSE: 23 DEGREES
VENTRICULAR RATE- MUSE: 70 BPM

## 2024-09-18 PROCEDURE — G2211 COMPLEX E/M VISIT ADD ON: HCPCS | Performed by: NURSE PRACTITIONER

## 2024-09-18 PROCEDURE — 99214 OFFICE O/P EST MOD 30 MIN: CPT | Performed by: NURSE PRACTITIONER

## 2024-09-18 PROCEDURE — 93005 ELECTROCARDIOGRAM TRACING: CPT | Performed by: NURSE PRACTITIONER

## 2024-09-18 RX ORDER — CYCLOBENZAPRINE HCL 10 MG
10 TABLET ORAL DAILY PRN
Qty: 60 TABLET | Refills: 3 | Status: SHIPPED | OUTPATIENT
Start: 2024-09-18

## 2024-09-18 RX ORDER — HYDROXYZINE HYDROCHLORIDE 25 MG/1
25-50 TABLET, FILM COATED ORAL EVERY 6 HOURS PRN
Qty: 60 TABLET | Refills: 3 | Status: SHIPPED | OUTPATIENT
Start: 2024-09-18

## 2024-09-18 NOTE — TELEPHONE ENCOUNTER
Chart reviewed, pre-op completed. Hold ACE/ARB DOS.    UA negative. Recent CT shows ureteral stone still present.     PIOTR Genao  Care Coordinator- Urology   [phone]

## 2024-09-18 NOTE — PATIENT INSTRUCTIONS

## 2024-09-18 NOTE — PROGRESS NOTES
Preoperative Evaluation  [facility]  [address] [place] [address]  Phone: [phone]  Fax: [phone]  Primary Provider: Ashley Orta NP  Pre-op Performing Provider: Ashley Orta NP  Sep 18, 2024             9/17/2024   Surgical Information   What procedure is being done? Kidney stone removal   Facility or Hospital where procedure/surgery will be performed: [facility]   Who is doing the procedure / surgery? Dr Sahil Dudley   Date of surgery / procedure: 09/20/2024   Time of surgery / procedure: 10:00 AM   Where do you plan to recover after surgery? at home with family        Fax number for surgical facility: Note does not need to be faxed, will be available electronically in Epic.    Assessment & Plan     The proposed surgical procedure is considered LOW risk.    Preop general physical exam  No contraindication for planned cystoscopy of the right ureter with the right holmiun laser lithotripsy, right retrograde pyelogram and possible right urethral stent placement.  Preoperative guidelines were reviewed.  - EKG 12-lead, tracing only    Calculus of distal right ureter  Woke up during the night/early morning on 8/26/2024 with some right lower abdominal pain.  Went to the ER where she was found to have a 7 mm distal right ureteral stone.  She does have a history of renal stones in the past.  Since then she has had continual right lower abdominal discomfort towards the pelvis.  She had an x-ray on 9/13/2024 with a thought maybe the stone had passed however yesterday she had a CT scan that confirmed stone was still in the right distal ureter.  She is currently still on Flomax daily.     Muscle spasm  Has a history of some muscle tension and spasms around her neck.  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  She has been on cyclobenzaprine in the past and needing a refill.  This medication was refilled today.  Advised  not to take the morning of surgery.  - cyclobenzaprine (FLEXERIL) 10 MG tablet; Take 1 tablet (10 mg) by mouth daily as needed for muscle spasms.    Major depressive disorder with single episode, in partial remission (H24)  Managed with therapy.  No acute concerns today.    Anxiety  Uses hydroxyzine for anxiety on and off as needed.  Is needing a refill of this medication.  Advised not to take the morning of surgery.  - hydrOXYzine HCl (ATARAX) 25 MG tablet; Take 1-2 tablets (25-50 mg) by mouth every 6 hours as needed for anxiety.    Hypertension goal BP (blood pressure) < 140/90  Currently managed with amlodipine-benazepril daily.  I recommend she hold this medication the morning of surgery but can continue after surgery.       The longitudinal plan of care for the diagnosis(es)/condition(s) as documented were addressed during this visit. Due to the added complexity in care, I will continue to support Sujatha in the subsequent management and with ongoing continuity of care.       - No identified additional risk factors other than previously addressed    Preoperative Medication Instructions  Antiplatelet or Anticoagulation Medication Instructions   - Patient is on no antiplatelet or anticoagulation medications.    Additional Medication Instructions   - ACE/ARB: DO NOT TAKE on day of surgery (minimum 11 hours for general anesthesia).    Recommendation  Approval given to proceed with proposed procedure, without further diagnostic evaluation.    Subjective   Sujatha is a 54 year old, presenting for the following:  Pre-Op Exam (Kidney stone removal on 9/20 at [facility] by Dr Dudley)          9/18/2024     8:14 AM   Additional Questions   Roomed by Jade LEÓN related to upcoming procedure: Sujatha is a very pleasant 54-year-old female here today for preoperative valuation prior to having surgery for a right renal stone.  She woke up very early in the morning on 8/26/2024 with a right lower abdominal pain  consistent to pain she has had in the past with renal stones.  She went to the ER where she was found to have a 7 mm renal stone in the right distal ureter.  She has been having ongoing right lower abdominal discomfort/ache ever since.  Denies any urinary symptoms.  Has been afebrile.  Has not needed to use anything over-the-counter for symptoms at this time.  Was prescribed Flomax that she is using daily.  Was given Zofran for as needed but she has not needed this medication.    She is needing refills of her cyclobenzaprine and her hydroxyzine.  She uses the cyclobenzaprine for some muscle spasms in her shoulders and neck that have been occurring on and off for several years.  She was this is related to her work that she does that she sits at a desk and is on a computer a lot.  Denies any new symptoms at this time.  She also uses hydroxyzine for anxiety on and off as needed.  States she finds she uses this medication a couple times a week.        9/17/2024   Pre-Op Questionnaire   Have you ever had a heart attack or stroke? No   Have you ever had surgery on your heart or blood vessels, such as a stent placement, a coronary artery bypass, or surgery on an artery in your head, neck, heart, or legs? No   Do you have chest pain with activity? No   Do you have a history of heart failure? No   Do you currently have a cold, bronchitis or symptoms of other infection? No   Do you have a cough, shortness of breath, or wheezing? No   Do you or anyone in your family have previous history of blood clots? No   Do you or does anyone in your family have a serious bleeding problem such as prolonged bleeding following surgeries or cuts? No   Have you ever had problems with anemia or been told to take iron pills? No   Have you had any abnormal blood loss such as black, tarry or bloody stools, or abnormal vaginal bleeding? No   Have you ever had a blood transfusion? No   Are you willing to have a blood transfusion if it is medically  needed before, during, or after your surgery? Yes   Have you or any of your relatives ever had problems with anesthesia? (!) YES, severe nausea and vomiting after anesthesia.    Do you have sleep apnea, excessive snoring or daytime drowsiness? No   Do you have any artifical heart valves or other implanted medical devices like a pacemaker, defibrillator, or continuous glucose monitor? No   Do you have artificial joints? No   Are you allergic to latex? No        Health Care Directive  Patient does not have a Health Care Directive or Living Will: Discussed advance care planning with patient; however, patient declined at this time.    Preoperative Review of    reviewed - no record of controlled substances prescribed.          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     Priority: Medium    S/P hysterectomy 06/02/2014     Priority: Medium    CARDIOVASCULAR SCREENING; LDL GOAL LESS THAN 160 06/02/2014     Priority: Medium      Past Medical History:   Diagnosis Date    Anxiety     Depression     Hypertension      Past Surgical History:   Procedure Laterality Date    ADENOIDECTOMY      CYSTOSCOPY  2007    HYSTERECTOMY, PAP NO LONGER INDICATED  2010    BSO, cervix removed, for dysmenorrhea    TUBAL LIGATION  1999     BREAST BIOPSY LT  1/2013    left, benign     Current Outpatient Medications   Medication Sig Dispense Refill    amLODIPine-benazepril (LOTREL) 10-40 MG capsule Take 1 capsule by mouth daily 90 capsule 1    cyclobenzaprine (FLEXERIL) 10 MG tablet  Take 1 tablet (10 mg) by mouth daily as needed for muscle spasms 30 tablet 0    dimenhyDRINATE (DRAMAMINE) 50 MG tablet Take 1 tablet (50 mg) by mouth nightly as needed. 30 tablet 0    hydrocortisone, Perianal, (HYDROCORTISONE) 2.5 % cream Place rectally 2 times daily as needed for hemorrhoids 30 g 1    hydrOXYzine (ATARAX) 25 MG tablet Take 1-2 tablets (25-50 mg) by mouth every 6 hours as needed for anxiety 60 tablet 1    LORazepam (ATIVAN) 0.5 MG tablet Take 1 or 2 tablets an hour before dental procedure (you will need to get a ride home). Can also take 1-2 tablets to relieve anxiety while on airplane.  May be used twice in 1 day. 2 tablet 0    ondansetron (ZOFRAN ODT) 4 MG ODT tab Take 1 tablet (4 mg) by mouth every 6 hours as needed for vomiting or nausea. 30 tablet 0    tamsulosin (FLOMAX) 0.4 MG capsule Take 1 capsule (0.4 mg) by mouth daily. 30 capsule 0    valACYclovir (VALTREX) 1000 mg tablet Take 1 tablet (1,000 mg) by mouth daily 90 tablet 0    mupirocin (BACTROBAN) 2 % external ointment Apply topically 3 times daily 15 g 0       Allergies   Allergen Reactions    Hydrochlorothiazide Nausea and Diarrhea    Metronidazole     Wellbutrin [Bupropion]      Tingling sensation      Morphine Rash        Social History     Tobacco Use    Smoking status: Never     Passive exposure: Past    Smokeless tobacco: Never   Substance Use Topics    Alcohol use: No     Alcohol/week: 0.0 standard drinks of alcohol       History   Drug Use No           Constitutional: No fever or unexplained weight loss.  No severe fatigue.    HEENT: Negative for ear pain, changes in hearing, frequent nosebleeds, nasal congestion, runny nose, sore throat, loose teeth, dentures or partials.    Eyes: Denies any changes in vision or eye pain.    Respiratory: Negative for cough, wheezing or shortness of breath.    Cardiovascular: No palpitations, tachycardia, chest pain or lower extremity edema.    Gastrointestinal: Negative for nausea, vomiting,  "uncontrolled heartburn or changes in bowel movements. Right lower abdominal pain-since renal stone.     Genitourinary: Negative for any urinary symptoms or changes in urinary habits.    Integumentary: Negative for any rash or suspicious lesions.    Musculoskeletal: No difficulty with weakness, walking or joint pain.    Neurological: Negative for severe dizziness, headaches or numbness.    Psychiatric: No severe anxiety.  No issues with sleep or significant depression.  Denies recreational drug use or regular use of alcohol.    Allergic/immunologic: Negative for any history of complications with anesthesia.  No history of seasonal allergies.  No known exposure to HIV or hepatitis C.     Objective    /82 (BP Location: Right arm, Patient Position: Sitting)   Pulse 72   Temp 97.6  F (36.4  C)   Resp 18   Ht 1.702 m (5' 7\")   Wt 91.6 kg (202 lb)   LMP  (LMP Unknown)   SpO2 99%   BMI 31.64 kg/m     Estimated body mass index is 31.64 kg/m  as calculated from the following:    Height as of this encounter: 1.702 m (5' 7\").    Weight as of this encounter: 91.6 kg (202 lb).  Physical Exam  Constitutional: In no acute distress.  Clean appearance.  Eyes: PERRLA.  EOMI.  No conjunctival redness.  Ears: Bilateral TMs are intact without any erythema or effusion.  Grossly normal hearing.  Nose: Nares patent bilaterally.  Normal mucosa.  Oropharynx: Normal mucosa.  Dentition and gingiva is appropriate.  Posterior oropharynx without any abnormalities.  Neck: Supple.  Trachea is midline.  No thyromegaly.  Neck is without tenderness or masses.  Cardiovascular: Regular rate and rhythm.  Normal peripheral perfusion.  No edema.  No varicosities.  Respiratory: Lungs are clear bilaterally.  Normal respiratory effort.  Skin: Skin is without significant rashes or lesions.  No suspicious moles.  Gastrointestinal: Soft and flat.  Normal bowel sounds.  Nontender throughout upon palpation.  No organomegaly or masses.  Negative for " CVA tenderness.  Musculoskeletal: Extremities without any cyanosis or clubbing.  No joint swelling or deformities.  Normal range of motion of extremities.  Gait normal.  Able to mount exam table without difficulties.  Psychiatric: Appropriate affect and demeanor.  Memory intact.  Good insight and judgment.  Neurologic: Sensation and temperature of extremities appropriate.  No tremor or involuntary movement noted.      Recent Labs   Lab Test 08/26/24  0747 11/10/23  0843   HGB 14.0 13.8    179    143   POTASSIUM 3.2* 4.6   CR 0.87 0.88   A1C  --  5.4        Diagnostics  No labs were ordered during this visit.   EKG: appears normal, NSR, normal axis, normal intervals, no acute ST/T changes c/w ischemia, no LVH by voltage criteria    Revised Cardiac Risk Index (RCRI)  The patient has the following serious cardiovascular risks for perioperative complications:   - No serious cardiac risks = 0 points     RCRI Interpretation: 0 points: Class I (very low risk - 0.4% complication rate)         Signed Electronically by: Ashley Orta NP  A copy of this evaluation report is provided to the requesting physician.

## 2024-09-19 ENCOUNTER — ANESTHESIA EVENT (OUTPATIENT)
Dept: SURGERY | Facility: AMBULATORY SURGERY CENTER | Age: 54
End: 2024-09-19
Payer: COMMERCIAL

## 2024-09-20 ENCOUNTER — ANESTHESIA (OUTPATIENT)
Dept: SURGERY | Facility: AMBULATORY SURGERY CENTER | Age: 54
End: 2024-09-20
Payer: COMMERCIAL

## 2024-09-20 ENCOUNTER — HOSPITAL ENCOUNTER (OUTPATIENT)
Facility: AMBULATORY SURGERY CENTER | Age: 54
Discharge: HOME | End: 2024-09-20
Attending: UROLOGY
Payer: COMMERCIAL

## 2024-09-20 ENCOUNTER — TELEPHONE (OUTPATIENT)
Dept: UROLOGY | Facility: CLINIC | Age: 54
End: 2024-09-20

## 2024-09-20 VITALS
SYSTOLIC BLOOD PRESSURE: 130 MMHG | HEIGHT: 67 IN | RESPIRATION RATE: 16 BRPM | OXYGEN SATURATION: 96 % | DIASTOLIC BLOOD PRESSURE: 80 MMHG | HEART RATE: 77 BPM | TEMPERATURE: 96.9 F | BODY MASS INDEX: 31.71 KG/M2 | WEIGHT: 202 LBS

## 2024-09-20 DIAGNOSIS — N20.1 CALCULUS OF DISTAL RIGHT URETER: ICD-10-CM

## 2024-09-20 DIAGNOSIS — N20.1 RIGHT URETERAL STONE: ICD-10-CM

## 2024-09-20 PROCEDURE — 52332 CYSTOSCOPY AND TREATMENT: CPT | Mod: RT | Performed by: UROLOGY

## 2024-09-20 PROCEDURE — 99000 SPECIMEN HANDLING OFFICE-LAB: CPT | Performed by: INTERNAL MEDICINE

## 2024-09-20 PROCEDURE — 74420 UROGRAPHY RTRGR +-KUB: CPT | Mod: 26 | Performed by: UROLOGY

## 2024-09-20 PROCEDURE — 82365 CALCULUS SPECTROSCOPY: CPT | Mod: 90 | Performed by: INTERNAL MEDICINE

## 2024-09-20 PROCEDURE — 52352 CYSTOURETERO W/STONE REMOVE: CPT | Mod: RT | Performed by: UROLOGY

## 2024-09-20 DEVICE — STENT, URETERAL, 6FR X 26CM, HYDROPLUS COATING, WITHOUT WIRE, PERCUFLEX PLUS
Type: IMPLANTABLE DEVICE | Site: URETER | Status: NON-FUNCTIONAL
Removed: 2024-09-26

## 2024-09-20 RX ORDER — FENTANYL CITRATE 50 UG/ML
INJECTION, SOLUTION INTRAMUSCULAR; INTRAVENOUS PRN
Status: DISCONTINUED | OUTPATIENT
Start: 2024-09-20 | End: 2024-09-20

## 2024-09-20 RX ORDER — KETOROLAC TROMETHAMINE 30 MG/ML
INJECTION, SOLUTION INTRAMUSCULAR; INTRAVENOUS PRN
Status: DISCONTINUED | OUTPATIENT
Start: 2024-09-20 | End: 2024-09-20

## 2024-09-20 RX ORDER — FENTANYL CITRATE 0.05 MG/ML
50 INJECTION, SOLUTION INTRAMUSCULAR; INTRAVENOUS EVERY 5 MIN PRN
Status: DISCONTINUED | OUTPATIENT
Start: 2024-09-20 | End: 2024-09-21 | Stop reason: HOSPADM

## 2024-09-20 RX ORDER — PHENAZOPYRIDINE HYDROCHLORIDE 200 MG/1
200 TABLET, FILM COATED ORAL 3 TIMES DAILY PRN
Qty: 12 TABLET | Refills: 0 | Status: SHIPPED | OUTPATIENT
Start: 2024-09-20

## 2024-09-20 RX ORDER — SODIUM CHLORIDE, SODIUM LACTATE, POTASSIUM CHLORIDE, CALCIUM CHLORIDE 600; 310; 30; 20 MG/100ML; MG/100ML; MG/100ML; MG/100ML
INJECTION, SOLUTION INTRAVENOUS CONTINUOUS
Status: DISCONTINUED | OUTPATIENT
Start: 2024-09-20 | End: 2024-09-21 | Stop reason: HOSPADM

## 2024-09-20 RX ORDER — LIDOCAINE 40 MG/G
CREAM TOPICAL
Status: DISCONTINUED | OUTPATIENT
Start: 2024-09-20 | End: 2024-09-21 | Stop reason: HOSPADM

## 2024-09-20 RX ORDER — ACETAMINOPHEN 650 MG/1
650 SUPPOSITORY RECTAL ONCE
Status: DISCONTINUED | OUTPATIENT
Start: 2024-09-20 | End: 2024-09-21 | Stop reason: HOSPADM

## 2024-09-20 RX ORDER — PROPOFOL 10 MG/ML
INJECTION, EMULSION INTRAVENOUS PRN
Status: DISCONTINUED | OUTPATIENT
Start: 2024-09-20 | End: 2024-09-20

## 2024-09-20 RX ORDER — DEXAMETHASONE SODIUM PHOSPHATE 4 MG/ML
INJECTION, SOLUTION INTRA-ARTICULAR; INTRALESIONAL; INTRAMUSCULAR; INTRAVENOUS; SOFT TISSUE PRN
Status: DISCONTINUED | OUTPATIENT
Start: 2024-09-20 | End: 2024-09-20

## 2024-09-20 RX ORDER — GLYCOPYRROLATE 0.2 MG/ML
INJECTION, SOLUTION INTRAMUSCULAR; INTRAVENOUS PRN
Status: DISCONTINUED | OUTPATIENT
Start: 2024-09-20 | End: 2024-09-20

## 2024-09-20 RX ORDER — ONDANSETRON 2 MG/ML
INJECTION INTRAMUSCULAR; INTRAVENOUS PRN
Status: DISCONTINUED | OUTPATIENT
Start: 2024-09-20 | End: 2024-09-20

## 2024-09-20 RX ORDER — FENTANYL CITRATE 0.05 MG/ML
25 INJECTION, SOLUTION INTRAMUSCULAR; INTRAVENOUS EVERY 5 MIN PRN
Status: DISCONTINUED | OUTPATIENT
Start: 2024-09-20 | End: 2024-09-21 | Stop reason: HOSPADM

## 2024-09-20 RX ORDER — OXYCODONE HYDROCHLORIDE 10 MG/1
10 TABLET ORAL
Status: DISCONTINUED | OUTPATIENT
Start: 2024-09-20 | End: 2024-09-21 | Stop reason: HOSPADM

## 2024-09-20 RX ORDER — FENTANYL CITRATE 0.05 MG/ML
25 INJECTION, SOLUTION INTRAMUSCULAR; INTRAVENOUS
Status: DISCONTINUED | OUTPATIENT
Start: 2024-09-20 | End: 2024-09-21 | Stop reason: HOSPADM

## 2024-09-20 RX ORDER — OXYCODONE HYDROCHLORIDE 5 MG/1
5 TABLET ORAL
Status: DISCONTINUED | OUTPATIENT
Start: 2024-09-20 | End: 2024-09-21 | Stop reason: HOSPADM

## 2024-09-20 RX ORDER — ONDANSETRON 4 MG/1
4 TABLET, ORALLY DISINTEGRATING ORAL EVERY 30 MIN PRN
Status: DISCONTINUED | OUTPATIENT
Start: 2024-09-20 | End: 2024-09-21 | Stop reason: HOSPADM

## 2024-09-20 RX ORDER — CEFAZOLIN SODIUM 2 G/100ML
2 INJECTION, SOLUTION INTRAVENOUS SEE ADMIN INSTRUCTIONS
Status: DISCONTINUED | OUTPATIENT
Start: 2024-09-20 | End: 2024-09-21 | Stop reason: HOSPADM

## 2024-09-20 RX ORDER — MEPERIDINE HYDROCHLORIDE 25 MG/ML
12.5 INJECTION INTRAMUSCULAR; INTRAVENOUS; SUBCUTANEOUS EVERY 5 MIN PRN
Status: DISCONTINUED | OUTPATIENT
Start: 2024-09-20 | End: 2024-09-21 | Stop reason: HOSPADM

## 2024-09-20 RX ORDER — PROPOFOL 10 MG/ML
INJECTION, EMULSION INTRAVENOUS CONTINUOUS PRN
Status: DISCONTINUED | OUTPATIENT
Start: 2024-09-20 | End: 2024-09-20

## 2024-09-20 RX ORDER — OXYBUTYNIN CHLORIDE 5 MG/1
5 TABLET, EXTENDED RELEASE ORAL DAILY
Qty: 14 TABLET | Refills: 0 | Status: SHIPPED | OUTPATIENT
Start: 2024-09-20

## 2024-09-20 RX ORDER — TAMSULOSIN HYDROCHLORIDE 0.4 MG/1
0.4 CAPSULE ORAL DAILY
Qty: 30 CAPSULE | Refills: 0 | Status: SHIPPED | OUTPATIENT
Start: 2024-09-20

## 2024-09-20 RX ORDER — NALOXONE HYDROCHLORIDE 0.4 MG/ML
0.1 INJECTION, SOLUTION INTRAMUSCULAR; INTRAVENOUS; SUBCUTANEOUS
Status: DISCONTINUED | OUTPATIENT
Start: 2024-09-20 | End: 2024-09-21 | Stop reason: HOSPADM

## 2024-09-20 RX ORDER — ACETAMINOPHEN 325 MG/1
975 TABLET ORAL ONCE
Status: COMPLETED | OUTPATIENT
Start: 2024-09-20 | End: 2024-09-20

## 2024-09-20 RX ORDER — ACETAMINOPHEN 325 MG/1
975 TABLET ORAL ONCE
Status: DISCONTINUED | OUTPATIENT
Start: 2024-09-20 | End: 2024-09-21 | Stop reason: HOSPADM

## 2024-09-20 RX ORDER — CEFAZOLIN SODIUM 2 G/100ML
2 INJECTION, SOLUTION INTRAVENOUS
Status: COMPLETED | OUTPATIENT
Start: 2024-09-20 | End: 2024-09-20

## 2024-09-20 RX ORDER — HYDROMORPHONE HCL IN WATER/PF 6 MG/30 ML
0.2 PATIENT CONTROLLED ANALGESIA SYRINGE INTRAVENOUS EVERY 5 MIN PRN
Status: DISCONTINUED | OUTPATIENT
Start: 2024-09-20 | End: 2024-09-21 | Stop reason: HOSPADM

## 2024-09-20 RX ORDER — HYDROMORPHONE HCL IN WATER/PF 6 MG/30 ML
0.4 PATIENT CONTROLLED ANALGESIA SYRINGE INTRAVENOUS EVERY 5 MIN PRN
Status: DISCONTINUED | OUTPATIENT
Start: 2024-09-20 | End: 2024-09-21 | Stop reason: HOSPADM

## 2024-09-20 RX ORDER — ONDANSETRON 2 MG/ML
4 INJECTION INTRAMUSCULAR; INTRAVENOUS EVERY 30 MIN PRN
Status: DISCONTINUED | OUTPATIENT
Start: 2024-09-20 | End: 2024-09-21 | Stop reason: HOSPADM

## 2024-09-20 RX ORDER — LIDOCAINE HYDROCHLORIDE 20 MG/ML
INJECTION, SOLUTION INFILTRATION; PERINEURAL PRN
Status: DISCONTINUED | OUTPATIENT
Start: 2024-09-20 | End: 2024-09-20

## 2024-09-20 RX ADMIN — FENTANYL CITRATE 50 MCG: 50 INJECTION, SOLUTION INTRAMUSCULAR; INTRAVENOUS at 10:37

## 2024-09-20 RX ADMIN — SODIUM CHLORIDE, SODIUM LACTATE, POTASSIUM CHLORIDE, CALCIUM CHLORIDE: 600; 310; 30; 20 INJECTION, SOLUTION INTRAVENOUS at 09:45

## 2024-09-20 RX ADMIN — PROPOFOL 200 MG: 10 INJECTION, EMULSION INTRAVENOUS at 10:37

## 2024-09-20 RX ADMIN — CEFAZOLIN SODIUM 2 G: 2 INJECTION, SOLUTION INTRAVENOUS at 10:32

## 2024-09-20 RX ADMIN — DEXAMETHASONE SODIUM PHOSPHATE 10 MG: 4 INJECTION, SOLUTION INTRA-ARTICULAR; INTRALESIONAL; INTRAMUSCULAR; INTRAVENOUS; SOFT TISSUE at 10:44

## 2024-09-20 RX ADMIN — GLYCOPYRROLATE 0.2 MG: 0.2 INJECTION, SOLUTION INTRAMUSCULAR; INTRAVENOUS at 10:37

## 2024-09-20 RX ADMIN — PROPOFOL 180 MCG/KG/MIN: 10 INJECTION, EMULSION INTRAVENOUS at 10:39

## 2024-09-20 RX ADMIN — ONDANSETRON 4 MG: 2 INJECTION INTRAMUSCULAR; INTRAVENOUS at 10:53

## 2024-09-20 RX ADMIN — LIDOCAINE HYDROCHLORIDE 3 ML: 20 INJECTION, SOLUTION INFILTRATION; PERINEURAL at 10:37

## 2024-09-20 RX ADMIN — KETOROLAC TROMETHAMINE 15 MG: 30 INJECTION, SOLUTION INTRAMUSCULAR; INTRAVENOUS at 10:52

## 2024-09-20 RX ADMIN — ACETAMINOPHEN 975 MG: 325 TABLET ORAL at 09:38

## 2024-09-20 NOTE — DISCHARGE INSTRUCTIONS
You have received 975 mg of Acetaminophen (Tylenol) at 9:40AM. Please do not take an additional dose of Tylenol until after 3:40PM     Do not exceed 4,000 mg of acetaminophen during a 24 hour period and keep in mind that acetaminophen can also be found in many over-the-counter cold medications as well as narcotics that may be given for pain.        You received a dose of IV Toradol at 10:50AM. Please do not take any additional Ibuprofen/NSAID products (Aleve/Advil/Motrin/Naproxen/Celebrex) until after 4:50PM          If you have any questions or concerns regarding your procedure, please contact Dr. Dudley, his office number is [phone].

## 2024-09-20 NOTE — OP NOTE
PREOPERATIVE DIAGNOSIS:  Right distal ureteral stone  POSTOPERATIVE DIAGNOSIS: Same  PROCEDURES PERFORMED:    Cystoscopy  Right ureteroscopy with stone basket extraction  Right retrograde pyelogram with interpretation of imaging  Right ureteral stent placement    STAFF SURGEON: Sahil Dudley MD  RESIDENT(S) none    ANESTHESIA: General  ESTIMATED BLOOD LOSS: 1 ml  COMPLICATIONS: None  SPECIMEN: Right distal ureteral stone  URETERAL STENT(S): 6 Grenadian by 26 cm right double-J stent    SIGNIFICANT FINDINGS: Right ureteral stone for analysis    BRIEF OPERATIVE INDICATIONS: Patient presented with longstanding right distal ureteral stone.  After a discussion of all risks, benefits, and alternatives, the patient elected to proceed with definitive stone management. The patient understands the potential need for more than one procedure to eliminate all stone burden.      DESCRIPTION OF PROCEDURE:  After informed consent was obtained, the patient was transported to the operating room & placed supine on the table. After adequate anesthesia was induced, the patient was placed in lithotomy and prepped and draped in the usual sterile fashion. A timeout was taken to confirm correct patient, procedure and laterality. Pre-operative IV antibiotics were administered.     We started the procedure by performing cystoscopy.  The bladder was unremarkable.  The right ureteral orifice was orthotopic.  We passed a sensor wire up into the right kidney.  We gently dilated the orifice with an 8 Grenadian and a 10 Grenadian dilator.  We passed the semirigid ureteroscope adjacent to the wire and identified the obstructing stone about 4 cm from the orifice.  It appeared to be calcium oxalate dihydrate.  We used a stone basket to manipulate it and then extracted it intact.  We performed a retrograde pyelogram showing moderate to severe right-sided hydronephrosis.  We replaced our wire and passed a 6 Grenadian by 26 cm right double-J stent.  We  confirmed a full curl in the kidney as well as in the bladder.  We did leave a string on the stent to facilitate removal next week.    The bladder was drained and the patient was awoken from anesthesia and transported to the postanesthesia care unit in stable condition.     POSTOP PLAN:  Stent removal next Thursday

## 2024-09-20 NOTE — TELEPHONE ENCOUNTER
[facility] Call Center    Phone Message    May a detailed message be left on voicemail: no     Reason for Call: Other: Patient called and stated that she is wondering when she can take a bath after having surgery. Please call patient back to discuss.     Action Taken: Other: [facility]    Travel Screening: Not Applicable

## 2024-09-20 NOTE — ANESTHESIA PREPROCEDURE EVALUATION
Anesthesia Pre-Procedure Evaluation    Patient: Lawanda Katz   MRN: [id number] : 1970        Procedure : Procedure(s):  Cystoscopy, Right Ureteroscopy, Right Holmium Laser Lithotripsy, Right Retrograde Pyelogram, Possible Right Ureteral Stent Placement          Past Medical History:   Diagnosis Date    Anxiety     Depression     Gastroesophageal reflux disease     Hypertension     Motion sickness     Obese     PONV (postoperative nausea and vomiting)     Renal disease       Past Surgical History:   Procedure Laterality Date    ADENOIDECTOMY      CYSTOSCOPY      HYSTERECTOMY, PAP NO LONGER INDICATED      BSO, cervix removed, for dysmenorrhea    TUBAL LIGATION       BREAST BIOPSY LT  2013    left, benign      Allergies   Allergen Reactions    Hydrochlorothiazide Nausea and Diarrhea    Metronidazole Nausea    Wellbutrin [Bupropion]      Tingling sensation      Morphine Rash      Social History     Tobacco Use    Smoking status: Never     Passive exposure: Past    Smokeless tobacco: Never   Substance Use Topics    Alcohol use: Not Currently     Comment: socially      Wt Readings from Last 1 Encounters:   24 91.6 kg (202 lb)        Anesthesia Evaluation   Pt has had prior anesthetic.     History of anesthetic complications  - PONV.      ROS/MED HX  ENT/Pulmonary:       Neurologic:       Cardiovascular:     (+)  hypertension- -   -  - -                                      METS/Exercise Tolerance:     Hematologic:       Musculoskeletal:       GI/Hepatic:     (+) GERD, Asymptomatic on medication,                  Renal/Genitourinary:     (+)       Nephrolithiasis ,       Endo:     (+)               Obesity (bmi 31),       Psychiatric/Substance Use:     (+) psychiatric history anxiety and depression       Infectious Disease:       Malignancy:       Other:            Physical Exam    Airway        Mallampati: II   TM distance: > 3 FB   Neck ROM: full   Mouth opening: > 3 cm    Respiratory  "Devices and Support         Dental       (+) Multiple crowns, permanant bridges    B=Bridge, C=Chipped, L=Loose, M=Missing    Cardiovascular   cardiovascular exam normal          Pulmonary   pulmonary exam normal                OUTSIDE LABS:  CBC:   Lab Results   Component Value Date    WBC 7.0 08/26/2024    WBC 4.2 11/10/2023    HGB 14.0 08/26/2024    HGB 13.8 11/10/2023    HCT 41.0 08/26/2024    HCT 40.2 11/10/2023     08/26/2024     11/10/2023     BMP:   Lab Results   Component Value Date     08/26/2024     11/10/2023    POTASSIUM 3.2 (L) 08/26/2024    POTASSIUM 4.6 11/10/2023    CHLORIDE 102 08/26/2024    CHLORIDE 105 11/10/2023    CO2 24 08/26/2024    CO2 27 11/10/2023    BUN 8.7 08/26/2024    BUN 10.5 11/10/2023    CR 0.87 08/26/2024    CR 0.88 11/10/2023     (H) 08/26/2024    GLC 93 11/10/2023     COAGS: No results found for: \"PTT\", \"INR\", \"FIBR\"  POC: No results found for: \"BGM\", \"HCG\", \"HCGS\"  HEPATIC:   Lab Results   Component Value Date    ALBUMIN 4.3 11/10/2023    PROTTOTAL 7.5 11/10/2023    ALT 28 11/10/2023    AST 34 11/10/2023    ALKPHOS 87 11/10/2023    BILITOTAL 0.5 11/10/2023     OTHER:   Lab Results   Component Value Date    A1C 5.4 11/10/2023    SHALOM 9.0 08/26/2024    LIPASE 191 01/23/2017    AMYLASE 36 10/05/2016    TSH 2.60 11/10/2023       Anesthesia Plan    ASA Status:  2    NPO Status:  NPO Appropriate    Anesthesia Type: General.     - Airway: LMA   Induction: Propofol.   Maintenance: TIVA.        Consents    Anesthesia Plan(s) and associated risks, benefits, and realistic alternatives discussed. Questions answered and patient/representative(s) expressed understanding.     - Discussed:     - Discussed with:  Patient            Postoperative Care    Pain management: Multi-modal analgesia.   PONV prophylaxis: Ondansetron (or other 5HT-3), Dexamethasone or Solumedrol     Comments:    Other Comments: Reviewed anesthetic options and risks, including risk of dental " "trauma. Patient agrees to proceed.     Toradol if OK with surgeon             Darren Treadwell MD    I have reviewed the pertinent notes and labs in the chart from the past 30 days and (re)examined the patient.  Any updates or changes from those notes are reflected in this note.    # Hypokalemia: Lowest K = 3.2 mmol/L in last 30 days, will replace as needed           # Obesity: Estimated body mass index is 31.64 kg/m  as calculated from the following:    Height as of this encounter: 1.702 m (5' 7\").    Weight as of this encounter: 91.6 kg (202 lb).      "

## 2024-09-20 NOTE — ANESTHESIA CARE TRANSFER NOTE
Patient: Lawanda Katz    Procedure: Procedure(s):  Cystoscopy, Right Ureteroscopy, Right Retrograde Pyelogram, Right Stone Basket Extraction, Right Ureteral Stent Placement       Diagnosis: Calculus of distal right ureter [N20.1]  Diagnosis Additional Information: No value filed.    Anesthesia Type:   General     Note:    Oropharynx: oropharynx clear of all foreign objects and spontaneously breathing  Level of Consciousness: drowsy  Oxygen Supplementation: room air    Independent Airway: airway patency satisfactory and stable  Dentition: dentition unchanged  Vital Signs Stable: post-procedure vital signs reviewed and stable  Report to RN Given: handoff report given  Patient transferred to: PACU    Handoff Report: Identifed the Patient, Identified the Reponsible Provider, Reviewed the pertinent medical history, Discussed the surgical course, Reviewed Intra-OP anesthesia mangement and issues during anesthesia, Set expectations for post-procedure period and Allowed opportunity for questions and acknowledgement of understanding      Vitals:  Vitals Value Taken Time   /75 09/20/24 1100   Temp 96.9  F (36.1  C) 09/20/24 1100   Pulse 76 09/02/24 1100   Resp 18 09/20/24 1100   SpO2 99 % 09/20/24 1100       Electronically Signed By: DANIELE Dawson CRNA  September 20, 2024  11:03 AM

## 2024-09-20 NOTE — PROGRESS NOTES
I personally met with Lawanda Katz and discussed their current medical situation.     We reviewed the nature of planned surgery, the risks benefits and complications and treatment alternatives.      Shared decision made to proceed with right ureteroscopic stone treatment    We also reviewed the following financial disclosures potentially applicable to their surgery  I informed the patient that I do have a financial consulting relationship with [employer], a medical device company that makes products which could be used during the procedure  I presented an opportunity to ask questions  and informed them that they were capable of rescinding their consent or not proceeding without penalty if they desire to do so.

## 2024-09-20 NOTE — ANESTHESIA POSTPROCEDURE EVALUATION
Patient: Lawanda Katz    Procedure: Procedure(s):  Cystoscopy, Right Ureteroscopy, Right Retrograde Pyelogram, Right Stone Basket Extraction, Right Ureteral Stent Placement       Anesthesia Type:  General    Note:  Disposition: Outpatient   Postop Pain Control: Uneventful            Sign Out: Well controlled pain   PONV: No   Neuro/Psych: Uneventful            Sign Out: Acceptable/Baseline neuro status   Airway/Respiratory: Uneventful            Sign Out: Acceptable/Baseline resp. status   CV/Hemodynamics: Uneventful            Sign Out: Acceptable CV status; No obvious hypovolemia; No obvious fluid overload   Other NRE: NONE   DID A NON-ROUTINE EVENT OCCUR? No           Last vitals:  Vitals Value Taken Time   /70 09/20/24 1115   Temp 96.9  F (36.1  C) 09/20/24 1100   Pulse 82 09/20/24 1111   Resp 18 09/20/24 1100   SpO2 95 % 09/20/24 1115   Vitals shown include unfiled device data.    Electronically Signed By: Darren Treadwell MD  September 20, 2024  12:14 PM

## 2024-09-24 LAB
APPEARANCE STONE: NORMAL
COMPN STONE: NORMAL
SPECIMEN WT: 33 MG

## 2024-09-25 ENCOUNTER — TELEPHONE (OUTPATIENT)
Dept: UROLOGY | Facility: CLINIC | Age: 54
End: 2024-09-25
Payer: COMMERCIAL

## 2024-09-25 NOTE — TELEPHONE ENCOUNTER
Request ordered from [facility] to send patient 2 24hour testing kits.  Sarah Simpson RN

## 2024-09-26 ENCOUNTER — ALLIED HEALTH/NURSE VISIT (OUTPATIENT)
Dept: UROLOGY | Facility: CLINIC | Age: 54
End: 2024-09-26
Payer: COMMERCIAL

## 2024-09-26 DIAGNOSIS — N20.0 NEPHROLITHIASIS: Primary | ICD-10-CM

## 2024-09-26 NOTE — PROGRESS NOTES
Pt referred for stent removal by Dr. Dudley.    Patient educated regarding stent removal procedure and possible symptoms after removal.  Patient voiced understanding of information.  Handout given to patient.      Stent removed without difficulty.    Follow up scheduled.     Supervising provider of the day, Yosef Lares, was available if needed.     PIOTR Genao  Care Coordinator- Urology   [phone]

## 2024-09-30 ENCOUNTER — LAB (OUTPATIENT)
Dept: LAB | Facility: CLINIC | Age: 54
End: 2024-09-30
Payer: COMMERCIAL

## 2024-09-30 DIAGNOSIS — N20.0 NEPHROLITHIASIS: ICD-10-CM

## 2024-09-30 DIAGNOSIS — R30.0 DYSURIA: ICD-10-CM

## 2024-09-30 LAB
ALBUMIN UR-MCNC: 100 MG/DL
APPEARANCE UR: CLEAR
BACTERIA #/AREA URNS HPF: ABNORMAL /HPF
BILIRUB UR QL STRIP: NEGATIVE
CAOX CRY #/AREA URNS HPF: ABNORMAL /HPF
COLOR UR AUTO: YELLOW
GLUCOSE UR STRIP-MCNC: 100 MG/DL
HGB UR QL STRIP: ABNORMAL
KETONES UR STRIP-MCNC: NEGATIVE MG/DL
LEUKOCYTE ESTERASE UR QL STRIP: ABNORMAL
MUCOUS THREADS #/AREA URNS LPF: PRESENT /LPF
NITRATE UR QL: NEGATIVE
PH UR STRIP: 5 [PH] (ref 5–8)
RBC #/AREA URNS AUTO: ABNORMAL /HPF
SP GR UR STRIP: >=1.03 (ref 1–1.03)
SQUAMOUS #/AREA URNS AUTO: ABNORMAL /LPF
UROBILINOGEN UR STRIP-ACNC: 0.2 E.U./DL
WBC #/AREA URNS AUTO: ABNORMAL /HPF

## 2024-09-30 PROCEDURE — 87086 URINE CULTURE/COLONY COUNT: CPT

## 2024-09-30 PROCEDURE — 81001 URINALYSIS AUTO W/SCOPE: CPT

## 2024-10-02 LAB — BACTERIA UR CULT: NORMAL

## 2024-10-09 DIAGNOSIS — N20.1 RIGHT URETERAL STONE: ICD-10-CM

## 2024-10-10 RX ORDER — TAMSULOSIN HYDROCHLORIDE 0.4 MG/1
0.4 CAPSULE ORAL DAILY
Qty: 90 CAPSULE | Refills: 1 | OUTPATIENT
Start: 2024-10-10

## 2024-10-11 ENCOUNTER — PATIENT OUTREACH (OUTPATIENT)
Dept: CARE COORDINATION | Facility: CLINIC | Age: 54
End: 2024-10-11
Payer: COMMERCIAL

## 2024-10-25 ENCOUNTER — PATIENT OUTREACH (OUTPATIENT)
Dept: CARE COORDINATION | Facility: CLINIC | Age: 54
End: 2024-10-25
Payer: COMMERCIAL

## 2025-01-04 ENCOUNTER — HEALTH MAINTENANCE LETTER (OUTPATIENT)
Age: 55
End: 2025-01-04

## 2025-02-05 ENCOUNTER — MYC REFILL (OUTPATIENT)
Dept: INTERNAL MEDICINE | Facility: CLINIC | Age: 55
End: 2025-02-05
Payer: COMMERCIAL

## 2025-02-05 DIAGNOSIS — K64.4 EXTERNAL HEMORRHOIDS: ICD-10-CM

## 2025-02-05 RX ORDER — HYDROCORTISONE 25 MG/G
CREAM TOPICAL 2 TIMES DAILY PRN
Qty: 30 G | Refills: 1 | Status: SHIPPED | OUTPATIENT
Start: 2025-02-05

## 2025-03-10 ENCOUNTER — LAB (OUTPATIENT)
Dept: LAB | Facility: CLINIC | Age: 55
End: 2025-03-10
Payer: COMMERCIAL

## 2025-03-10 DIAGNOSIS — N20.0 NEPHROLITHIASIS: ICD-10-CM

## 2025-03-10 DIAGNOSIS — R30.0 DYSURIA: ICD-10-CM

## 2025-03-10 LAB
ALBUMIN UR-MCNC: 30 MG/DL
APPEARANCE UR: CLEAR
BACTERIA #/AREA URNS HPF: ABNORMAL /HPF
BILIRUB UR QL STRIP: ABNORMAL
COLOR UR AUTO: ABNORMAL
GLUCOSE UR STRIP-MCNC: NEGATIVE MG/DL
HGB UR QL STRIP: NEGATIVE
HYALINE CASTS #/AREA URNS LPF: ABNORMAL /LPF
KETONES UR STRIP-MCNC: ABNORMAL MG/DL
LEUKOCYTE ESTERASE UR QL STRIP: NEGATIVE
MUCOUS THREADS #/AREA URNS LPF: PRESENT /LPF
NITRATE UR QL: NEGATIVE
PH UR STRIP: 5.5 [PH] (ref 5–8)
RBC #/AREA URNS AUTO: ABNORMAL /HPF
SP GR UR STRIP: >=1.03 (ref 1–1.03)
SQUAMOUS #/AREA URNS AUTO: ABNORMAL /LPF
UROBILINOGEN UR STRIP-ACNC: 0.2 E.U./DL
WBC #/AREA URNS AUTO: ABNORMAL /HPF

## 2025-03-10 PROCEDURE — 81001 URINALYSIS AUTO W/SCOPE: CPT

## 2025-03-10 PROCEDURE — 87086 URINE CULTURE/COLONY COUNT: CPT

## 2025-03-11 LAB — BACTERIA UR CULT: NO GROWTH

## 2025-03-14 ENCOUNTER — TRANSFERRED RECORDS (OUTPATIENT)
Dept: HEALTH INFORMATION MANAGEMENT | Facility: CLINIC | Age: 55
End: 2025-03-14
Payer: COMMERCIAL

## 2025-03-19 ENCOUNTER — MYC REFILL (OUTPATIENT)
Dept: INTERNAL MEDICINE | Facility: CLINIC | Age: 55
End: 2025-03-19
Payer: COMMERCIAL

## 2025-03-19 DIAGNOSIS — I10 HYPERTENSION GOAL BP (BLOOD PRESSURE) < 140/90: ICD-10-CM

## 2025-03-20 ENCOUNTER — OFFICE VISIT (OUTPATIENT)
Dept: FAMILY MEDICINE | Facility: CLINIC | Age: 55
End: 2025-03-20
Payer: COMMERCIAL

## 2025-03-20 VITALS
TEMPERATURE: 98.5 F | HEART RATE: 63 BPM | BODY MASS INDEX: 31.7 KG/M2 | RESPIRATION RATE: 18 BRPM | SYSTOLIC BLOOD PRESSURE: 134 MMHG | HEIGHT: 67 IN | DIASTOLIC BLOOD PRESSURE: 86 MMHG | WEIGHT: 201.94 LBS | OXYGEN SATURATION: 99 %

## 2025-03-20 DIAGNOSIS — J01.00 ACUTE MAXILLARY SINUSITIS, RECURRENCE NOT SPECIFIED: Primary | ICD-10-CM

## 2025-03-20 RX ORDER — AMLODIPINE AND BENAZEPRIL HYDROCHLORIDE 10; 40 MG/1; MG/1
1 CAPSULE ORAL DAILY
Qty: 90 CAPSULE | Refills: 1 | Status: SHIPPED | OUTPATIENT
Start: 2025-03-20

## 2025-03-20 NOTE — PROGRESS NOTES
"Assessment & Plan     Acute maxillary sinusitis, recurrence not specified  Discussed afrin use for 3 days in a row maximum. Could use flonase on regular basis.  Can continue tylenol-limit ibuprofen due to hypertension.  Humidification, warm packs on face. Follow up if worsening or not improving over the next 3-5 days.    - amoxicillin-clavulanate (AUGMENTIN) 875-125 MG tablet; Take 1 tablet by mouth 2 times daily for 7 days.          BMI  Estimated body mass index is 31.62 kg/m  as calculated from the following:    Height as of this encounter: 1.702 m (5' 7.01\").    Weight as of this encounter: 91.6 kg (201 lb 15.1 oz).             Subjective   Sujatha is a 55 year old, presenting for the following health issues:  Sinus Problem (Sinus pain x10 days.)        3/20/2025    11:44 AM   Additional Questions   Roomed by PIOTR Armando     Sinus Problem   This is a new problem. The current episode started more than 1 week ago. The problem has not changed since onset.There has been no fever. The pain is mild. The pain has been Constant since onset.       Sinus symptoms for 10 days. Patient has tried over the counter treatments including afrin, tylenol and ibuprofen. Patient reports these were helpful however has persisting symptoms. Has throbbing pain specifically on left maxillary sinus.                Objective    /86 (BP Location: Right arm, Patient Position: Sitting, Cuff Size: Adult Regular)   Pulse 63   Temp 98.5  F (36.9  C) (Tympanic)   Resp 18   Ht 1.702 m (5' 7.01\")   Wt 91.6 kg (201 lb 15.1 oz)   LMP  (LMP Unknown)   SpO2 99%   BMI 31.62 kg/m    Body mass index is 31.62 kg/m .  Physical Exam  Constitutional:       Appearance: Normal appearance.   HENT:      Right Ear: A middle ear effusion is present.      Left Ear: A middle ear effusion is present.      Nose:      Right Sinus: No maxillary sinus tenderness or frontal sinus tenderness.      Left Sinus: Maxillary sinus tenderness present. No frontal sinus " tenderness.      Mouth/Throat:      Mouth: Mucous membranes are moist.      Pharynx: No posterior oropharyngeal erythema.      Tonsils: No tonsillar exudate. 1+ on the right. 1+ on the left.   Cardiovascular:      Rate and Rhythm: Normal rate and regular rhythm.      Heart sounds: Normal heart sounds.   Pulmonary:      Effort: Pulmonary effort is normal.      Breath sounds: Normal breath sounds.   Lymphadenopathy:      Cervical: No cervical adenopathy.   Neurological:      General: No focal deficit present.      Mental Status: She is alert and oriented to person, place, and time.   Psychiatric:         Mood and Affect: Mood normal.                    Signed Electronically by: DANIELE JENSEN CNP

## 2025-06-11 ENCOUNTER — MYC MEDICAL ADVICE (OUTPATIENT)
Dept: INTERNAL MEDICINE | Facility: CLINIC | Age: 55
End: 2025-06-11
Payer: COMMERCIAL

## 2025-06-11 NOTE — LETTER
6/11/2025      Lawanda Katz  [address] [place] [address]      To Whom it May Concern,     Lawanda Katz  is under my care. I am familiar with their history and the functional limitations imposed by their health condition.     Due to this health condition, Lawanda may have certain difficulties in coping. I believe and emotional support animal will help to alleviate these challenges by enhancing their day-to-day functionality and thus the presence of this animal is necessary for supporting their health during travel and in her day to day life. Please feel free to contact my office with any questions or concerns.     Sincerely,         Ashley Orta, LORENA   Sincerely,        Ashley Orta, NP    Electronically signed

## 2025-08-15 SDOH — HEALTH STABILITY: PHYSICAL HEALTH: ON AVERAGE, HOW MANY MINUTES DO YOU ENGAGE IN EXERCISE AT THIS LEVEL?: 60 MIN

## 2025-08-15 SDOH — HEALTH STABILITY: PHYSICAL HEALTH: ON AVERAGE, HOW MANY DAYS PER WEEK DO YOU ENGAGE IN MODERATE TO STRENUOUS EXERCISE (LIKE A BRISK WALK)?: 2 DAYS

## 2025-08-15 ASSESSMENT — SOCIAL DETERMINANTS OF HEALTH (SDOH): HOW OFTEN DO YOU GET TOGETHER WITH FRIENDS OR RELATIVES?: ONCE A WEEK

## 2025-08-18 ENCOUNTER — OFFICE VISIT (OUTPATIENT)
Dept: INTERNAL MEDICINE | Facility: CLINIC | Age: 55
End: 2025-08-18
Payer: COMMERCIAL

## 2025-08-18 VITALS
WEIGHT: 194 LBS | HEIGHT: 66 IN | OXYGEN SATURATION: 99 % | SYSTOLIC BLOOD PRESSURE: 130 MMHG | TEMPERATURE: 97.6 F | DIASTOLIC BLOOD PRESSURE: 80 MMHG | RESPIRATION RATE: 18 BRPM | HEART RATE: 56 BPM | BODY MASS INDEX: 31.18 KG/M2

## 2025-08-18 DIAGNOSIS — I10 HYPERTENSION GOAL BP (BLOOD PRESSURE) < 140/90: ICD-10-CM

## 2025-08-18 DIAGNOSIS — M62.838 MUSCLE SPASM: ICD-10-CM

## 2025-08-18 DIAGNOSIS — F41.9 ANXIETY: ICD-10-CM

## 2025-08-18 DIAGNOSIS — B00.1 RECURRENT COLD SORES: ICD-10-CM

## 2025-08-18 DIAGNOSIS — R07.9 CHEST PAIN, UNSPECIFIED TYPE: ICD-10-CM

## 2025-08-18 DIAGNOSIS — Z00.00 ENCOUNTER FOR ANNUAL PHYSICAL EXAM: Primary | ICD-10-CM

## 2025-08-18 DIAGNOSIS — Z12.31 VISIT FOR SCREENING MAMMOGRAM: ICD-10-CM

## 2025-08-18 LAB
ERYTHROCYTE [DISTWIDTH] IN BLOOD BY AUTOMATED COUNT: 12.4 % (ref 10–15)
HCT VFR BLD AUTO: 41.9 % (ref 35–47)
HGB BLD-MCNC: 14.1 G/DL (ref 11.7–15.7)
MCH RBC QN AUTO: 26.4 PG (ref 26.5–33)
MCHC RBC AUTO-ENTMCNC: 33.7 G/DL (ref 31.5–36.5)
MCV RBC AUTO: 78.5 FL (ref 78–100)
PLATELET # BLD AUTO: 173 10E3/UL (ref 150–450)
RBC # BLD AUTO: 5.34 10E6/UL (ref 3.8–5.2)
WBC # BLD AUTO: 4.78 10E3/UL (ref 4–11)

## 2025-08-18 PROCEDURE — 3075F SYST BP GE 130 - 139MM HG: CPT | Performed by: NURSE PRACTITIONER

## 2025-08-18 PROCEDURE — 93242 EXT ECG>48HR<7D RECORDING: CPT | Performed by: NURSE PRACTITIONER

## 2025-08-18 PROCEDURE — 3079F DIAST BP 80-89 MM HG: CPT | Performed by: NURSE PRACTITIONER

## 2025-08-18 PROCEDURE — G2211 COMPLEX E/M VISIT ADD ON: HCPCS | Performed by: NURSE PRACTITIONER

## 2025-08-18 PROCEDURE — 85027 COMPLETE CBC AUTOMATED: CPT | Performed by: NURSE PRACTITIONER

## 2025-08-18 PROCEDURE — 96127 BRIEF EMOTIONAL/BEHAV ASSMT: CPT | Performed by: NURSE PRACTITIONER

## 2025-08-18 PROCEDURE — 80053 COMPREHEN METABOLIC PANEL: CPT | Performed by: NURSE PRACTITIONER

## 2025-08-18 PROCEDURE — 99396 PREV VISIT EST AGE 40-64: CPT | Performed by: NURSE PRACTITIONER

## 2025-08-18 PROCEDURE — 99214 OFFICE O/P EST MOD 30 MIN: CPT | Mod: 25 | Performed by: NURSE PRACTITIONER

## 2025-08-18 PROCEDURE — 84443 ASSAY THYROID STIM HORMONE: CPT | Performed by: NURSE PRACTITIONER

## 2025-08-18 PROCEDURE — 36415 COLL VENOUS BLD VENIPUNCTURE: CPT | Performed by: NURSE PRACTITIONER

## 2025-08-18 RX ORDER — HYDROXYZINE HYDROCHLORIDE 25 MG/1
25-50 TABLET, FILM COATED ORAL EVERY 6 HOURS PRN
Qty: 60 TABLET | Refills: 3 | Status: SHIPPED | OUTPATIENT
Start: 2025-08-18

## 2025-08-18 RX ORDER — AMLODIPINE AND BENAZEPRIL HYDROCHLORIDE 10; 40 MG/1; MG/1
1 CAPSULE ORAL DAILY
Qty: 90 CAPSULE | Refills: 3 | Status: SHIPPED | OUTPATIENT
Start: 2025-08-18

## 2025-08-18 RX ORDER — ONDANSETRON 4 MG/1
4 TABLET, ORALLY DISINTEGRATING ORAL EVERY 6 HOURS PRN
Qty: 10 TABLET | Refills: 0 | Status: CANCELLED | OUTPATIENT
Start: 2025-08-18

## 2025-08-18 RX ORDER — CYCLOBENZAPRINE HCL 10 MG
10 TABLET ORAL DAILY PRN
Qty: 60 TABLET | Refills: 3 | Status: SHIPPED | OUTPATIENT
Start: 2025-08-18

## 2025-08-18 RX ORDER — VALACYCLOVIR HYDROCHLORIDE 1 G/1
1000 TABLET, FILM COATED ORAL DAILY PRN
Qty: 60 TABLET | Refills: 0 | Status: SHIPPED | OUTPATIENT
Start: 2025-08-18

## 2025-08-18 ASSESSMENT — PATIENT HEALTH QUESTIONNAIRE - PHQ9
10. IF YOU CHECKED OFF ANY PROBLEMS, HOW DIFFICULT HAVE THESE PROBLEMS MADE IT FOR YOU TO DO YOUR WORK, TAKE CARE OF THINGS AT HOME, OR GET ALONG WITH OTHER PEOPLE: EXTREMELY DIFFICULT
SUM OF ALL RESPONSES TO PHQ QUESTIONS 1-9: 12
SUM OF ALL RESPONSES TO PHQ QUESTIONS 1-9: 12

## 2025-08-18 ASSESSMENT — ANXIETY QUESTIONNAIRES
1. FEELING NERVOUS, ANXIOUS, OR ON EDGE: NEARLY EVERY DAY
7. FEELING AFRAID AS IF SOMETHING AWFUL MIGHT HAPPEN: NOT AT ALL
4. TROUBLE RELAXING: NOT AT ALL
8. IF YOU CHECKED OFF ANY PROBLEMS, HOW DIFFICULT HAVE THESE MADE IT FOR YOU TO DO YOUR WORK, TAKE CARE OF THINGS AT HOME, OR GET ALONG WITH OTHER PEOPLE?: VERY DIFFICULT
IF YOU CHECKED OFF ANY PROBLEMS ON THIS QUESTIONNAIRE, HOW DIFFICULT HAVE THESE PROBLEMS MADE IT FOR YOU TO DO YOUR WORK, TAKE CARE OF THINGS AT HOME, OR GET ALONG WITH OTHER PEOPLE: VERY DIFFICULT
6. BECOMING EASILY ANNOYED OR IRRITABLE: NEARLY EVERY DAY
GAD7 TOTAL SCORE: 11
2. NOT BEING ABLE TO STOP OR CONTROL WORRYING: MORE THAN HALF THE DAYS
GAD7 TOTAL SCORE: 11
GAD7 TOTAL SCORE: 11
7. FEELING AFRAID AS IF SOMETHING AWFUL MIGHT HAPPEN: NOT AT ALL
3. WORRYING TOO MUCH ABOUT DIFFERENT THINGS: NEARLY EVERY DAY
5. BEING SO RESTLESS THAT IT IS HARD TO SIT STILL: NOT AT ALL

## 2025-08-19 ENCOUNTER — PATIENT OUTREACH (OUTPATIENT)
Dept: CARE COORDINATION | Facility: CLINIC | Age: 55
End: 2025-08-19
Payer: COMMERCIAL

## 2025-08-19 LAB
ALBUMIN SERPL BCG-MCNC: 4.3 G/DL (ref 3.5–5.2)
ALP SERPL-CCNC: 75 U/L (ref 40–150)
ALT SERPL W P-5'-P-CCNC: 19 U/L (ref 0–50)
ANION GAP SERPL CALCULATED.3IONS-SCNC: 10 MMOL/L (ref 7–15)
AST SERPL W P-5'-P-CCNC: 26 U/L (ref 0–45)
BILIRUB SERPL-MCNC: 0.3 MG/DL
BUN SERPL-MCNC: 8.2 MG/DL (ref 6–20)
CALCIUM SERPL-MCNC: 9.9 MG/DL (ref 8.8–10.4)
CHLORIDE SERPL-SCNC: 102 MMOL/L (ref 98–107)
CREAT SERPL-MCNC: 0.81 MG/DL (ref 0.51–0.95)
EGFRCR SERPLBLD CKD-EPI 2021: 85 ML/MIN/1.73M2
GLUCOSE SERPL-MCNC: 107 MG/DL (ref 70–99)
HCO3 SERPL-SCNC: 29 MMOL/L (ref 22–29)
POTASSIUM SERPL-SCNC: 4.5 MMOL/L (ref 3.4–5.3)
PROT SERPL-MCNC: 7.5 G/DL (ref 6.4–8.3)
SODIUM SERPL-SCNC: 141 MMOL/L (ref 135–145)
TSH SERPL DL<=0.005 MIU/L-ACNC: 2.31 UIU/ML (ref 0.3–4.2)

## 2025-08-27 LAB — CV ZIO PRELIM RESULTS: NORMAL
